# Patient Record
Sex: FEMALE | Race: WHITE | NOT HISPANIC OR LATINO
[De-identification: names, ages, dates, MRNs, and addresses within clinical notes are randomized per-mention and may not be internally consistent; named-entity substitution may affect disease eponyms.]

---

## 2022-10-04 PROBLEM — Z00.00 ENCOUNTER FOR PREVENTIVE HEALTH EXAMINATION: Status: ACTIVE | Noted: 2022-10-04

## 2022-10-06 ENCOUNTER — APPOINTMENT (OUTPATIENT)
Dept: ORTHOPEDIC SURGERY | Facility: CLINIC | Age: 67
End: 2022-10-06

## 2022-10-08 ENCOUNTER — EMERGENCY (EMERGENCY)
Facility: HOSPITAL | Age: 67
LOS: 1 days | Discharge: HOME | End: 2022-10-08
Attending: STUDENT IN AN ORGANIZED HEALTH CARE EDUCATION/TRAINING PROGRAM | Admitting: STUDENT IN AN ORGANIZED HEALTH CARE EDUCATION/TRAINING PROGRAM
Payer: COMMERCIAL

## 2022-10-08 VITALS
RESPIRATION RATE: 18 BRPM | SYSTOLIC BLOOD PRESSURE: 116 MMHG | OXYGEN SATURATION: 98 % | TEMPERATURE: 98 F | DIASTOLIC BLOOD PRESSURE: 80 MMHG | HEART RATE: 80 BPM

## 2022-10-08 VITALS
RESPIRATION RATE: 18 BRPM | OXYGEN SATURATION: 98 % | WEIGHT: 74.96 LBS | TEMPERATURE: 97 F | DIASTOLIC BLOOD PRESSURE: 81 MMHG | HEART RATE: 85 BPM | HEIGHT: 58 IN | SYSTOLIC BLOOD PRESSURE: 118 MMHG

## 2022-10-08 PROCEDURE — 72074 X-RAY EXAM THORAC SPINE4/>VW: CPT

## 2022-10-08 PROCEDURE — 99284 EMERGENCY DEPT VISIT MOD MDM: CPT | Mod: 25

## 2022-10-08 PROCEDURE — 72074 X-RAY EXAM THORAC SPINE4/>VW: CPT | Mod: 26

## 2022-10-08 PROCEDURE — 99284 EMERGENCY DEPT VISIT MOD MDM: CPT

## 2022-10-08 PROCEDURE — 72040 X-RAY EXAM NECK SPINE 2-3 VW: CPT | Mod: 26

## 2022-10-08 PROCEDURE — 99283 EMERGENCY DEPT VISIT LOW MDM: CPT

## 2022-10-08 PROCEDURE — 72110 X-RAY EXAM L-2 SPINE 4/>VWS: CPT | Mod: 26

## 2022-10-08 PROCEDURE — 72110 X-RAY EXAM L-2 SPINE 4/>VWS: CPT

## 2022-10-08 PROCEDURE — 72040 X-RAY EXAM NECK SPINE 2-3 VW: CPT

## 2022-10-08 RX ORDER — LIDOCAINE 4 G/100G
1 CREAM TOPICAL ONCE
Refills: 0 | Status: COMPLETED | OUTPATIENT
Start: 2022-10-08 | End: 2022-10-08

## 2022-10-08 RX ORDER — ACETAMINOPHEN 500 MG
650 TABLET ORAL ONCE
Refills: 0 | Status: COMPLETED | OUTPATIENT
Start: 2022-10-08 | End: 2022-10-08

## 2022-10-08 RX ADMIN — Medication 325 MILLIGRAM(S): at 12:12

## 2022-10-08 NOTE — ED PROVIDER NOTE - NSFOLLOWUPINSTRUCTIONS_ED_ALL_ED_FT
Back Pain    WHAT YOU NEED TO KNOW:    Back pain is common. You may have back pain and muscle spasms. You may feel sore or stiff on one or both sides of your back. The pain may spread to your lower body. Conditions that affect the spine, joints, or muscles can cause back pain. These may include arthritis, spinal stenosis (narrowing of the spinal column), muscle tension, or breakdown of the spinal discs.    DISCHARGE INSTRUCTIONS:    Call your local emergency number (911 in the ) if:   •You have severe back pain with chest pain.  •You cannot control your urine or bowel movements.  •Your pain becomes so severe that you cannot walk.    Return to the emergency department if:   •You have pain, numbness, or weakness in one or both legs.  •You have severe back pain, nausea, and vomiting.  •You have severe back pain that spreads to your side or genital area.    Call your doctor if:   •You have back pain that does not get better with rest and pain medicine.  •You have a fever.  •You have pain that worsens when you are on your back or when you rest.  •You have pain that worsens when you cough or sneeze.  •You lose weight without trying.  •You have questions or concerns about your condition or care.    Medicines: You may need any of the following:   •NSAIDs help decrease swelling and pain or fever. This medicine is available with or without a doctor's order. NSAIDs can cause stomach bleeding or kidney problems in certain people. If you take blood thinner medicine, always ask your healthcare provider if NSAIDs are safe for you. Always read the medicine label and follow directions.    •Acetaminophen decreases pain and fever. It is available without a doctor's order. Ask how much to take and how often to take it. Follow directions. Read the labels of all other medicines you are using to see if they also contain acetaminophen, or ask your doctor or pharmacist. Acetaminophen can cause liver damage if not taken correctly.    •Muscle relaxers help decrease muscle spasms and back pain.  •Prescription pain medicine may be given. Ask your healthcare provider how to take this medicine safely. Some prescription pain medicines contain acetaminophen. Do not take other medicines that contain acetaminophen without talking to your healthcare provider. Too much acetaminophen may cause liver damage. Prescription pain medicine may cause constipation. Ask your healthcare provider how to prevent or treat constipation.   •Take your medicine as directed. Contact your healthcare provider if you think your medicine is not helping or if you have side effects. Tell him or her if you are allergic to any medicine. Keep a list of the medicines, vitamins, and herbs you take. Include the amounts, and when and why you take them. Bring the list or the pill bottles to follow-up visits. Carry your medicine list with you in case of an emergency.    How to manage your back pain:   •Apply ice on your back for 15 to 20 minutes every hour or as directed. Use an ice pack, or put crushed ice in a plastic bag. Cover it with a towel before you apply it to your skin. Ice helps prevent tissue damage and decreases pain.  •Apply heat on your back for 20 to 30 minutes every 2 hours for as many days as directed. Heat helps decrease pain and muscle spasms.  •Stay active as much as you can without causing more pain. Bed rest could make your back pain worse. Avoid heavy lifting until your pain is gone.    •Go to physical therapy as directed. A physical therapist can teach you exercises to help improve movement and strength, and to decrease pain.    Follow up with your doctor in 2 weeks, or as directed: You might need to see a specialist. Write down your questions so you remember to ask them during your visits.    © Copyright [employer] 2022

## 2022-10-08 NOTE — ED ADULT NURSE NOTE - NSIMPLEMENTINTERV_GEN_ALL_ED
Implemented All Fall Risk Interventions:  [place] to call system. Call bell, personal items and telephone within reach. Instruct patient to call for assistance. Room bathroom lighting operational. Non-slip footwear when patient is off stretcher. Physically safe environment: no spills, clutter or unnecessary equipment. Stretcher in lowest position, wheels locked, appropriate side rails in place. Provide visual cue, wrist band, yellow gown, etc. Monitor gait and stability. Monitor for mental status changes and reorient to person, place, and time. Review medications for side effects contributing to fall risk. Reinforce activity limits and safety measures with patient and family.

## 2022-10-08 NOTE — ED PROVIDER NOTE - CLINICAL SUMMARY MEDICAL DECISION MAKING FREE TEXT BOX
Patient with severe kyphosis on exam.  She has left scapular tenderness to palpation.  Concern for possible spontaneous scapular fracture versus new compression fracture as she states she has had this before.  She does not endorse any chest pain or shortness of breath and her pain is positional and worse with movement, do not suspect underlying cardiac etiology to her symptoms at this time.  She has no evidence of neuro compromise at this time with no numbness or tingling in her legs, no bowel or bladder incontinence.  We will plan for imaging, pain control and reassess.

## 2022-10-08 NOTE — ED PROVIDER NOTE - CARE PROVIDER_API CALL
Rick Mercado)  Orthopaedic Surgery  [address] [place] [address]  Phone: [phone]  Fax: [phone]  Follow Up Time: 1-3 Days

## 2022-10-08 NOTE — ED PROVIDER NOTE - PATIENT PORTAL LINK FT
You can access the FollowMyHealth Patient Portal offered by [facility] by registering at the following website: [email or web address]. By joining [employer]’s FollowMyHealth portal, you will also be able to view your health information using other applications (apps) compatible with our system.

## 2022-10-08 NOTE — ED ADULT NURSE NOTE - OBJECTIVE STATEMENT
Pt states she has been having left scapular pain for the past two weeks. Pt states she has a history of severe osteoarthritis and thinks she might have a fracture. Pt denies trauma. Pt appears uncomfortable. Pt updated on plan of care.

## 2022-10-08 NOTE — ED PROVIDER NOTE - OBJECTIVE STATEMENT
Patient with a past medical history of irritable bowel syndrome and osteoporosis with severe kyphosis is presenting with concern for back and left scapular pain.  States her pain started about 2 weeks ago after she was lifting objects.  Pain has been constant.  States she has had compression fractures in the past from her osteoporosis and is concerned that this occurred again.  Pain is worse with movement.  Worse when she tries to lie down.  No associated chest pain or shortness of breath.  No nausea or vomiting.  Has not tried medication for her symptoms.

## 2022-10-08 NOTE — ED PROVIDER NOTE - PHYSICAL EXAMINATION
Constitutional: Awake, Alert, non-toxic. No acute distress. Well appearing, well nourished.   HEAD: Normocephalic, atraumatic.   EYES: PERRL, EOM intact, conjunctiva and sclera are clear bilaterally.  ENT: External ears normal. No rhinorrhea, no tracheal deviation   NECK: Supple, non-tender  CARDIOVASCULAR: regular rate and rhythm.  RESPIRATORY: Normal respiratory effort; breath sounds CTAB, no wheezes, rhonchi, or rales. Speaking in full sentences. No accessory muscle use.   ABDOMEN: Soft; non-tender, non-distended. No rebound or guarding.   EXTREMITIES:  no lower extremity edema, severe kyphosis on exam, no midline ttp. has left lateral scapular ttp. able to range her arms and stand up, however not able to bring her arms above her head  SKIN: Warm, dry  NEURO: A&O x3. Sensory and motor functions are grossly intact. Speech is normal. No facial droop.  PSYCH: Appearance and judgement seem appropriate for gender and age.

## 2022-10-08 NOTE — ED PROVIDER NOTE - PROGRESS NOTE DETAILS
Discussed with patient in regards to obtaining imaging study.  Patient states that she does not feel comfortable lying flat to get a CT scan.  She is asking if we could do x-rays.  I discussed with her that given her severe kyphosis that obtaining x-rays of her cervical, thoracic and lumbar spine would likely be nondiagnostic in the setting.  She is hopeful to do these x-rays and likely be discharged with orthopedic follow-up.  I offered multiple ways including giving her medication to help with facilitating CT scan but she still is worried at this time.  Given that there is no trauma, and she has no neurodeficits, I do have an overall lower suspicion for any acute fracture at this time, so I would be comfortable with obtaining x-rays and then discharging with orthopedic follow-up. Berry Gomez MD. Patient reassessed at this time.  I informed her of x-ray and her nondiagnostic but did not show any evidence of any acute abnormality.  I did discuss that if any final reads come back showing underlying fracture or abnormality, that she will be contacted regarding her results.  We will plan to have patient discharged at this time with orthopedic follow-up. she states she will follow up with dr. alicia to help facilitate outpatient upright scans.  Patient denies having any arm numbness or tingling or weakness.  I do not suspect any cervical spine injury at this time.  In regards to earlier concern for scapular fracture, she has equal strength in her upper extremities.  Lower suspicion at this time given no reported trauma. Berry Gomez MD.

## 2023-04-03 ENCOUNTER — APPOINTMENT (OUTPATIENT)
Dept: ORTHOPEDIC SURGERY | Facility: CLINIC | Age: 68
End: 2023-04-03
Payer: COMMERCIAL

## 2023-04-03 VITALS — WEIGHT: 75 LBS | BODY MASS INDEX: 15.74 KG/M2 | HEIGHT: 58 IN

## 2023-04-03 DIAGNOSIS — S32.050A WEDGE COMPRESSION FRACTURE OF FIFTH LUMBAR VERTEBRA, INITIAL ENCOUNTER FOR CLOSED FRACTURE: ICD-10-CM

## 2023-04-03 DIAGNOSIS — S32.040A WEDGE COMPRESSION FRACTURE OF FOURTH LUMBAR VERTEBRA, INITIAL ENCOUNTER FOR CLOSED FRACTURE: ICD-10-CM

## 2023-04-03 DIAGNOSIS — Z87.39 PERSONAL HISTORY OF OTHER DISEASES OF THE MUSCULOSKELETAL SYSTEM AND CONNECTIVE TISSUE: ICD-10-CM

## 2023-04-03 DIAGNOSIS — M47.22 OTHER SPONDYLOSIS WITH RADICULOPATHY, CERVICAL REGION: ICD-10-CM

## 2023-04-03 DIAGNOSIS — M54.2 CERVICALGIA: ICD-10-CM

## 2023-04-03 DIAGNOSIS — M81.0 AGE-RELATED OSTEOPOROSIS W/OUT CURRENT PATHOLOGICAL FRACTURE: ICD-10-CM

## 2023-04-03 DIAGNOSIS — S13.9XXA SPRAIN OF JOINTS AND LIGAMENTS OF UNSPECIFIED PARTS OF NECK, INITIAL ENCOUNTER: ICD-10-CM

## 2023-04-03 PROCEDURE — 99203 OFFICE O/P NEW LOW 30 MIN: CPT

## 2023-04-03 NOTE — PHYSICAL EXAM
[] : light touch intact throughout both upper extremities [No bony abnormalities] : No bony abnormalities [FreeTextEntry3] : large upper thoracic kyphosis leading into hyperlordotic neck

## 2023-04-03 NOTE — HISTORY OF PRESENT ILLNESS
[Neck] : neck [Gradual] : gradual [de-identified] : 4-3-23- one month  of neck pain after dental work. she saw her rheumatologist who sent her to [facility] for xrays \par xrays are reviewed show chronic compression fx of l4- and l5 \par \par she is known to ambulate with a cane  [] : no [FreeTextEntry3] : 3-4 weeks  [FreeTextEntry5] : patient feels that she started to get pain again after visiting the dentist and taking a lot of xrays for her teeth. She went to [facility] and had xrays done on 3/27/23 [de-identified] : dr. alicia  [de-identified] : xrays

## 2023-12-27 ENCOUNTER — EMERGENCY (EMERGENCY)
Facility: HOSPITAL | Age: 68
LOS: 1 days | Discharge: HOME | End: 2023-12-27
Attending: EMERGENCY MEDICINE | Admitting: EMERGENCY MEDICINE
Payer: COMMERCIAL

## 2023-12-27 VITALS
HEART RATE: 80 BPM | SYSTOLIC BLOOD PRESSURE: 115 MMHG | RESPIRATION RATE: 14 BRPM | DIASTOLIC BLOOD PRESSURE: 77 MMHG | OXYGEN SATURATION: 97 %

## 2023-12-27 VITALS
HEART RATE: 87 BPM | TEMPERATURE: 97 F | RESPIRATION RATE: 18 BRPM | OXYGEN SATURATION: 100 % | SYSTOLIC BLOOD PRESSURE: 105 MMHG | DIASTOLIC BLOOD PRESSURE: 71 MMHG

## 2023-12-27 DIAGNOSIS — F32.A DEPRESSION, UNSPECIFIED: ICD-10-CM

## 2023-12-27 PROCEDURE — 72082 X-RAY EXAM ENTIRE SPI 2/3 VW: CPT | Mod: 26

## 2023-12-27 PROCEDURE — 99284 EMERGENCY DEPT VISIT MOD MDM: CPT

## 2023-12-27 RX ORDER — OXYCODONE HYDROCHLORIDE 5 MG/1
10 TABLET ORAL ONCE
Refills: 0 | Status: DISCONTINUED | OUTPATIENT
Start: 2023-12-27 | End: 2023-12-27

## 2023-12-27 RX ORDER — IBUPROFEN 200 MG
600 TABLET ORAL ONCE
Refills: 0 | Status: COMPLETED | OUTPATIENT
Start: 2023-12-27 | End: 2023-12-27

## 2023-12-27 RX ORDER — ACETAMINOPHEN 500 MG
975 TABLET ORAL ONCE
Refills: 0 | Status: DISCONTINUED | OUTPATIENT
Start: 2023-12-27 | End: 2023-12-31

## 2023-12-27 RX ORDER — LIDOCAINE 4 G/100G
1 CREAM TOPICAL ONCE
Refills: 0 | Status: COMPLETED | OUTPATIENT
Start: 2023-12-27 | End: 2023-12-27

## 2023-12-27 NOTE — BH SAFETY PLAN - DISTRACTION NAME 1
[facility], [facility]

## 2023-12-27 NOTE — ED BEHAVIORAL HEALTH ASSESSMENT NOTE - REFERRAL / APPOINTMENT DETAILS
continue to follow up with established [facility] health providers; patient states she has to call back to make next appointments continue to follow up with established [facility] health providers; patient states she has to call back to make next appointments

## 2023-12-27 NOTE — ED PROVIDER NOTE - NSFOLLOWUPINSTRUCTIONS_ED_ALL_ED_FT
You were seen in the Emergency Department for pain.     1) Advance activity as tolerated.   2) Continue all previously prescribed medications as directed.    3) Follow up with your primary care physician in 24-48 hours - take copies of your results.    4) Return to the Emergency Department for worsening or persistent symptoms, and/or ANY NEW OR CONCERNING SYMPTOMS.    you can take tylenol/acetaminophen 975mg every 6 hours for pain (do not exceed 1000mg per dose or 4000mg per day). be sure that any other medications you are taking do not contain tylenol/acetaminophen. if other medications do include tylenol/acetaminophen, be sure to include this in your calculations of one time dose and daily dose.     you can also take motrin/ibuprofen 600mg for pain.     acetaminophen/tylenol can be taken at the same time as motrin/ibuprofen for maximum pain relief or 3 hours apart for continuous pain relief.    you can use lidocaine patches over the counter. these can be worn for 12 hours a day, once a day. do not place over infected or open skin.    heating pads can help with associated muscle spasms    someone from the hospital will call you for an appointment with a pain management specialist.

## 2023-12-27 NOTE — BH SAFETY PLAN - LOCAL URGENT CARE ADDRESS
[address] [place] [address], [id number] [address] [place] [address], [phone]

## 2023-12-27 NOTE — ED BEHAVIORAL HEALTH NOTE - BEHAVIORAL HEALTH NOTE
Search Terms: estela kc, 1955Search Date: 12/27/2023 22:54:11 PM  Searching on behalf of: Myself  The Drug Utilization Report below displays all of the controlled substance prescriptions, if any, that your patient has filled in the last twelve months. The information displayed on this report is compiled from pharmacy submissions to the Department, and accurately reflects the information as submitted by the pharmacies.    This report was requested by: Ashly Delaney | Reference #: [id number]    Practitioner Count: 2  Pharmacy Count: 1  Current Opioid Prescriptions: 1  Current Benzodiazepine Prescriptions: 1  Current Stimulant Prescriptions: 0      Patient Demographic Information (PDI)       PDI	First Name	Last Name	Birth Date	Gender	Street Address	[facility]	Zip Code  A	Estela Kc	1955	Female	[address]	[facility]	[address]    Prescription Information      PDI Filter:    PDI	My Rx	Current Rx	Drug Type	Rx Written	Rx Dispensed	Drug	Quantity	Days Supply	Prescriber Name	Prescriber NYA #	Payment Method	Dispenser  A	N	Y	B	12/18/2023	12/21/2023	alprazolam 0.25 mg tablet	60	30	Venkat, Kelly	[id number]	Insurance	[facility] #[id number]  A	N	Y	O	12/04/2023	12/16/2023	oxycodone-acetaminophen 5-325 mg tablet	60	30	Venkat, Kelly	[id number]	[facility] #[id number]  A	N	N	O	11/26/2023	11/26/2023	oxycodone-acetaminophen 5-325 mg tablet	7	7	Linnette Peace	[id number]	[facility] #[id number]  A	N	N	O	11/14/2023	11/18/2023	tramadol hcl 50 mg tablet	90	30	Venkat, Kelly	[id number]	[facility] #[id number]  A	N	N	B	11/14/2023	11/18/2023	alprazolam 0.25 mg tablet	60	30	Venkat, Kelly	[id number]	[facility] #[id number]    * - Details of Drug Type : O = Opioid, B = Benzodiazepine, S = Stimulant Search Terms: estela kc, 1955Search Date: 12/27/2023 22:54:11 PM  Searching on behalf of: Myself  The Drug Utilization Report below displays all of the controlled substance prescriptions, if any, that your patient has filled in the last twelve months. The information displayed on this report is compiled from pharmacy submissions to the Department, and accurately reflects the information as submitted by the pharmacies.    This report was requested by: Ashly Delaney | Reference #: [id number]    Practitioner Count: 2  Pharmacy Count: 1  Current Opioid Prescriptions: 1  Current Benzodiazepine Prescriptions: 1  Current Stimulant Prescriptions: 0      Patient Demographic Information (PDI)       PDI	First Name	Last Name	Birth Date	Gender	Street Address	[facility]	Zip Code  A	Estela Kc	1955	Female	[address]	[facility]	[id number]    Prescription Information      PDI Filter:    PDI	My Rx	Current Rx	Drug Type	Rx Written	Rx Dispensed	Drug	Quantity	Days Supply	Prescriber Name	Prescriber NYA #	Payment Method	Dispenser  A	N	Y	B	12/18/2023	12/21/2023	alprazolam 0.25 mg tablet	60	30	Venkat, Kelly	[id number]	Insurance	[facility] #[id number]  A	N	Y	O	12/04/2023	12/16/2023	oxycodone-acetaminophen 5-325 mg tablet	60	30	Venkat, Kelly	[id number]	[facility] #[id number]  A	N	N	O	11/26/2023	11/26/2023	oxycodone-acetaminophen 5-325 mg tablet	7	7	Linnette Peace	[id number]	[facility] #[id number]  A	N	N	O	11/14/2023	11/18/2023	tramadol hcl 50 mg tablet	90	30	Venkat, Kelly	[id number]	[facility] #[id number]  A	N	N	B	11/14/2023	11/18/2023	alprazolam 0.25 mg tablet	60	30	Venkat, Kelly	[id number]	[facility] #[id number]    * - Details of Drug Type : O = Opioid, B = Benzodiazepine, S = Stimulant

## 2023-12-27 NOTE — ED BEHAVIORAL HEALTH ASSESSMENT NOTE - SUMMARY
Patient is a 68 y o female,  to  x over 40 years, domiciled, non caregiver, retired professional writer, past medical history of irritable bowel syndrome and osteoporosis with hx of compression fractures, with past psychiatric history of depression and anxiety, without past inpatient psychiatric admissions, without past suicide or self injurious behavior, currently in outpatient treatment with psychiatrist Gwen Chairez and therapist of 11 years Audrey Luque (reports good rapport with her), not currently on medications for depression, receiving Xanax from a pain mgmt NP for pain, presented to the ED for worsening pain.  Psychiatry consult placed after patient having endorsed suicide attempt by taking 2 xanax, 1 percocet, 1 muscle relaxant and one glass of wine.    On evaluation, patient reports sad mood in context of ongoing chronic pain, but at this time does not present with acute depressive episode.  She does not present with sx or signs of acute janey, psychosis, impairment in functioning 2/2 psychiatric sx and is w/o SI/HI.  She denies that recent medication overdose was with intention to end her life bur rather to alleviate pain and to obtain better sleep and was not in lethal quantities.  She reports occasional passive death wishes due to ongoing pain but denies same on current evaluation and denies acute suicidal/ self harming ideation, intent or plan at time of evaluation or in the past.  Given them, together w/ no endorsement of imminent safety concerns from collateral, patient does not meet criteria for involuntary inpatient psychiatric admission at this time and does not wish for voluntary admission.  Patient and  agreeable to make environment safe by putting pulls out of reach of patient to avoid further accident medication errors / overtaking for sx control. Patient and her  advocating for increased services at home to manage pain and medication Patient is a 68 y o female,  to  x over 40 years, domiciled, non caregiver, retired professional writer, past medical history of irritable bowel syndrome and osteoporosis with hx of compression fractures, with past psychiatric history of depression and anxiety, without past inpatient psychiatric admissions, without past suicide or self injurious behavior, currently in outpatient treatment with psychiatrist Gwen Chairez and therapist of 11 years Audrey Luque (reports good rapport with her), not currently on medications for depression, receiving Xanax from a pain mgmt NP for pain, presented to the ED for worsening pain.  Psychiatry consult placed after patient having endorsed suicide attempt by taking 2 xanax, 1 percocet, 1 muscle relaxant and one glass of wine.    On evaluation, patient reports sad mood in context of ongoing chronic pain, but at this time does not present with acute depressive episode.  She does not present with sx or signs of acute janey, psychosis, impairment in functioning 2/2 psychiatric sx and is w/o SI/HI.  She denies that recent medication overdose was with intention to end her life bur rather to alleviate pain and to obtain better sleep and was not in lethal quantities.  She reports occasional passive death wishes due to ongoing pain but denies same on current evaluation and denies acute suicidal/ self harming ideation, intent or plan at time of evaluation or in the past.  Given above, together w/ no endorsement of imminent safety concerns from collateral, and risk assessment below, patient does not meet criteria for involuntary inpatient psychiatric admission at this time and does not wish for voluntary admission.  Patient and  agreeable to make environment safe by putting pills out of reach of patient to avoid further accidental medication errors / overtaking for sx control. Patient and her  advocating for increased services at home to manage pain and medication.     Recommendations:  -treat and release and continue to follow up w/ established mental  health providers; no inidication for acute psychiatric intervention at this time   -recommend social work consult to assess for eligibility for increasing home service  -pain management and palliative care consult

## 2023-12-27 NOTE — ED PROVIDER NOTE - PROGRESS NOTE DETAILS
Clothing Angélica (Cyn Watkins PGY3 pt refused medications in the ED. after education amenable to taking medications at home aside from opiates due to reactions in the past. palliative called for long term comfort care, left a message. SW evaluated and gave resources . cleared by psych

## 2023-12-27 NOTE — ED BEHAVIORAL HEALTH ASSESSMENT NOTE - NSBHATTESTBILLING_PSY_A_CORE
[id number]-Biartblqcxg diagnostic evaluation with medical services [id number]-Sgayikcknxt diagnostic evaluation with medical services

## 2023-12-27 NOTE — ED ADULT TRIAGE NOTE - CHIEF COMPLAINT QUOTE
Patient c/o severe chronic back pain, reports she attempted suicide last week and 2 nights ago. Patient reports 2 nights ago she took 2 xanax, 1 percocet, 1 muscle relaxant and one glass of wine to try to kill herself. Denies nausea, vomiting, diarrhea, chest pain, SOB. Phx osteoporosis, wheelchair-bound, depression, anxiety. Oral temp is not reading in triage. MD Hu to see in triage

## 2023-12-27 NOTE — ED ADULT NURSE NOTE - OBJECTIVE STATEMENT
Pt received to Rm 3, A&Ox4. Pt endorsing chronic severe back pain, states she cannot lay in the bed because it is too uncomfortable on her back. Pt admits to attempted suicide 2 nights ago. Pt states she wanted to numb the pain, reports taking 2 xanax, 1 percocet, 1 muscle relaxant with a glass of wine to kill herself. Pt hx of osteoporosis, wheelchair bound at baseline, depression. Pt belongings collected, placed in closet across from CT scan. PCA at bedside for 1:1 constant observation. Pt sitting in chair next to stretcher for comfort. Pt refusing pain medications at this time, states they do not work for her and that she "wants to go home". Pt temperature taken rectally, 97.1. VS noted in flowsheet. No injury or trauma noted. Pt denies HI, auditory/visual hallucinations, headache, dizziness, cheat pain, N/V/D. Resp even and unlabored. Safety maintained throughout.

## 2023-12-27 NOTE — ED PROVIDER NOTE - PHYSICAL EXAMINATION
GEN - NAD; Cachectic appearing with temporal wasting.  CARD -s1s2, RRR, no M,G,R;   PULM - CTA b/l, symmetric breath sounds;   ABD -  +BS, ND, NT, soft, no guarding, no rebound, no masses;   BACK - no CVA tenderness, diffusely tender to palpation, with thoracic kyphosis noted  EXT - symmetric pulses, 2+ dp, capillary refill < 2 seconds, no cyanosis, no edema;   NEURO - no focal neuro deficits, no slurred speech

## 2023-12-27 NOTE — ED BEHAVIORAL HEALTH ASSESSMENT NOTE - HPI (INCLUDE ILLNESS QUALITY, SEVERITY, DURATION, TIMING, CONTEXT, MODIFYING FACTORS, ASSOCIATED SIGNS AND SYMPTOMS)
Patient is a 68 y o female,  to  x over 40 years, domiciled, non caregiver, retired professional writer, past medical history of irritable bowel syndrome and osteoporosis with hx of compression fractures, with past psychiatric history of depression and anxiety, without past inpatient psychiatric admissions, without past suicide or self injurious behavior, currently in outpatient treatment with psychiatrist Gwen Chairez and therapist of 11 years Audrey Luque (reports good rapport with her), not currently on medications for depression, receiving Xanax from a pain mgmt NP for pain, presented to the ED for worsening pain.  Psychiatry consult placed after patient having endorsed suicide attempt by taking 2 xanax, 1 percocet, 1 muscle relaxant and one glass of wine.    On evaluation, patient is alert, calm and cooperative, however, visibly in pain and cold and does not wish for additional blankets to their weight.  She requests to go home to her bed to sleep multiple times throughout the interview.  She is offered pain medications by nursing staff during evaluation however she declines citing wishing to take her Xanax at home instead.     She states that 2 nights ago instead of taking usual 1 Xanax she took 2 Xanax and 1 Percocet and had a sip of wine and reports accidentally taking one Tramadol after that, as well.  Reports the intention of this was to relieve pain and go to sleep rather than to end her life.  She adamantly denies suicidal intent with excessive medication ingestion.  She reports there are times that she has thoughts of "wish that God would take me" but denies active suicidal ideation, intent, plan or attempt.  She is asked how this was misconstrued as a suicide attempt in triage note and she stated she is not sure but again reiterates that taking excess medication was not a suicide attempt.  Denies other overdose incidents.  Reports that she would never take her own life due to loving her  and not wanting to put him through turmoil; Taoist; her salvation and that when she does she wants to go to heaven and wants to be able to communicate with her  when she dies and she knows she wouldn't be able to do that if she took her own life; her cat Buck; her friends, whom she cites as support system and whom she reaches out to frequently.  Reports  as a great source of support for her.  Reports overall she has been with sad mood due to ongoing pain and poor pain control and poor sleep due to ongoing pain.  Denies guilt, worthlessness, hopelessness, concentration changes, psychomotor retardation, suicidal ideation, intent or plan (including at time of evaluation). Denies passive death wishes at time of evaluation, as well.  Reports occasional panic attacks which she describes as intense anxiety and rapid breathing. Reports praying and her rosary beads as helpful with these sx.  Denies sx of acute janey, psychosis, PTSD.  She reports that she has tried Cymbalta and Remeron for depression but neither worked and at this time she does not wish to try other medications. Does not wish to voluntary inpatient psychiatric admission and wishes to go home to her bed, blanket and cat.  She reports that if she were to develop suicidal thoughts she would call suicide hotline and her prayer hotline.      Collateral obtained from patient's .  See  note.  Writer spoke with , as well.  Reports patient is struggling with significant pain and getting medication regimen correct and knowing how to manage medication at home to alleviate pain, which he has been helping her but feels that he / they need more support.  Reports that 2 nights ago patient screamed out to him "I made a mistake" when she took a medication by accident earlier than she was meant to (reports waiting 5 hours when she was supposed to wait longer).  Reports that he has no suicidal / imminent safety concerns from suicidal standpoint w/ regards to that incident or now but feels that her pain level and medication for same needs to be addressed.  Denies that she has endorsed SI to him recently or in the past, denies past SA attempts.  Reports that out of caution that she does not make medication error again he will keep medications locked away in the basement, which she cannot access due to being unable to descend stairs.  Reports that sharps are also out of reach for patient and denies firearm access in the house.  Reports he works from home and he is at home with patient most of the time unless he steps out for groceries. Feels safe taking her home but is requesting more service in the home. Reports she is not w/ impairment in functioning outside of that related to her pain or changes in ADL function from baseline. Patient is a 68 y o female,  to  x over 40 years, domiciled, non caregiver, retired professional writer, past medical history of irritable bowel syndrome and osteoporosis with hx of compression fractures, with past psychiatric history of depression and anxiety, without past inpatient psychiatric admissions, without past suicide or self injurious behavior, currently in outpatient treatment with psychiatrist Gwen Chairez and therapist of 11 years Audrey Luqeu (reports good rapport with her), not currently on medications for depression, receiving Xanax from a pain mgmt NP for pain, presented to the ED for worsening pain.  Psychiatry consult placed after patient having endorsed suicide attempt by taking 2 xanax, 1 percocet, 1 muscle relaxant and one glass of wine.    On evaluation, patient is alert, calm and cooperative, however, visibly in pain and cold and does not wish for additional blankets to their weight.  She requests to go home to her bed to sleep multiple times throughout the interview.  She is offered pain medications by nursing staff during evaluation however she declines citing wishing to take her Xanax at home instead.     She states that 2 nights ago instead of taking usual 1 Xanax she took 2 Xanax and 1 Percocet and had a sip of wine and reports accidentally taking one Tramadol after that, as well.  Reports the intention of this was to relieve pain and go to sleep rather than to end her life.  She adamantly denies suicidal intent with excessive medication ingestion.  She reports there are times that she has thoughts of "wish that God would take me" but denies active suicidal ideation, intent, plan or attempt.  She is asked how this was misconstrued as a suicide attempt in triage note and she stated she is not sure but again reiterates that taking excess medication was not a suicide attempt.  Denies other overdose incidents.  Reports that she would never take her own life due to loving her  and not wanting to put him through turmoil; Sikh; her salvation and that when she does she wants to go to heaven and wants to be able to communicate with her  when she dies and she knows she wouldn't be able to do that if she took her own life; her cat Buck; her friends, whom she cites as support system and whom she reaches out to frequently.  Reports  as a great source of support for her.  Reports overall she has been with sad mood due to ongoing pain and poor pain control and poor sleep due to ongoing pain.  Denies guilt, worthlessness, hopelessness, concentration changes, psychomotor retardation, suicidal ideation, intent or plan (including at time of evaluation). Denies passive death wishes at time of evaluation, as well.  Reports occasional panic attacks which she describes as intense anxiety and rapid breathing. Reports praying and her rosary beads as helpful with these sx.  Denies sx of acute janey, psychosis, PTSD.  She reports that she has tried Cymbalta and Remeron for depression but neither worked and at this time she does not wish to try other medications. Does not wish to voluntary inpatient psychiatric admission and wishes to go home to her bed, blanket and cat.  She reports that if she were to develop suicidal thoughts she would call suicide hotline and her prayer hotline.      Collateral obtained from patient's .  See  note.  Writer spoke with , as well.  Reports patient is struggling with significant pain and getting medication regimen correct and knowing how to manage medication at home to alleviate pain, which he has been helping her but feels that he / they need more support.  Reports that 2 nights ago patient screamed out to him "I made a mistake" when she took a medication by accident earlier than she was meant to (reports waiting 5 hours when she was supposed to wait longer).  Reports that he has no suicidal / imminent safety concerns from suicidal standpoint w/ regards to that incident or now but feels that her pain level and medication for same needs to be addressed.  Denies that she has endorsed SI to him recently or in the past, denies past SA attempts.  Reports that out of caution that she does not make medication error again he will keep medications locked away in the basement, which she cannot access due to being unable to descend stairs.  Reports that sharps are also out of reach for patient and denies firearm access in the house.  Reports he works from home and he is at home with patient most of the time unless he steps out for groceries. Feels safe taking her home but is requesting more service in the home. Reports she is not w/ impairment in functioning outside of that related to her pain or changes in ADL function from baseline.

## 2023-12-27 NOTE — ED BEHAVIORAL HEALTH ASSESSMENT NOTE - NS ED BHA PLAN TR BH CONTACTED FT
writer left  with patient's psychiatrist Dr Gwen Chairez at [phone] writer left  with patient's psychiatrist Dr Gwen Chairez at [phone]

## 2023-12-27 NOTE — ED PROVIDER NOTE - OBJECTIVE STATEMENT
69 yo F with irritable bowel syndrome, osteoporosis with severe kyphosis a/w suicide attempt 2 days ago.  Pt reports chronic back pain unrelieved with current pain management and reports being on percocets and xanax for pain.  Pt reports taking 2 xanax, 1 percocet, 1 muscle relaxant and one glass of wine to try to kill herself.  Pt reports she only wanted to end the pain.  Pt reports she has been wheel chair bound mainly 2/2 chronic pain and is only able to ambulate a few steps.      Pt reports she was referred to hospice however was not accepted 2/2 she did not have a terminal illness.

## 2023-12-27 NOTE — BH SAFETY PLAN - SUICIDE PREVENTION LIFELINE PHONES
Suicide Prevention Lifeline Phone: [phone]- TALK ([phone]) Suicide Prevention Lifeline Phone: [phone]- TALK ([phone])

## 2023-12-27 NOTE — ED PROVIDER NOTE - PATIENT PORTAL LINK FT
You can access the FollowMyHealth Patient Portal offered by [facility] by registering at the following website: [email or web address]. By joining [employer]’s FollowMyHealth portal, you will also be able to view your health information using other applications (apps) compatible with our system. You can access the FollowMyHealth Patient Portal offered by [facility] by registering at the following website: [email or web address]. By joining [employer]’s FollowMyHealth portal, you will also be able to view your health information using other applications (apps) compatible with our system.

## 2023-12-27 NOTE — ED ADULT NURSE NOTE - NSFALLRISKINTERV_ED_ALL_ED
Assistance OOB with selected safe patient handling equipment if applicable/Assistance with ambulation/Communicate fall risk and risk factors to all staff, patient, and family/Monitor gait and stability/Provide patient with walking aids/Provide visual cue: yellow wristband, yellow gown, etc/Reinforce activity limits and safety measures with patient and family/Call bell, personal items and telephone in reach/Instruct patient to call for assistance before getting out of bed/chair/stretcher/Non-slip footwear applied when patient is off stretcher/[place] to call system/Physically safe environment - no spills, clutter or unnecessary equipment/Purposeful Proactive Rounding/Room/bathroom lighting operational, light cord in reach Assistance OOB with selected safe patient handling equipment if applicable/Assistance with ambulation/Communicate fall risk and risk factors to all staff, patient, and family/Monitor gait and stability/Provide patient with walking aids/Provide visual cue: yellow wristband, yellow gown, etc/Reinforce activity limits and safety measures with patient and family/Call bell, personal items and telephone in reach/Instruct patient to call for assistance before getting out of bed/chair/stretcher/Non-slip footwear applied when patient is off stretcher/[place] to call system/Physically safe environment - no spills, clutter or unnecessary equipment/Purposeful Proactive Rounding/Room/bathroom lighting operational, light cord in reach

## 2023-12-27 NOTE — BH SAFETY PLAN - PHONE
[phone] / [phone] / [phone] / [phone] / [phone]

## 2023-12-27 NOTE — ED BEHAVIORAL HEALTH ASSESSMENT NOTE - RISK ASSESSMENT
Patient has chronic elevated risk for self harm given age, h/o psychiatric diagnosis, and chronic pain  Patient also has dynamic risk factors due to active psychiatric sx, poor sleep patterns, unemployment, and pain  Risk factors however are currently mitigated by protective factors of race, gender, marital status, no current suicidal ideation, no history of suicide attempts, intact reality testing, no substance misuse, future orientation, ability to state reasons for living, beloved pet, Islam, judgement/ insight/impulse control intact, access to care, intact safety plan, committed to safety plan, denies access to weapons, spiritual objection to suicide, connected to care , strong support system  Given these mitigating factors, the patient does not appear to be at imminent risk for harm to self or others at this time and does not meet criteria for involuntary / emergency inpatient psychiatric hospitalization and does not wish to pursue voluntary admission at this time. Patient has chronic elevated risk for self harm given age, h/o psychiatric diagnosis, and chronic pain  Patient also has dynamic risk factors due to active psychiatric sx, poor sleep patterns, unemployment, and pain  Risk factors however are currently mitigated by protective factors of race, gender, marital status, no current suicidal ideation, no history of suicide attempts, intact reality testing, no substance misuse, future orientation, ability to state reasons for living, beloved pet, Jew, judgement/ insight/impulse control intact, access to care, intact safety plan, committed to safety plan, denies access to weapons, spiritual objection to suicide, connected to care , strong support system  Given these mitigating factors, the patient does not appear to be at imminent risk for harm to self or others at this time and does not meet criteria for involuntary / emergency inpatient psychiatric hospitalization and does not wish to pursue voluntary admission at this time.

## 2023-12-27 NOTE — ED BEHAVIORAL HEALTH ASSESSMENT NOTE - DESCRIPTION
past medical history of irritable bowel syndrome and osteoporosis with hx of compression fractures alert, cooperative, not agitated or aggressive  to  x over 40 years, domiciled, non caregiver, retired professional writer,

## 2023-12-27 NOTE — ED BEHAVIORAL HEALTH ASSESSMENT NOTE - DETAILS
patient reports occasional thoughts of wishing God would take her ; denies active SI or past SA reports hx of schizophrenia in father writer left  with patient's psychiatrist Dr Gwen Chairez at [phone] writer left  with patient's psychiatrist Dr Gwen Chairez at [phone] completed

## 2023-12-27 NOTE — ED BEHAVIORAL HEALTH NOTE - BEHAVIORAL HEALTH NOTE
Writer met with pt’s  Roberto kc ([phone]) to obtain collateral information. The following information is per the .    Pt is a 69 yo female domiciled w/ , no prior psych hx as per the , bib .    Reason for ED visit: he reports pt spoke to provider at [facility] who recommended pt to come to the ED.  says pt had reported increased pain which she is unable to manage and needs help with.    Symptoms/Hx:  says pt denied any si or hi. When writer inquired about any past suicide attempts he says no not really but did not provide details. He says the pt does not endorse any AVh, paranoia or delusions. He says pt will make passive statement saying “ why am I still here” when the pain is too much.  says pt has back pain due to spine fractures in 2007 and osteoporosis. He says pt has struggled with the pain for years and it has gotten worse. He says the pain is the driving factor to her feeling depressed. He says pt sleep is poor and suffers from insomnia. He says hygiene and appetite is okay. He says that he is the primary caretaker for the pt and assists with ADLS.     He says the nurse who visits her monthly referred them to [facility] last month for hospice or palliative care but says they were denied.    Baseline: cheerful and loving.    Medical problems: Osteoporosis, spinal fractures in 2007 and IBS.     Medication. Xanax. he did not report any other medication.    Treatment team: He says pt has been going to [facility] for the past year. He does not have contact information or know which location it is. He says pt has a nurse for pain management who comes monthly from [facility].     Family hx; none reported.    Violence/aggression: he says pt is not violent or aggressive. pt has no access to firearms or weapons.     Dispo: He is hopeful for them to be connected to a pain management nurse who can come to the house daily to manage the pain. He does not believe she is a danger to herself or others. Writer met with pt’s  Roberto kc ([phone]) to obtain collateral information. The following information is per the .    Pt is a 69 yo female domiciled w/ , no prior psych hx as per the , bib .    Reason for ED visit: he reports pt spoke to provider at [facility] who recommended pt to come to the ED.  says pt had reported increased pain which she is unable to manage and needs help with.    Symptoms/Hx:  says pt denied any si or hi. When writer inquired about any past suicide attempts he says no not really but did not provide details. He says the pt does not endorse any AVh, paranoia or delusions. He says pt will make passive statement saying “ why am I still here” when the pain is too much.  says pt has back pain due to spine fractures in 2007 and osteoporosis. He says pt has struggled with the pain for years and it has gotten worse. He says the pain is the driving factor to her feeling depressed. He says pt sleep is poor and suffers from insomnia. He says hygiene and appetite is okay. He says that he is the primary caretaker for the pt and assists with ADLS.     He says the nurse who visits her monthly referred them to [facility] last month for hospice or palliative care but says they were denied.    Baseline: cheerful and loving.    Medical problems: Osteoporosis, spinal fractures in 2007 and IBS.     Medication. Xanax. he did not report any other medication.    Treatment team: He says pt has been going to [facility] for the past year. He does not have contact information or know which location it is. He says pt has a nurse for pain management who comes monthly from [facility].     Family hx; none reported.    Violence/aggression: he says pt is not violent or aggressive. pt has no access to firearms or weapons.     Dispo: He is hopeful for them to be connected to a pain management nurse who can come to the house daily to manage the pain. He does not believe she is a danger to herself or others.

## 2023-12-27 NOTE — ED PROVIDER NOTE - CLINICAL SUMMARY MEDICAL DECISION MAKING FREE TEXT BOX
67 yo F with irritable bowel syndrome, osteoporosis with severe kyphosis a/w suicide attempt 2 days ago. Patient now does not want to hurt herself or anyone else,  at bedside wants her pain addressed.  Offered pain medications here, however patient refusing at this time.  Will obtain xray, and treat pain.  Will have psych eval given intent at the time 2 days ago, currently denying.  Will refer to pain management and possibly outpt palliative care.

## 2023-12-28 PROBLEM — K58.9 IRRITABLE BOWEL SYNDROME, UNSPECIFIED: Chronic | Status: ACTIVE | Noted: 2022-10-08

## 2023-12-28 PROBLEM — K58.9 IRRITABLE BOWEL SYNDROME WITHOUT DIARRHEA: Chronic | Status: ACTIVE | Noted: 2022-10-08

## 2023-12-28 PROBLEM — M81.0 AGE-RELATED OSTEOPOROSIS WITHOUT CURRENT PATHOLOGICAL FRACTURE: Chronic | Status: ACTIVE | Noted: 2022-10-08

## 2023-12-28 NOTE — PROVIDER CONTACT NOTE (OTHER) - ASSESSMENT
SW spoke with patient and spouse at bedside. SW provided patient with a list of home care agencies and a list for private hire agencies. SW also advised patient to speak with her primary PCP for assistance with setting up home care.

## 2024-09-29 ENCOUNTER — INPATIENT (INPATIENT)
Facility: HOSPITAL | Age: 69
LOS: 8 days | Discharge: PSYCHIATRIC OR SUBSTANCE USE FACILITY | End: 2024-10-08
Attending: HOSPITALIST | Admitting: HOSPITALIST
Payer: MEDICARE

## 2024-09-29 VITALS
HEIGHT: 58 IN | TEMPERATURE: 98 F | RESPIRATION RATE: 18 BRPM | HEART RATE: 85 BPM | SYSTOLIC BLOOD PRESSURE: 123 MMHG | DIASTOLIC BLOOD PRESSURE: 65 MMHG | OXYGEN SATURATION: 98 % | WEIGHT: 77.16 LBS

## 2024-09-29 DIAGNOSIS — T14.91XA SUICIDE ATTEMPT, INITIAL ENCOUNTER: ICD-10-CM

## 2024-09-29 DIAGNOSIS — R45.851 SUICIDAL IDEATIONS: ICD-10-CM

## 2024-09-29 DIAGNOSIS — F32.9 MAJOR DEPRESSIVE DISORDER, SINGLE EPISODE, UNSPECIFIED: ICD-10-CM

## 2024-09-29 DIAGNOSIS — K58.9 IRRITABLE BOWEL SYNDROME, UNSPECIFIED: ICD-10-CM

## 2024-09-29 DIAGNOSIS — Z29.9 ENCOUNTER FOR PROPHYLACTIC MEASURES, UNSPECIFIED: ICD-10-CM

## 2024-09-29 DIAGNOSIS — Z79.899 OTHER LONG TERM (CURRENT) DRUG THERAPY: ICD-10-CM

## 2024-09-29 LAB
ALBUMIN SERPL ELPH-MCNC: 3.7 G/DL — SIGNIFICANT CHANGE UP (ref 3.3–5)
ALP SERPL-CCNC: 58 U/L — SIGNIFICANT CHANGE UP (ref 40–120)
ALT FLD-CCNC: 10 U/L — SIGNIFICANT CHANGE UP (ref 4–33)
ANION GAP SERPL CALC-SCNC: 13 MMOL/L — SIGNIFICANT CHANGE UP (ref 7–14)
APAP SERPL-MCNC: <10 UG/ML — LOW (ref 15–25)
APPEARANCE UR: ABNORMAL
AST SERPL-CCNC: 22 U/L — SIGNIFICANT CHANGE UP (ref 4–32)
BASOPHILS # BLD AUTO: 0.02 K/UL — SIGNIFICANT CHANGE UP (ref 0–0.2)
BASOPHILS NFR BLD AUTO: 0.2 % — SIGNIFICANT CHANGE UP (ref 0–2)
BILIRUB SERPL-MCNC: <0.2 MG/DL — SIGNIFICANT CHANGE UP (ref 0.2–1.2)
BILIRUB UR-MCNC: NEGATIVE — SIGNIFICANT CHANGE UP
BUN SERPL-MCNC: 20 MG/DL — SIGNIFICANT CHANGE UP (ref 7–23)
CALCIUM SERPL-MCNC: 8.9 MG/DL — SIGNIFICANT CHANGE UP (ref 8.4–10.5)
CHLORIDE SERPL-SCNC: 93 MMOL/L — LOW (ref 98–107)
CO2 SERPL-SCNC: 27 MMOL/L — SIGNIFICANT CHANGE UP (ref 22–31)
COLOR SPEC: YELLOW — SIGNIFICANT CHANGE UP
CREAT SERPL-MCNC: 0.41 MG/DL — LOW (ref 0.5–1.3)
DIFF PNL FLD: NEGATIVE — SIGNIFICANT CHANGE UP
EGFR: 108 ML/MIN/1.73M2 — SIGNIFICANT CHANGE UP
EOSINOPHIL # BLD AUTO: 0.01 K/UL — SIGNIFICANT CHANGE UP (ref 0–0.5)
EOSINOPHIL NFR BLD AUTO: 0.1 % — SIGNIFICANT CHANGE UP (ref 0–6)
ETHANOL SERPL-MCNC: <10 MG/DL — SIGNIFICANT CHANGE UP
FLUAV AG NPH QL: SIGNIFICANT CHANGE UP
FLUBV AG NPH QL: SIGNIFICANT CHANGE UP
GLUCOSE SERPL-MCNC: 101 MG/DL — HIGH (ref 70–99)
GLUCOSE UR QL: NEGATIVE MG/DL — SIGNIFICANT CHANGE UP
HCT VFR BLD CALC: 34 % — LOW (ref 34.5–45)
HGB BLD-MCNC: 10.8 G/DL — LOW (ref 11.5–15.5)
IANC: 6.58 K/UL — SIGNIFICANT CHANGE UP (ref 1.8–7.4)
IMM GRANULOCYTES NFR BLD AUTO: 0.2 % — SIGNIFICANT CHANGE UP (ref 0–0.9)
KETONES UR-MCNC: NEGATIVE MG/DL — SIGNIFICANT CHANGE UP
LEUKOCYTE ESTERASE UR-ACNC: NEGATIVE — SIGNIFICANT CHANGE UP
LIDOCAIN IGE QN: 78 U/L — HIGH (ref 7–60)
LYMPHOCYTES # BLD AUTO: 0.96 K/UL — LOW (ref 1–3.3)
LYMPHOCYTES # BLD AUTO: 11.8 % — LOW (ref 13–44)
MAGNESIUM SERPL-MCNC: 2 MG/DL — SIGNIFICANT CHANGE UP (ref 1.6–2.6)
MCHC RBC-ENTMCNC: 25.4 PG — LOW (ref 27–34)
MCHC RBC-ENTMCNC: 31.8 GM/DL — LOW (ref 32–36)
MCV RBC AUTO: 80 FL — SIGNIFICANT CHANGE UP (ref 80–100)
MONOCYTES # BLD AUTO: 0.56 K/UL — SIGNIFICANT CHANGE UP (ref 0–0.9)
MONOCYTES NFR BLD AUTO: 6.9 % — SIGNIFICANT CHANGE UP (ref 2–14)
NEUTROPHILS # BLD AUTO: 6.58 K/UL — SIGNIFICANT CHANGE UP (ref 1.8–7.4)
NEUTROPHILS NFR BLD AUTO: 80.8 % — HIGH (ref 43–77)
NITRITE UR-MCNC: NEGATIVE — SIGNIFICANT CHANGE UP
NRBC # BLD: 0 /100 WBCS — SIGNIFICANT CHANGE UP (ref 0–0)
NRBC # FLD: 0 K/UL — SIGNIFICANT CHANGE UP (ref 0–0)
PCP SPEC-MCNC: SIGNIFICANT CHANGE UP
PH UR: 6.5 — SIGNIFICANT CHANGE UP (ref 5–8)
PHOSPHATE SERPL-MCNC: 2.8 MG/DL — SIGNIFICANT CHANGE UP (ref 2.5–4.5)
PLATELET # BLD AUTO: 442 K/UL — HIGH (ref 150–400)
POTASSIUM SERPL-MCNC: 3.6 MMOL/L — SIGNIFICANT CHANGE UP (ref 3.5–5.3)
POTASSIUM SERPL-SCNC: 3.6 MMOL/L — SIGNIFICANT CHANGE UP (ref 3.5–5.3)
PROT SERPL-MCNC: 7.8 G/DL — SIGNIFICANT CHANGE UP (ref 6–8.3)
PROT UR-MCNC: SIGNIFICANT CHANGE UP MG/DL
RBC # BLD: 4.25 M/UL — SIGNIFICANT CHANGE UP (ref 3.8–5.2)
RBC # FLD: 16.5 % — HIGH (ref 10.3–14.5)
RSV RNA NPH QL NAA+NON-PROBE: SIGNIFICANT CHANGE UP
SALICYLATES SERPL-MCNC: <0.3 MG/DL — LOW (ref 15–30)
SARS-COV-2 RNA SPEC QL NAA+PROBE: SIGNIFICANT CHANGE UP
SODIUM SERPL-SCNC: 133 MMOL/L — LOW (ref 135–145)
SP GR SPEC: 1.02 — SIGNIFICANT CHANGE UP (ref 1–1.03)
TOXICOLOGY SCREEN, DRUGS OF ABUSE, SERUM RESULT: SIGNIFICANT CHANGE UP
TSH SERPL-MCNC: 1.79 UIU/ML — SIGNIFICANT CHANGE UP (ref 0.27–4.2)
UROBILINOGEN FLD QL: 0.2 MG/DL — SIGNIFICANT CHANGE UP (ref 0.2–1)
WBC # BLD: 8.15 K/UL — SIGNIFICANT CHANGE UP (ref 3.8–10.5)
WBC # FLD AUTO: 8.15 K/UL — SIGNIFICANT CHANGE UP (ref 3.8–10.5)

## 2024-09-29 PROCEDURE — 99223 1ST HOSP IP/OBS HIGH 75: CPT

## 2024-09-29 PROCEDURE — 99285 EMERGENCY DEPT VISIT HI MDM: CPT

## 2024-09-29 RX ORDER — HYDROMORPHONE HYDROCHLORIDE 1 MG/ML
0.5 INJECTION, SOLUTION INTRAMUSCULAR; INTRAVENOUS; SUBCUTANEOUS ONCE
Refills: 0 | Status: DISCONTINUED | OUTPATIENT
Start: 2024-09-29 | End: 2024-09-29

## 2024-09-29 RX ORDER — ONDANSETRON HCL/PF 4 MG/2 ML
4 VIAL (ML) INJECTION EVERY 8 HOURS
Refills: 0 | Status: DISCONTINUED | OUTPATIENT
Start: 2024-09-29 | End: 2024-10-08

## 2024-09-29 RX ORDER — ACETAMINOPHEN 325 MG
525 TABLET ORAL ONCE
Refills: 0 | Status: COMPLETED | OUTPATIENT
Start: 2024-09-29 | End: 2024-09-29

## 2024-09-29 RX ORDER — CLONAZEPAM 1 MG
0.5 TABLET ORAL
Refills: 0 | Status: DISCONTINUED | OUTPATIENT
Start: 2024-09-29 | End: 2024-10-01

## 2024-09-29 RX ORDER — ACETAMINOPHEN 325 MG
650 TABLET ORAL EVERY 6 HOURS
Refills: 0 | Status: DISCONTINUED | OUTPATIENT
Start: 2024-09-29 | End: 2024-10-07

## 2024-09-29 RX ORDER — LIDOCAINE 50 MG/G
1 CREAM TOPICAL DAILY
Refills: 0 | Status: DISCONTINUED | OUTPATIENT
Start: 2024-09-29 | End: 2024-10-07

## 2024-09-29 RX ORDER — PANCRELIPASE 8000; 30250; 28750 [USP'U]/1; [USP'U]/1; [USP'U]/1
1 CAPSULE, DELAYED RELEASE ORAL
Refills: 0 | Status: DISCONTINUED | OUTPATIENT
Start: 2024-09-29 | End: 2024-10-08

## 2024-09-29 RX ORDER — KETOROLAC TROMETHAMINE 10 MG/1
15 TABLET, FILM COATED ORAL ONCE
Refills: 0 | Status: DISCONTINUED | OUTPATIENT
Start: 2024-09-29 | End: 2024-09-29

## 2024-09-29 RX ORDER — MAG HYDROX/ALUMINUM HYD/SIMETH 200-200-20
30 SUSPENSION, ORAL (FINAL DOSE FORM) ORAL EVERY 4 HOURS
Refills: 0 | Status: DISCONTINUED | OUTPATIENT
Start: 2024-09-29 | End: 2024-10-08

## 2024-09-29 RX ORDER — PREGABALIN 25 MG/1
25 CAPSULE ORAL AT BEDTIME
Refills: 0 | Status: DISCONTINUED | OUTPATIENT
Start: 2024-09-29 | End: 2024-09-29

## 2024-09-29 RX ORDER — 5-HYDROXYTRYPTOPHAN (5-HTP) 100 MG
3 TABLET,DISINTEGRATING ORAL AT BEDTIME
Refills: 0 | Status: DISCONTINUED | OUTPATIENT
Start: 2024-09-29 | End: 2024-10-08

## 2024-09-29 RX ORDER — PREGABALIN 25 MG/1
25 CAPSULE ORAL AT BEDTIME
Refills: 0 | Status: DISCONTINUED | OUTPATIENT
Start: 2024-09-29 | End: 2024-10-03

## 2024-09-29 RX ORDER — INFLUENZA VIRUS VACCINE 15; 15; 15; 15 UG/.5ML; UG/.5ML; UG/.5ML; UG/.5ML
0.5 SUSPENSION INTRAMUSCULAR ONCE
Refills: 0 | Status: DISCONTINUED | OUTPATIENT
Start: 2024-09-29 | End: 2024-10-08

## 2024-09-29 RX ORDER — HYDROMORPHONE HYDROCHLORIDE 1 MG/ML
0.5 INJECTION, SOLUTION INTRAMUSCULAR; INTRAVENOUS; SUBCUTANEOUS ONCE
Refills: 0 | Status: DISCONTINUED | OUTPATIENT
Start: 2024-09-29 | End: 2024-09-30

## 2024-09-29 RX ORDER — OXYCODONE HYDROCHLORIDE 30 MG/1
5 TABLET, FILM COATED, EXTENDED RELEASE ORAL ONCE
Refills: 0 | Status: DISCONTINUED | OUTPATIENT
Start: 2024-09-29 | End: 2024-09-29

## 2024-09-29 RX ORDER — OXYCODONE HYDROCHLORIDE 30 MG/1
5 TABLET, FILM COATED, EXTENDED RELEASE ORAL EVERY 6 HOURS
Refills: 0 | Status: DISCONTINUED | OUTPATIENT
Start: 2024-09-29 | End: 2024-09-29

## 2024-09-29 RX ADMIN — HYDROMORPHONE HYDROCHLORIDE 0.5 MILLIGRAM(S): 1 INJECTION, SOLUTION INTRAMUSCULAR; INTRAVENOUS; SUBCUTANEOUS at 15:20

## 2024-09-29 RX ADMIN — PANCRELIPASE 1 CAPSULE(S): 8000; 30250; 28750 CAPSULE, DELAYED RELEASE ORAL at 18:32

## 2024-09-29 RX ADMIN — Medication 210 MILLIGRAM(S): at 09:30

## 2024-09-29 RX ADMIN — Medication 0.5 MILLIGRAM(S): at 21:04

## 2024-09-29 RX ADMIN — HYDROMORPHONE HYDROCHLORIDE 0.5 MILLIGRAM(S): 1 INJECTION, SOLUTION INTRAMUSCULAR; INTRAVENOUS; SUBCUTANEOUS at 14:59

## 2024-09-29 RX ADMIN — OXYCODONE HYDROCHLORIDE 5 MILLIGRAM(S): 30 TABLET, FILM COATED, EXTENDED RELEASE ORAL at 18:33

## 2024-09-29 RX ADMIN — HYDROMORPHONE HYDROCHLORIDE 0.5 MILLIGRAM(S): 1 INJECTION, SOLUTION INTRAMUSCULAR; INTRAVENOUS; SUBCUTANEOUS at 11:35

## 2024-09-29 NOTE — ED PROVIDER NOTE - CLINICAL SUMMARY MEDICAL DECISION MAKING FREE TEXT BOX
Raghu: IBS, depression. A few days ago, took excess pain meds in suicide attempt. Check Psych labs. Psych consult. 67 y/o F w/ PMHx irritable bowel syndrome, osteoporosis with severe kyphosis, depression with hx suicide attempts presenting s/p suicide attempt 2 nights ago. Patient states that 2 nights ago she took a combination of pills including Effexor, Cymbalta, doxepin, Percocet, Klonopin with the intention of killing herself.  Patient states that she has no quality of life anymore secondary to her IBS diarrhea and severe chronic back pain. Back pain not controlled on percocet, oxy, and lyrica. Patient states that she can no longer live with this pain. When asked if she is still feels that she does not want to live she states that she wants to die even more now. She endorses diarrhea similar to her chronic IBS and chronic severe back pain. No fevers, chest pain, SOB, abd pain, dysuria, numbness/tingling, homicidal ideations, thoughts of harming others, visual or auditory hallucinations. Seen for suicide attempt with similar story in 2023.        Raghu: IBS, depression. A few days ago, took excess pain meds in suicide attempt. Check Psych labs. Psych consult. 67 y/o F w/ PMHx irritable bowel syndrome, osteoporosis with severe kyphosis, depression with hx suicide attempts presenting s/p suicide attempt (1 each of Effexor, Cymbalta, doxepin, Percocet, Klonopin) 2 nights ago. States that she does not want to live anymore due to severe uncontrolled chronic back pain and IBS. Seen for suicide attempt with similar story in 2023. Pt afebrile and hemodynamically stable. Will check screening labs and consult psych. Plan for psych admission vs medicine admission for pain management with psych consult.     Raghu: IBS, depression. A few days ago, took excess pain meds in suicide attempt. Check Psych labs. Psych consult. 69 y/o F w/ PMHx irritable bowel syndrome, osteoporosis with severe kyphosis, depression with hx suicide attempts presenting s/p suicide attempt (1 each of Effexor, Cymbalta, doxepin, Percocet, Klonopin) 2 nights ago. States that she does not want to live anymore due to severe uncontrolled chronic back pain and IBS. Seen for suicide attempt with similar story in 2023. Pt afebrile and hemodynamically stable. Will check screening labs and consult psych. Plan for psych admission vs medicine admission for intractable pain with pain management with psych consults.     Raghu: IBS, depression. A few days ago, took excess pain meds in suicide attempt. Check Psych labs. Psych consult.

## 2024-09-29 NOTE — H&P ADULT - PROBLEM SELECTOR PLAN 5
- patient was recently prescribed SSRIs but as per family member does not take them because "they do not work"  - psych eval

## 2024-09-29 NOTE — ED ADULT NURSE NOTE - NSFALLHARMRISKINTERV_ED_ALL_ED

## 2024-09-29 NOTE — ED ADULT TRIAGE NOTE - CHIEF COMPLAINT QUOTE
pt with hx IBS, depression c/o of increased depression with SI, pt took handful of pain meds 2 days ago, c/o of back pain denies any nausea or vomiting, denies dysuria

## 2024-09-29 NOTE — H&P ADULT - ASSESSMENT
69 y/o F with pmhx of chronic back pain, anxiety, depression, OA, IBS hx of suicide attempt presented to the ED for new onset suicidal ideations. Admit for psych eval and pain management of chronic back pain.

## 2024-09-29 NOTE — H&P ADULT - PROBLEM SELECTOR PLAN 2
- patient currently taking 0.5mg Klonopin BID (patient usually take it at least QD and sometimes BID as per family member)  - patient also given a 0.25 klonopin BID if patient wants to wean off benzos  - c/w klonopin prn for now

## 2024-09-29 NOTE — H&P ADULT - NSHPREVIEWOFSYSTEMS_GEN_ALL_CORE
REVIEW OF SYSTEMS:    CONSTITUTIONAL: No weakness, fevers or chills  EYES/ENT: No visual changes;  No dysphagia; No sore throat; No rhinorrhea; No sinus pain/pressure  NECK: No pain or stiffness  RESPIRATORY: No cough, wheezing, hemoptysis; No shortness of breath  CARDIOVASCULAR: No chest pain or palpitations; No lower extremity edema  GASTROINTESTINAL: No abdominal or epigastric pain. No nausea, vomiting, or hematemesis; No diarrhea or constipation. No melena or hematochezia.  GENITOURINARY: No dysuria, frequency or hematuria  NEUROLOGICAL: No numbness or focal weakness  MSK: ambulates with a walker, + chronic back pain  SKIN: No itching, burning, rashes, or lesions   All other review of systems is negative unless indicated above.

## 2024-09-29 NOTE — ED PROVIDER NOTE - ATTENDING CONTRIBUTION TO CARE
I performed a face-to-face evaluation of the patient and performed a history and physical examination. I agree with the history and physical examination. If this was a PA visit, I personally saw the patient with the PA and performed a substantive portion of the visit including all aspects of the medical decision making.    IBS, depression. A few days ago, took excess pain meds in suicide attempt. Check Psych labs. Psych consult.

## 2024-09-29 NOTE — H&P ADULT - PROBLEM SELECTOR PLAN 6
-Patient cannot provide list of medications. [facility] pharmacy emailed. Day team to follow

## 2024-09-29 NOTE — H&P ADULT - NSHPLABSRESULTS_GEN_ALL_CORE
10.8   8.15  )-----------( 442      ( 29 Sep 2024 09:18 )             34.0           133[L]  |  93[L]  |  20  ----------------------------<  101[H]  3.6   |  27  |  0.41[L]    Ca    8.9      29 Sep 2024 09:18  Phos  2.8       Mg     2.00         TPro  7.8  /  Alb  3.7  /  TBili  <0.2  /  DBili  x   /  AST  22  /  ALT  10  /  AlkPhos  58                      Urinalysis Basic - ( 29 Sep 2024 20:26 )    Color: Yellow / Appearance: Cloudy / S.023 / pH: x  Gluc: x / Ketone: Negative mg/dL  / Bili: Negative / Urobili: 0.2 mg/dL   Blood: x / Protein: Trace mg/dL / Nitrite: Negative   Leuk Esterase: Negative / RBC: 4 /HPF / WBC 4 /HPF   Sq Epi: x / Non Sq Epi: 1 /HPF / Bacteria: Negative /HPF          Urinalysis with Rflx Culture (collected 29 Sep 2024 20:26)      EKG: As per my read - NSR QTc 433 ms

## 2024-09-29 NOTE — ED PROVIDER NOTE - PROGRESS NOTE DETAILS
Patient reassessed, still in pain receiving Dilaudid at time of reassessment.  Pt repeatedly stating that she wants to go home, however no capacity at this time due to continued suicidal ideation 2/2 pain. Continuously expressed desire to go home and take pills to end the pain and her life. Explained to patient and her  that it is not safe for her to be discharged at this time without proper pain control and that discharge poses a threat to her life. Explained to patient that we will attempt to control her pain and that we will make her as comfortable as possible in the hospital.

## 2024-09-29 NOTE — ED PROVIDER NOTE - PHYSICAL EXAMINATION
Gen: NAD, cachectic   Head: NCAT  HEENT: normal conjunctiva, PERRLA  Lung: no respiratory distress, speaking in full sentences, CTA b/l     CV: regular rate and rhythm, no murmurs  Abd: soft, some lower abd distension and tenderness  MSK: severe kyphosis  Neuro: AAOx3  Psych: normal affect, linear thought process, + depression, + SI Gen: NAD, cachectic, tearful and repeatedly asking to go home  Head: NCAT  HEENT: normal conjunctiva, PERRLA  Lung: no respiratory distress, speaking in full sentences, CTA b/l     CV: regular rate and rhythm, no murmurs  Abd: soft, some lower abd distension and tenderness  MSK: severe kyphosis  Neuro: AAOx3  Psych: normal affect, linear thought process, + depression, + SI

## 2024-09-29 NOTE — H&P ADULT - PROBLEM SELECTOR PLAN 4
- follow up outpatient  - no signs of sepsis or concerns of colitis - follow up outpatient  - no signs of sepsis or concerns of colitis  - c/w Creon at a lower dose

## 2024-09-29 NOTE — H&P ADULT - NSHPPHYSICALEXAM_GEN_ALL_CORE
PHYSICAL EXAM:  VITALS: Vital Signs Last 24 Hrs  T(C): 37.1 (29 Sep 2024 21:54), Max: 37.1 (29 Sep 2024 21:54)  T(F): 98.7 (29 Sep 2024 21:54), Max: 98.7 (29 Sep 2024 21:54)  HR: 74 (29 Sep 2024 21:54) (73 - 85)  BP: 132/75 (29 Sep 2024 21:54) (110/68 - 132/76)  BP(mean): --  RR: 18 (29 Sep 2024 21:54) (18 - 19)  SpO2: 98% (29 Sep 2024 21:54) (96% - 100%)    Parameters below as of 29 Sep 2024 21:54  Patient On (Oxygen Delivery Method): room air      GENERAL: NAD, comfortable at bedside  HEAD:  Atraumatic, Normocephalic  EYES: EOMI, PERRL, conjunctiva and sclera clear  ENT: Moist Mucus Membranes present, no ulcers appreciated  NECK: Supple, No JVD  CHEST/LUNG: Clear to auscultation bilaterally; No wheezes, rales or rhonchi, no accessory muscle use  HEART: Regular rate and rhythm; No murmurs, rubs, or gallops, (+)S1, S2  ABDOMEN: Soft, Nontender, Nondistended; Normal Bowel sounds   EXTREMITIES: No clubbing, cyanosis, or edema, patient not able to turn for spine evaluation  PSYCH: anxious mood and labile affect  NEUROLOGY: AAOx3, non-focal, 5/5 strength in all extremities noted  SKIN: No rashes or lesions

## 2024-09-29 NOTE — H&P ADULT - HISTORY OF PRESENT ILLNESS
This is a 69 y/o F with pmhx of chronic back pain, anxiety, depression, OA, IBS hx of suicide attempt presented to the ED for new onset suicidal ideations. The patient states that she has chronic back pain and that she wants to end it all. Hx of overdosing on medications in the past in a suicide attempt. Today she can no longer tolerate the pain and wants to "end it all". Pain described as burning pain along the spinal area. No clear plan for SI. The patient's chronic back pain started last year. She cannot lay flat for CT scans because of kyphosis. She also has IBS with diarrhea in the AM which improves. No blood in stool. ROS otherwise negative. Denies LE weakness, denies stool or urinary incontinence. Currently denies SI at bedside.

## 2024-09-29 NOTE — H&P ADULT - PROBLEM SELECTOR PLAN 3
- patient with chronic back pain, poor follow up outpatient  - pain management consult  - patient cannot lay flat for CT scan or MRI studies  - will stat lidocaine patch  - will start lyrica 25mg QD for neuropathic pain  - PT eval

## 2024-09-29 NOTE — ED ADULT NURSE NOTE - OBJECTIVE STATEMENT
pt received spot 11. pt A+Ox3, hx of advanced osteoporosis and chronic back pain, states her pain medications are not working anymore and took extra dose of all her medications to stop the pain. states she doesn't want to live anymore because of the pain. respirations even and unlabored. pt denies abd pain/nausea/vomiting. pt placed on CO fo suicidal ideations. belonging secured. placed on monitor. labs sent. IVS in place. will monitor.

## 2024-09-29 NOTE — CHART NOTE - NSCHARTNOTEFT_GEN_A_CORE
A	Y		09/26/2024	09/26/2024	pregabalin 25 mg capsule	10	10	[facility]	[id number]  A	Y	B	09/19/2024	09/20/2024	clonazepam 0.25 mg odt	10	10	[facility]	[id number]	Ins[facility] #[id number]  A	N	B	09/19/2024	09/19/2024	clonazepam 0.5 mg tablet	15	7	[facility]	[id number]	Ins[facility] #[id number]  A	N	B	09/12/2024	09/12/2024	clonazepam 0.5 mg tablet	15	7	[facility]	[id number]	Ins[facility] #[id number]  A	N	B	09/03/2024	09/03/2024	clonazepam 0.5 mg tablet	15	7	[facility]	[id number]	Insurance	[facility] #[id number]  A	N	O	08/28/2024	08/30/2024	oxycodone hcl 5 mg/5 ml soln	120ml	20	Hawa Bhardwaj	[id number]	Insurance	[facility] #[id number]  A	N	O	08/16/2024	08/16/2024	morphine sulf 100 mg/5 ml conc	30ml	20	Hawa Bhardwaj	[id number]	Insurance	[facility] #[id number]  A	N	O	08/07/2024	08/10/2024	diphenoxylate-atropine 2.5-0.025 mg tablet	60	30	Eddi Schreiber	[id number]	Insurance	[facility] #[id number]  A	N	B	07/23/2024	07/25/2024	lorazepam 0.5 mg tablet	120	12	Hawa Bhardwaj	[id number]	Insurance	[facility] #[id number]  A	N		07/23/2024	07/23/2024	eszopiclone 1 mg tablet	14	14	Calebjose Kwame	[id number]	Insurance	[facility] #[id number]  A	N	B	05/30/2024	06/01/2024	clonazepam 0.5 mg tablet	60	30	Kelly Robledo	[id number]	Insurance	[facility] #[id number]  A	N		05/13/2024	05/14/2024	zolpidem tartrate 5 mg tablet	30	30	Kelly Robledo	[id number]	Insurance	[facility] #[id number]  A	N	B	03/14/2024	03/16/2024	clonazepam 0.5 mg tablet	60	30	Venkat, Kelly	[id number]	Insurance	[facility] #[id number]  A	N	B	02/28/2024	03/02/2024	clonazepam 0.25 mg odt	30	15	Lula Ojeda	[id number]	Insurance	[facility] #[id number]  A	N	O	02/15/2024	02/22/2024	oxycodone-acetaminophen 5-325 mg tablet	60	30	Venkat, Kelly	[id number]	Insurance	[facility] #[id number]  A	N	B	02/12/2024	02/15/2024	clonazepam 0.5 mg tablet	60	30	Venkat, Kelly	[id number]	Insurance	[facility] #[id number]  A	N	B	01/30/2024	01/30/2024	clonazepam 0.25 mg odt	30	15	Rusty Lula	[id number]	Insurance	[facility] #[id number]  A	N	B	01/04/2024	01/05/2024	clonazepam 0.5 mg tablet	60	30	Venkat, Kelly	[id number]	Insurance	[facility] #[id number]  A	N	B	12/18/2023	12/21/2023	alprazolam 0.25 mg tablet	60	30	Venkat, Kelly	[id number]	Insurance	[facility] #[id number]  A	N	O	12/04/2023	12/16/2023	oxycodone-acetaminophen 5-325 mg tablet	60	30	Venkat, Kelly	[id number]	[facility] #[id number]  A	N	O	11/26/2023	11/26/2023	oxycodone-acetaminophen 5-325 mg tablet	7	7	NataJonah bairesyl	[id number]	Insurance	[facility] #[id number]  A	N	O	11/14/2023	11/18/2023	tramadol hcl 50 mg tablet	90	30	Venkat, Kelly	[id number]	Cash	[facility] #[id number]  A	N	B	11/14/2023	11/18/2023	alprazolam 0.25 mg tablet	60	30	Venkat, Kelly	[id number]	Insurance	[facility] #[id number]  B	N		01/30/2024	02/07/2024	symmetry (1:1) 2.5mgthc and 2.5 mgcbd/0.5 ml tincture	1	6	Lula Ojeda	[id number]	[employer]	[facility]  C	N	O	07/18/2024	07/22/2024	oxycodone hcl 5 mg/5 ml soln	100	5	Amilcar Perry M	[id number]	Insurance	[facility]  C	N	B	07/18/2024	07/22/2024	lorazepam powder *	0	9	Amilcar Perry M	[id number]	Insurance	[facility]  C	N	B	07/09/2024	07/10/2024	lorazepam powder *	0	5	Amilcar Perry M	[id number]	Insurance	[facility]  C	N	B	07/08/2024	07/09/2024	lorazepam powder *	0	9	Amilcar Perry M	[id number]	Insurance	[facility]  C	N	O	06/27/2024	06/28/2024	oxycodone hcl 5 mg/5 ml soln	100	4	Amilcar Perry M	[id number]	Insurance	[facility]  C	N	O	06/25/2024	06/26/2024	oxycodone hcl 5 mg/5 ml soln	100	7	Amilcar Perry M	[id number]	Insurance	[facility]  C	N	B	06/25/2024	06/26/2024	lorazepam powder *	0	10	Amilcar Perry M	[id number]	Insurance	[facility]  C	N	O	06/21/2024	06/25/2024	oxycodone hcl 5 mg/5 ml soln	100	7	Amilcar Perry M	[id number]	Insurance	[facility]  C	N	O	06/18/2024	06/24/2024	morphine sulfate powder *	0	7	Amilcar Perry M	[id number]	Insurance	[facility]  C	N	B	06/18/2024	06/24/2024	lorazepam powder *	0	7	Amilcar Perry M	[id number]	Insurance	[facility]  C	N	O	06/13/2024	06/18/2024	morphine sulfate powder *	0	5	Amilcar Perry M	[id number]	Cash	[facility]  C	N	B	06/13/2024	06/18/2024	lorazepam powder *	0	4	Amilcar Perry M	[id number]	Cash	[facility]  C	N	O	06/11/2024	06/11/2024	morphine sulf 100 mg/5 ml conc	30ml	8	Angela Peñaloza MD)	[id number]	Insurance	[facility]

## 2024-09-29 NOTE — PATIENT PROFILE ADULT - FALL HARM RISK - HARM RISK INTERVENTIONS
Assistance with ambulation/Assistance OOB with selected safe patient handling equipment/Communicate Risk of Fall with Harm to all staff/Discuss with provider need for PT consult/Monitor for mental status changes/Monitor gait and stability/Provide patient with walking aids - walker, cane, crutches/Reinforce activity limits and safety measures with patient and family/Reorient to person, place and time as needed/Sit up slowly, dangle for a short time, stand at bedside before walking/Tailored Fall Risk Interventions/Use of alarms - bed, chair and/or voice tab/Visual Cue: Yellow wristband and red socks/Bed in lowest position, wheels locked, appropriate side rails in place/Call bell, personal items and telephone in reach/Instruct patient to call for assistance before getting out of bed or chair/Non-slip footwear when patient is out of bed/[place] to call system/Physically safe environment - no spills, clutter or unnecessary equipment/Purposeful Proactive Rounding/Room/bathroom lighting operational, light cord in reach

## 2024-09-30 LAB
FOLATE SERPL-MCNC: >20 NG/ML — HIGH (ref 3.1–17.5)
VIT B12 SERPL-MCNC: 740 PG/ML — SIGNIFICANT CHANGE UP (ref 200–900)

## 2024-09-30 PROCEDURE — 99233 SBSQ HOSP IP/OBS HIGH 50: CPT

## 2024-09-30 PROCEDURE — 90792 PSYCH DIAG EVAL W/MED SRVCS: CPT

## 2024-09-30 RX ORDER — PREGABALIN 25 MG/1
0 CAPSULE ORAL
Qty: 0 | Refills: 0 | DISCHARGE

## 2024-09-30 RX ORDER — OXYCODONE HYDROCHLORIDE 30 MG/1
1 TABLET, FILM COATED, EXTENDED RELEASE ORAL
Refills: 0 | DISCHARGE

## 2024-09-30 RX ORDER — PANCRELIPASE 8000; 30250; 28750 [USP'U]/1; [USP'U]/1; [USP'U]/1
0 CAPSULE, DELAYED RELEASE ORAL
Qty: 0 | Refills: 0 | DISCHARGE

## 2024-09-30 RX ORDER — HYDROMORPHONE HYDROCHLORIDE 1 MG/ML
0.25 INJECTION, SOLUTION INTRAMUSCULAR; INTRAVENOUS; SUBCUTANEOUS ONCE
Refills: 0 | Status: DISCONTINUED | OUTPATIENT
Start: 2024-09-30 | End: 2024-09-30

## 2024-09-30 RX ORDER — HYDROMORPHONE HYDROCHLORIDE 1 MG/ML
0.5 INJECTION, SOLUTION INTRAMUSCULAR; INTRAVENOUS; SUBCUTANEOUS EVERY 8 HOURS
Refills: 0 | Status: DISCONTINUED | OUTPATIENT
Start: 2024-09-30 | End: 2024-10-01

## 2024-09-30 RX ORDER — HYDROMORPHONE HYDROCHLORIDE 1 MG/ML
0.2 INJECTION, SOLUTION INTRAMUSCULAR; INTRAVENOUS; SUBCUTANEOUS ONCE
Refills: 0 | Status: DISCONTINUED | OUTPATIENT
Start: 2024-09-30 | End: 2024-09-30

## 2024-09-30 RX ORDER — HYDROMORPHONE HYDROCHLORIDE 1 MG/ML
0.2 INJECTION, SOLUTION INTRAMUSCULAR; INTRAVENOUS; SUBCUTANEOUS EVERY 8 HOURS
Refills: 0 | Status: DISCONTINUED | OUTPATIENT
Start: 2024-09-30 | End: 2024-10-03

## 2024-09-30 RX ORDER — ACETAMINOPHEN 325 MG
525 TABLET ORAL ONCE
Refills: 0 | Status: COMPLETED | OUTPATIENT
Start: 2024-09-30 | End: 2024-09-30

## 2024-09-30 RX ORDER — CLONAZEPAM 1 MG
1 TABLET ORAL
Refills: 0 | DISCHARGE

## 2024-09-30 RX ORDER — PANCRELIPASE 8000; 30250; 28750 [USP'U]/1; [USP'U]/1; [USP'U]/1
1 CAPSULE, DELAYED RELEASE ORAL ONCE
Refills: 0 | Status: COMPLETED | OUTPATIENT
Start: 2024-09-30 | End: 2024-09-30

## 2024-09-30 RX ORDER — SENNOSIDES 8.6 MG
2 TABLET ORAL AT BEDTIME
Refills: 0 | Status: DISCONTINUED | OUTPATIENT
Start: 2024-09-30 | End: 2024-10-04

## 2024-09-30 RX ADMIN — PANCRELIPASE 1 CAPSULE(S): 8000; 30250; 28750 CAPSULE, DELAYED RELEASE ORAL at 01:16

## 2024-09-30 RX ADMIN — Medication 525 MILLIGRAM(S): at 12:03

## 2024-09-30 RX ADMIN — PANCRELIPASE 1 CAPSULE(S): 8000; 30250; 28750 CAPSULE, DELAYED RELEASE ORAL at 09:07

## 2024-09-30 RX ADMIN — Medication 0.5 MILLIGRAM(S): at 23:41

## 2024-09-30 RX ADMIN — HYDROMORPHONE HYDROCHLORIDE 0.5 MILLIGRAM(S): 1 INJECTION, SOLUTION INTRAMUSCULAR; INTRAVENOUS; SUBCUTANEOUS at 02:50

## 2024-09-30 RX ADMIN — HYDROMORPHONE HYDROCHLORIDE 0.2 MILLIGRAM(S): 1 INJECTION, SOLUTION INTRAMUSCULAR; INTRAVENOUS; SUBCUTANEOUS at 20:53

## 2024-09-30 RX ADMIN — HYDROMORPHONE HYDROCHLORIDE 0.5 MILLIGRAM(S): 1 INJECTION, SOLUTION INTRAMUSCULAR; INTRAVENOUS; SUBCUTANEOUS at 01:53

## 2024-09-30 RX ADMIN — PANCRELIPASE 1 CAPSULE(S): 8000; 30250; 28750 CAPSULE, DELAYED RELEASE ORAL at 18:36

## 2024-09-30 RX ADMIN — HYDROMORPHONE HYDROCHLORIDE 0.2 MILLIGRAM(S): 1 INJECTION, SOLUTION INTRAMUSCULAR; INTRAVENOUS; SUBCUTANEOUS at 22:14

## 2024-09-30 RX ADMIN — PANCRELIPASE 1 CAPSULE(S): 8000; 30250; 28750 CAPSULE, DELAYED RELEASE ORAL at 14:36

## 2024-09-30 RX ADMIN — Medication 210 MILLIGRAM(S): at 11:03

## 2024-09-30 NOTE — BH CONSULTATION LIAISON ASSESSMENT NOTE - NSBHCHARTREVIEWVS_PSY_A_CORE FT
Vital Signs Last 24 Hrs  T(C): 36.5 (30 Sep 2024 13:00), Max: 37.1 (29 Sep 2024 21:54)  T(F): 97.7 (30 Sep 2024 13:00), Max: 98.7 (29 Sep 2024 21:54)  HR: 72 (30 Sep 2024 13:00) (67 - 75)  BP: 110/73 (30 Sep 2024 13:00) (104/67 - 132/76)  BP(mean): --  RR: 18 (30 Sep 2024 13:00) (18 - 18)  SpO2: 96% (30 Sep 2024 13:00) (96% - 99%)    Parameters below as of 30 Sep 2024 13:00  Patient On (Oxygen Delivery Method): room air

## 2024-09-30 NOTE — PROGRESS NOTE ADULT - SUBJECTIVE AND OBJECTIVE BOX
Patient is a 68y old  Female who presents with a chief complaint of chronic back pain, SI (29 Sep 2024 23:25)      SUBJECTIVE / OVERNIGHT EVENTS: Pt seen and examined at 11:20am, no overnight events, pt reports chronic back pain due to compression fxs, states that she is not able to get ct or mri as she is not able to lie flat, agreeable to go for xray, has no pain management doctor, requesting for pain management, has IBS and has diarrhea,  Miles at bedside, no other new issues reported.    MEDICATIONS  (STANDING):  heparin   Injectable 5000 Unit(s) SubCutaneous every 12 hours  influenza  Vaccine (HIGH DOSE) 0.5 milliLiter(s) IntraMuscular once  lidocaine   4% Patch 1 Patch Transdermal daily  pancrelipase  (CREON  6,000 Lipase Units) 1 Capsule(s) Oral three times a day with meals  pregabalin 25 milliGRAM(s) Oral at bedtime    MEDICATIONS  (PRN):  acetaminophen     Tablet .. 650 milliGRAM(s) Oral every 6 hours PRN Temp greater or equal to 38C (100.4F), Mild Pain (1 - 3)  aluminum hydroxide/magnesium hydroxide/simethicone Suspension 30 milliLiter(s) Oral every 4 hours PRN Dyspepsia  clonazePAM  Tablet 0.5 milliGRAM(s) Oral two times a day PRN anxiety  melatonin 3 milliGRAM(s) Oral at bedtime PRN Insomnia  ondansetron Injectable 4 milliGRAM(s) IV Push every 8 hours PRN Nausea and/or Vomiting      Vital Signs Last 24 Hrs  T(C): 36.6 (30 Sep 2024 05:00), Max: 37.1 (29 Sep 2024 21:54)  T(F): 97.9 (30 Sep 2024 05:00), Max: 98.7 (29 Sep 2024 21:54)  HR: 68 (30 Sep 2024 05:00) (67 - 74)  BP: 108/68 (30 Sep 2024 05:00) (104/67 - 132/76)  BP(mean): --  RR: 18 (30 Sep 2024 05:00) (18 - 18)  SpO2: 96% (30 Sep 2024 05:00) (96% - 99%)    Parameters below as of 30 Sep 2024 05:00  Patient On (Oxygen Delivery Method): room air      CAPILLARY BLOOD GLUCOSE        I&O's Summary      PHYSICAL EXAM:  GENERAL: NAD, frail appearing female  CHEST/LUNG: Clear to auscultation bilaterally; No wheeze  HEART: Regular rate and rhythm  ABDOMEN: Soft, Nontender, mildly distended  EXTREMITIES: no LE edema  PSYCH: Calm  NEUROLOGY: AA answers questions appropriately  SKIN: dry and warm    LABS:                        10.8   8.15  )-----------( 442      ( 29 Sep 2024 09:18 )             34.0         133[L]  |  93[L]  |  20  ----------------------------<  101[H]  3.6   |  27  |  0.41[L]    Ca    8.9      29 Sep 2024 09:18  Phos  2.8       Mg     2.00         TPro  7.8  /  Alb  3.7  /  TBili  <0.2  /  DBili  x   /  AST  22  /  ALT  10  /  AlkPhos  58            Urinalysis Basic - ( 29 Sep 2024 20:26 )    Color: Yellow / Appearance: Cloudy / S.023 / pH: x  Gluc: x / Ketone: Negative mg/dL  / Bili: Negative / Urobili: 0.2 mg/dL   Blood: x / Protein: Trace mg/dL / Nitrite: Negative   Leuk Esterase: Negative / RBC: 4 /HPF / WBC 4 /HPF   Sq Epi: x / Non Sq Epi: 1 /HPF / Bacteria: Negative /HPF        RADIOLOGY & ADDITIONAL TESTS:    Imaging Personally Reviewed:    Consultant(s) Notes Reviewed:      Care Discussed with Consultants/Other Providers:

## 2024-09-30 NOTE — PHYSICAL THERAPY INITIAL EVALUATION ADULT - ADDITIONAL COMMENTS
Pt lives in a private house with her , resides on the main level, was independent with all functional mobility using a rolling walker, requires some assistance from  in order to complete ADLs at home.     Pt left semi-supine in bed, all lines intact, all needs in reach, bed alarm set, in NAD. [employer] aware. Heart rate 74 beats per minute.

## 2024-09-30 NOTE — BH CONSULTATION LIAISON ASSESSMENT NOTE - NSBHCHARTREVIEWLAB_PSY_A_CORE FT
INTERVAL LAB RESULTS:                        10.8   8.15  )-----------( 442      ( 29 Sep 2024 09:18 )             34.0         Urinalysis Basic - ( 29 Sep 2024 20:26 )    Color: Yellow / Appearance: Cloudy / S.023 / pH: x  Gluc: x / Ketone: Negative mg/dL  / Bili: Negative / Urobili: 0.2 mg/dL   Blood: x / Protein: Trace mg/dL / Nitrite: Negative   Leuk Esterase: Negative / RBC: 4 /HPF / WBC 4 /HPF   Sq Epi: x / Non Sq Epi: 1 /HPF / Bacteria: Negative /HPF        INTERVAL IMAGING STUDIES:

## 2024-09-30 NOTE — BH CONSULTATION LIAISON ASSESSMENT NOTE - HPI (INCLUDE ILLNESS QUALITY, SEVERITY, DURATION, TIMING, CONTEXT, MODIFYING FACTORS, ASSOCIATED SIGNS AND SYMPTOMS)
Patient is a 69 yo female with PPHx of depression and anxiety with no prior inpatient psychiatric hospitalizations, currently in outpatient treatment with psychiatrist Dr. Puente, and PMH significant for chronic back pain,  irritable bowel syndrome and osteoporosis with hx of compression fractures who presented s/p suicide attempt via overdose on her medication. Psychiatry consulted for concerns of suicidal ideation.    Patient seen today with  at bedside with patient's consent. She is cooperative on approach and agreeable to an evaluation. Explains that she has been living in debilitating pain secondary to severe osteoporosis. In the past year, has become bedbound, unable to lay on her back or engage in meaningful physical activity, and no longer able to attend Religious weekly. Also endorses poor sleep and overall low mood due to the pain. Patient states that her osteoporosis was so significant that earlier this year she had home hospice care to manage pain. She was discharged from home hospice as she was not taking the pain medication that was prescribed (patient reported experiencing abdominal discomfort due to the morphine at the time). States she is very sensitive to medications and also suffers from IBS with predominant diarrhea symptoms which greatly limits her appetite and choice of food. In June, also fractured her right hip which needed 3 pins. Patient states that she started feeling hopeless due to several failed medication trials that did not help with her pain. States that the only medication that has helped so far is dilaudid but that made her feel "spaced out and loopy." Pt reports prior trials of remeron (causes restlessness), lunesta/ambien (ineffective for insomnia), ativan, amitriptyline (made her feel like a zombie), effexor, cymbalta, percocet, doxepin, hydroxyzine, and klonopin. The last medication she tried was lyrica which did not help and this triggered her to attempt suicide. Reports she took several of her medications (effexor, cymbalta, doxepin, percocet, klonopin) with the intent of ending her life so that the pain will stop. Is tearful when explaining this, stating that she finds meaning in her life with her doug, loves her , and enjoys her TV shows and using motivational apps. She clarifies that her SA was solely so that she could stop the ongoing pain even though she dose not necessarily want to end her life. Admits to feeling surprised that she woke up after her SA, and decided to come to the hospital for help. Patient denies any current SI/HI and AVH. No delusions verbalized. Expresses motivation in getting better and working with pain management to control her pain.     Patient denies prior psychiatric hospitalizations. Admits to prior suicide attempts via overdose for similar reasons. Currently in outpatient care with psychiatrist Dr. Puente. Denies any alcohol/substance use.

## 2024-09-30 NOTE — PHYSICAL THERAPY INITIAL EVALUATION ADULT - PERTINENT HX OF CURRENT PROBLEM, REHAB EVAL
68 year old female with past medical history stated below, presenting status post suicide attempt 2 nights ago. Patient states that 2 nights ago she took a combination of pills including 1 each of Effexor, Cymbalta, doxepin, Percocet, Klonopin with the intention of killing herself.  Patient states that she has no quality of life anymore secondary to her IBS diarrhea and severe chronic back pain.

## 2024-09-30 NOTE — PROVIDER CONTACT NOTE (OTHER) - BACKGROUND
69 y/o F w/ PMHx irritable bowel syndrome, osteoporosis with severe kyphosis, depression with hx suicide attempts presenting s/p suicide attempt 2 nights ago.

## 2024-09-30 NOTE — BH CONSULTATION LIAISON ASSESSMENT NOTE - NS ED BHA REVIEW OF ED CHART AVAILABLE LABS REVIEWED
pt c.o mechanical fall approx two weeks ago. c.o R hip, R lower back and R leg pain. pt able to ambulate independently without difficulty.
Yes

## 2024-09-30 NOTE — PHARMACOTHERAPY INTERVENTION NOTE - COMMENTS
Medication history is complete. Medication list updated in Outpatient Medication Record (OMR)per [facility] and patient. Please call spectra [phone] if you have any questions. Medication history is complete. Medication list updated in Outpatient Medication Record (OMR)per [facility] and patient. Patient states she is not taking the following medications recently dispensed from pharmacy: pregabalin, clonazepam 0.25 mg (patient is currently taking clonazepam 0.5mg), hydroxyzine, duloxetine, lactulose, venlafaxine, morphine. Please call spectra [phone] if you have any questions.

## 2024-09-30 NOTE — BH CONSULTATION LIAISON ASSESSMENT NOTE - SUMMARY
Patient is a 67 yo female with PPHx of depression and anxiety with no prior inpatient psychiatric hospitalizations, currently in outpatient treatment with psychiatrist Dr. Puente, and PMH significant for chronic back pain,  irritable bowel syndrome and osteoporosis with hx of compression fractures who presented s/p suicide attempt via overdose on her medication. Psychiatry consulted for concerns of suicidal ideation.    On evaluation, patient cooperative albeit somewhat anxious and tearful when explaining her suffering and debilitating pain. Clarifies that her suicide attempt was solely in context of wanting her pain to stop. Admits to some hopelessness due to several failed pain medication trials, stating that lack of improvement on lyrica was the final trigger for her suicide attempt. At this time, denying any further SI and expressing motivation to work with pain management team. Discussed likely indication for inpatient psychiatric hospitalization in context of safety concerns/recent suicide attempt, although at this time patient remains bedbound with severe pain symptoms which will need to be addressed first.     Recommendations  - Continue 1:1 for suicidal ideation  - C/w klonopin 0.5 mg BID PRN For anxiety  - Recommend pain management  - may benefit from holistic nursing consult  - will benefit from  consult.   - Will plan to obtain further collateral from outpatient psychiatrist Dr. Puente ([phone]). Patient requesting that she speak to Dr. Puente first today to explain recent suicide attempt to him.   - Disposition: Due to significant safety concerns, suicidal ideation, recent suicide attempt, pt meets criteria for involuntary inpatient psychiatric hospitalization. At this time, however, patient remains bedbound and in severe pain which may not be able to be appropriately managed in psychiatric unit. Recommending ongoing pain control, medical workup/clearance and [facility] psychiatry will continue to assess.

## 2024-09-30 NOTE — PROVIDER CONTACT NOTE (OTHER) - RECOMMENDATIONS
ACP Bonnie was notified and recommended the patient uses compression sequential devices instead. Patient adamantly refused that intervention as well after further reeducation and understanding.

## 2024-09-30 NOTE — PROGRESS NOTE ADULT - PROBLEM SELECTOR PLAN 3
- patient with chronic back pain, poor follow up outpatient  - pain management consult  - patient cannot lay flat for CT scan or MRI studies, agreeable for xrays, will check c/t/l spine xrays  - cont lidocaine patch  - will start lyrica 25mg QD for neuropathic pain  - PT eval- no skilled PT needs

## 2024-09-30 NOTE — BH CONSULTATION LIAISON ASSESSMENT NOTE - RISK ASSESSMENT
Patient has chronic elevated risk for self harm given age, h/o psychiatric diagnosis, and chronic pain. Patient also has active psychiatric sx, poor sleep patterns, and presented with recent suicide attempt.   Protective factors include positive therapeutic relationship with psychiatrist, family support from , Hinduism beliefs, and pet cat.

## 2024-09-30 NOTE — BH CONSULTATION LIAISON ASSESSMENT NOTE - NSBHATTESTCOMMENTATTENDFT_PSY_A_CORE
67 yo female with PPHx of depression and anxiety, p/w severe osteoporosis and worsening chronic pain, p/w suicide attempt. Patient admits frankly to SA due to severe untractable pain. Admits to depression in this context, along with poor sleep. Does not want inpt psych, though told that SA typically would align with inpt psych admission. Barriers to this would include continued pain which is unlikely to be mitigated by inpt psych, severe deconditioning, bedbound status which likely requires hospital bed. Would ensure adequate pain control whilst mgmt of sfx which patient has been fastidious/vigilant of. Still needs inpt psych. Psych will follow closely for dispo. Meds as above.

## 2024-09-30 NOTE — BH CONSULTATION LIAISON ASSESSMENT NOTE - CURRENT MEDICATION
MEDICATIONS  (STANDING):  heparin   Injectable 5000 Unit(s) SubCutaneous every 12 hours  influenza  Vaccine (HIGH DOSE) 0.5 milliLiter(s) IntraMuscular once  lidocaine   4% Patch 1 Patch Transdermal daily  pancrelipase  (CREON  6,000 Lipase Units) 1 Capsule(s) Oral three times a day with meals  pregabalin 25 milliGRAM(s) Oral at bedtime  senna 2 Tablet(s) Oral at bedtime    MEDICATIONS  (PRN):  acetaminophen     Tablet .. 650 milliGRAM(s) Oral every 6 hours PRN Temp greater or equal to 38C (100.4F), Mild Pain (1 - 3)  aluminum hydroxide/magnesium hydroxide/simethicone Suspension 30 milliLiter(s) Oral every 4 hours PRN Dyspepsia  clonazePAM  Tablet 0.5 milliGRAM(s) Oral two times a day PRN anxiety  HYDROmorphone   Tablet 0.5 milliGRAM(s) Oral every 8 hours PRN moderate to severe pain (4-10)  HYDROmorphone  Injectable 0.2 milliGRAM(s) IV Push every 8 hours PRN severe breakthrough pain/prior to imaging  melatonin 3 milliGRAM(s) Oral at bedtime PRN Insomnia  ondansetron Injectable 4 milliGRAM(s) IV Push every 8 hours PRN Nausea and/or Vomiting  polyethylene glycol 3350 17 Gram(s) Oral daily PRN Constipation

## 2024-09-30 NOTE — BH CONSULTATION LIAISON ASSESSMENT NOTE - NSSUICPROTFACT_PSY_ALL_CORE
Identifies reasons for living/Supportive social network of family or friends/Positive therapeutic relationships/Rastafari beliefs/Beloved pets

## 2024-09-30 NOTE — PHYSICAL THERAPY INITIAL EVALUATION ADULT - GENERAL OBSERVATIONS, REHAB EVAL
Pt received semi-supine in bed, +telemetry, all lines intact, in NAD. Pt's  at bedside. Agreeable to PT evaluation.

## 2024-09-30 NOTE — CONSULT NOTE ADULT - SUBJECTIVE AND OBJECTIVE BOX
Chief Complaint: back pain    HPI:  This is a 69 y/o F with pmhx of chronic back pain, anxiety, depression, OA, IBS hx of suicide attempt presented to the ED for new onset suicidal ideations. The patient states that she has chronic back pain and that she wants to end it all. Hx of overdosing on medications in the past in a suicide attempt. Today she can no longer tolerate the pain and wants to "end it all". Pain described as burning pain along the spinal area. No clear plan for SI. The patient's chronic back pain started last year. She cannot lay flat for CT scans because of kyphosis. She also has IBS with diarrhea in the AM which improves. No blood in stool. ROS otherwise negative. Denies LE weakness, denies stool or urinary incontinence. Currently denies SI at bedside. (29 Sep 2024 23:25)      PAST MEDICAL & SURGICAL HISTORY:  Osteoporosis  IBS (irritable bowel syndrome)    No significant past surgical history      FAMILY HISTORY:  No pertinent family history in first degree relatives      SOCIAL HISTORY:  [ ] Denies Smoking, Alcohol, or Drug Use    Allergies  Actinel (Muscle Pain)  Lexapro (Other)    Intolerances      PAIN MEDICATIONS:  acetaminophen     Tablet .. 650 milliGRAM(s) Oral every 6 hours PRN  clonazePAM  Tablet 0.5 milliGRAM(s) Oral two times a day PRN  melatonin 3 milliGRAM(s) Oral at bedtime PRN  ondansetron Injectable 4 milliGRAM(s) IV Push every 8 hours PRN  pregabalin 25 milliGRAM(s) Oral at bedtime    Heme:  heparin   Injectable 5000 Unit(s) SubCutaneous every 12 hours    GI:  aluminum hydroxide/magnesium hydroxide/simethicone Suspension 30 milliLiter(s) Oral every 4 hours PRN  pancrelipase  (CREON  6,000 Lipase Units) 1 Capsule(s) Oral three times a day with meals    All Other Medications:  influenza  Vaccine (HIGH DOSE) 0.5 milliLiter(s) IntraMuscular once  lidocaine   4% Patch 1 Patch Transdermal daily      Vital Signs Last 24 Hrs  T(C): 36.6 (30 Sep 2024 05:00), Max: 37.1 (29 Sep 2024 21:54)  T(F): 97.9 (30 Sep 2024 05:00), Max: 98.7 (29 Sep 2024 21:54)  HR: 68 (30 Sep 2024 05:00) (67 - 74)  BP: 108/68 (30 Sep 2024 05:00) (104/67 - 132/76)  BP(mean): --  RR: 18 (30 Sep 2024 05:00) (18 - 18)  SpO2: 96% (30 Sep 2024 05:00) (96% - 99%)    Parameters below as of 30 Sep 2024 05:00  Patient On (Oxygen Delivery Method): room air        PAIN SCORE:         SCALE USED: (1-10 VNRS)           LABS:                          10.8   8.15  )-----------( 442      ( 29 Sep 2024 09:18 )             34.0         133[L]  |  93[L]  |  20  ----------------------------<  101[H]  3.6   |  27  |  0.41[L]    Ca    8.9      29 Sep 2024 09:18  Phos  2.8       Mg     2.00         TPro  7.8  /  Alb  3.7  /  TBili  <0.2  /  DBili  x   /  AST  22  /  ALT  10  /  AlkPhos  58        Urinalysis Basic - ( 29 Sep 2024 20:26 )    Color: Yellow / Appearance: Cloudy / S.023 / pH: x  Gluc: x / Ketone: Negative mg/dL  / Bili: Negative / Urobili: 0.2 mg/dL   Blood: x / Protein: Trace mg/dL / Nitrite: Negative   Leuk Esterase: Negative / RBC: 4 /HPF / WBC 4 /HPF   Sq Epi: x / Non Sq Epi: 1 /HPF / Bacteria: Negative /HPF        RADIOLOGY:    Drug Screen:  Drug Screen W/PCP, Urine: Done ( @ 20:26)        [x ]  [place]  Reviewed. Reference #[id number]    Summary:  Patient with a history of back pain since  s/p compression fractures, osteoporosis, OA. She had been taking Reclast infusions to treat osteoporosis but stopped last year due to side effects that caused arthralgias. The treatment was effective in reducing back pain because she is now feeling more pain and weakness. No other substitute medications have been tried. She has been unable to go to [facility] or doctor's appointments 2/2 pain and remains at home lying on her side in bed. Patient is ambulatory with assistive device. The pain is in her entire back to the buttocks and she describes it as a constant burning pain that keeps her up at night. It has been worse since early this month and positioning no longer helps to relieve pain. She had seen a pain management doctor but the ride to the doctor's office was too painful and only acupuncture was offered as a solution. She sees a psychiatrist who had tried Cymbalta (causes dizziniess) at 25mg, Lyrica (not effective), Gabapentin (not effective), Amitriptyline (felt like a "zombie").   Patient had also been taking Oxycodone 1mg Q10 hours PRN which had helped. She did have a fall recently which required hip surgery and was admitted to hospice care from 2024 and discharged 2024. However, the Oxycodone stopped being effective and they had the patient taking larger Oxy dose which resulted in GI upset and she stopped using. Morphine was also tried and that caused even more GI upset so patient refused to take the opioids and was discharged from hospice because she was "noncompliant". Currently, the only medication that has helped was IV Dilaudid 0.5mg but she felt like she was "floating in space". Patient is hesitant to try any medications due to concerns with side effects but would like to find alternative osteoporosis treatment.     PHYSICAL EXAM:  GENERAL: Alert & Oriented x 3 in NAD. Sitting in bed.     Recommendations:   -  Consider ordering PO Dilaudid 0.5mg Q8H PRN moderate - severe pain. Hold for sedation. Not to be given within 1 hour of any immediate acting opioid. IF patient tolerates well and still having pain, may consider increasing dose to 1mg.  -  Consider ordering IV Dilaudid 0.2mg Q8H severe breakthrough pain/prior to imaging. Hold for sedation. Not to be given within 1 hour of any immediate acting opioid.  -  Recommend maintaining continuous pulse oximetry.  -  Recommend Rheumatology or Endocrinology consult for osteoporosis   -  Recommend follow up with Chronic Pain doctor when discharged. If patient does not have a Chronic Pain doctor, may acquire one through patient's personal insurance carrier.    Will notify Chronic Pain attending on call, Dr. Davis. Awaiting call back.  No further recommendations at this time, Chronic pain service to sign off. May call Chronic Pain Service if needed.   Thank you.     Chief Complaint: back pain    HPI:  This is a 67 y/o F with pmhx of chronic back pain, anxiety, depression, OA, IBS hx of suicide attempt presented to the ED for new onset suicidal ideations. The patient states that she has chronic back pain and that she wants to end it all. Hx of overdosing on medications in the past in a suicide attempt. Today she can no longer tolerate the pain and wants to "end it all". Pain described as burning pain along the spinal area. No clear plan for SI. The patient's chronic back pain started last year. She cannot lay flat for CT scans because of kyphosis. She also has IBS with diarrhea in the AM which improves. No blood in stool. ROS otherwise negative. Denies LE weakness, denies stool or urinary incontinence. Currently denies SI at bedside. (29 Sep 2024 23:25)      PAST MEDICAL & SURGICAL HISTORY:  Osteoporosis  IBS (irritable bowel syndrome)    No significant past surgical history      FAMILY HISTORY:  No pertinent family history in first degree relatives      SOCIAL HISTORY:  [ ] Denies Smoking, Alcohol, or Drug Use    Allergies  Actinel (Muscle Pain)  Lexapro (Other)    Intolerances      PAIN MEDICATIONS:  acetaminophen     Tablet .. 650 milliGRAM(s) Oral every 6 hours PRN  clonazePAM  Tablet 0.5 milliGRAM(s) Oral two times a day PRN  melatonin 3 milliGRAM(s) Oral at bedtime PRN  ondansetron Injectable 4 milliGRAM(s) IV Push every 8 hours PRN  pregabalin 25 milliGRAM(s) Oral at bedtime    Heme:  heparin   Injectable 5000 Unit(s) SubCutaneous every 12 hours    GI:  aluminum hydroxide/magnesium hydroxide/simethicone Suspension 30 milliLiter(s) Oral every 4 hours PRN  pancrelipase  (CREON  6,000 Lipase Units) 1 Capsule(s) Oral three times a day with meals    All Other Medications:  influenza  Vaccine (HIGH DOSE) 0.5 milliLiter(s) IntraMuscular once  lidocaine   4% Patch 1 Patch Transdermal daily      Vital Signs Last 24 Hrs  T(C): 36.6 (30 Sep 2024 05:00), Max: 37.1 (29 Sep 2024 21:54)  T(F): 97.9 (30 Sep 2024 05:00), Max: 98.7 (29 Sep 2024 21:54)  HR: 68 (30 Sep 2024 05:00) (67 - 74)  BP: 108/68 (30 Sep 2024 05:00) (104/67 - 132/76)  BP(mean): --  RR: 18 (30 Sep 2024 05:00) (18 - 18)  SpO2: 96% (30 Sep 2024 05:00) (96% - 99%)    Parameters below as of 30 Sep 2024 05:00  Patient On (Oxygen Delivery Method): room air        PAIN SCORE:         SCALE USED: (1-10 VNRS)           LABS:                          10.8   8.15  )-----------( 442      ( 29 Sep 2024 09:18 )             34.0         133[L]  |  93[L]  |  20  ----------------------------<  101[H]  3.6   |  27  |  0.41[L]    Ca    8.9      29 Sep 2024 09:18  Phos  2.8       Mg     2.00         TPro  7.8  /  Alb  3.7  /  TBili  <0.2  /  DBili  x   /  AST  22  /  ALT  10  /  AlkPhos  58        Urinalysis Basic - ( 29 Sep 2024 20:26 )    Color: Yellow / Appearance: Cloudy / S.023 / pH: x  Gluc: x / Ketone: Negative mg/dL  / Bili: Negative / Urobili: 0.2 mg/dL   Blood: x / Protein: Trace mg/dL / Nitrite: Negative   Leuk Esterase: Negative / RBC: 4 /HPF / WBC 4 /HPF   Sq Epi: x / Non Sq Epi: 1 /HPF / Bacteria: Negative /HPF        RADIOLOGY:    Drug Screen:  Drug Screen W/PCP, Urine: Done ( @ 20:26)        [x ]  [place]  Reviewed. Reference #[id number]    Summary:  Patient with a history of back pain since  s/p compression fractures, osteoporosis, OA. She had been taking Reclast infusions to treat osteoporosis but stopped last year due to side effects that caused arthralgias. The treatment was effective in reducing back pain because she is now feeling more pain and weakness. No other substitute medications have been tried. She has been unable to go to [facility] or doctor's appointments 2/2 pain and remains at home lying on her side in bed. Patient is ambulatory with assistive device. The pain is in her entire back to the buttocks and she describes it as a constant burning pain that keeps her up at night. It has been worse since early this month and positioning no longer helps to relieve pain. She had seen a pain management doctor but the ride to the doctor's office was too painful and only acupuncture was offered as a solution. She sees a psychiatrist who had tried Cymbalta (causes dizziniess) at 25mg, Lyrica (not effective), Gabapentin (not effective), Amitriptyline (felt like a "zombie"). She has refused imaging as it is too painful and she is unable to lay flat.  Patient had also been taking Oxycodone 1mg Q10 hours PRN which had helped. She did have a fall recently which required hip surgery and was admitted to hospice care from 2024 and discharged 2024. However, the Oxycodone stopped being effective and they had the patient taking larger Oxy dose which resulted in GI upset and she stopped using. Morphine was also tried and that caused even more GI upset so patient refused to take the opioids and was discharged from hospice because she was "noncompliant". Currently, the only medication that has helped was IV Dilaudid 0.5mg but she felt like she was "floating in space". After lengthy discussion with patient, she remains hesitant and has been refusing to try any medications due to concerns with side effects but would like to find alternative osteoporosis treatment.     PHYSICAL EXAM:  GENERAL: Alert & Oriented x 3 in NAD. Sitting in bed.     Recommendations:   -  Consider ordering Tylenol 650mg Q6H standing x 2 days, then PRN. Not to be given within 6 hours of last dose of Acetaminophen.  -  Consider ordering PO Dilaudid half tablet of 1mg tablet (0.5mg dose) Q8H PRN moderate - severe pain. Hold for sedation. Not to be given within 1 hour of any immediate acting opioid. IF patient tolerates well and still having pain, may consider increasing dose to 1mg.  -  Consider ordering IV Dilaudid 0.2mg Q8H severe breakthrough pain/prior to imaging. Hold for sedation. Not to be given within 1 hour of any immediate acting opioid.  -  Recommend hot and cold compresses.  -  Recommend maintaining continuous pulse oximetry.  -  Recommend imaging/imaging with sedation to determine underlying cause of severe pain and if subsequent procedural intervention is warranted.  -  Recommend Rheumatology or Endocrinology consult for osteoporosis  -  Recommend Holistic RN for complementary and alternative therapies for pain, using a mid-body-spirit-emotion approach.  -  Recommend Physical Therapy consult for strengthening exercises to reduce deconditioning.  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  -  Recommend follow up with Chronic Pain doctor when discharged. If patient does not have a Chronic Pain doctor, may acquire one through patient's personal insurance carrier.  Discussed patient with Chronic Pain Attending on call, Dr. Davis, who agrees with the above recommendations.  No further recommendations at this time, Chronic pain service to sign off. May call Chronic Pain Service if needed.   Thank you.

## 2024-10-01 DIAGNOSIS — Z87.81 PERSONAL HISTORY OF (HEALED) TRAUMATIC FRACTURE: ICD-10-CM

## 2024-10-01 DIAGNOSIS — D64.9 ANEMIA, UNSPECIFIED: ICD-10-CM

## 2024-10-01 DIAGNOSIS — M48.50XA COLLAPSED VERTEBRA, NOT ELSEWHERE CLASSIFIED, SITE UNSPECIFIED, INITIAL ENCOUNTER FOR FRACTURE: ICD-10-CM

## 2024-10-01 DIAGNOSIS — M81.0 AGE-RELATED OSTEOPOROSIS WITHOUT CURRENT PATHOLOGICAL FRACTURE: ICD-10-CM

## 2024-10-01 LAB
ADD ON TEST-SPECIMEN IN LAB: SIGNIFICANT CHANGE UP
ANION GAP SERPL CALC-SCNC: 12 MMOL/L — SIGNIFICANT CHANGE UP (ref 7–14)
BUN SERPL-MCNC: 15 MG/DL — SIGNIFICANT CHANGE UP (ref 7–23)
CALCIUM SERPL-MCNC: 9.1 MG/DL — SIGNIFICANT CHANGE UP (ref 8.4–10.5)
CHLORIDE SERPL-SCNC: 96 MMOL/L — LOW (ref 98–107)
CO2 SERPL-SCNC: 24 MMOL/L — SIGNIFICANT CHANGE UP (ref 22–31)
CREAT SERPL-MCNC: 0.43 MG/DL — LOW (ref 0.5–1.3)
EGFR: 107 ML/MIN/1.73M2 — SIGNIFICANT CHANGE UP
GLUCOSE SERPL-MCNC: 89 MG/DL — SIGNIFICANT CHANGE UP (ref 70–99)
HCT VFR BLD CALC: 32.7 % — LOW (ref 34.5–45)
HGB BLD-MCNC: 10.6 G/DL — LOW (ref 11.5–15.5)
MAGNESIUM SERPL-MCNC: 2 MG/DL — SIGNIFICANT CHANGE UP (ref 1.6–2.6)
MCHC RBC-ENTMCNC: 25.4 PG — LOW (ref 27–34)
MCHC RBC-ENTMCNC: 32.4 GM/DL — SIGNIFICANT CHANGE UP (ref 32–36)
MCV RBC AUTO: 78.4 FL — LOW (ref 80–100)
NRBC # BLD: 0 /100 WBCS — SIGNIFICANT CHANGE UP (ref 0–0)
NRBC # FLD: 0 K/UL — SIGNIFICANT CHANGE UP (ref 0–0)
PHOSPHATE SERPL-MCNC: 3 MG/DL — SIGNIFICANT CHANGE UP (ref 2.5–4.5)
PLATELET # BLD AUTO: 449 K/UL — HIGH (ref 150–400)
POTASSIUM SERPL-MCNC: 3.7 MMOL/L — SIGNIFICANT CHANGE UP (ref 3.5–5.3)
POTASSIUM SERPL-SCNC: 3.7 MMOL/L — SIGNIFICANT CHANGE UP (ref 3.5–5.3)
RBC # BLD: 4.17 M/UL — SIGNIFICANT CHANGE UP (ref 3.8–5.2)
RBC # FLD: 16.5 % — HIGH (ref 10.3–14.5)
SODIUM SERPL-SCNC: 132 MMOL/L — LOW (ref 135–145)
WBC # BLD: 6.15 K/UL — SIGNIFICANT CHANGE UP (ref 3.8–10.5)
WBC # FLD AUTO: 6.15 K/UL — SIGNIFICANT CHANGE UP (ref 3.8–10.5)

## 2024-10-01 PROCEDURE — 99222 1ST HOSP IP/OBS MODERATE 55: CPT | Mod: GC

## 2024-10-01 PROCEDURE — 99232 SBSQ HOSP IP/OBS MODERATE 35: CPT

## 2024-10-01 RX ORDER — ANTACID TABLETS 500 MG/1
1 TABLET, CHEWABLE ORAL
Refills: 0 | Status: DISCONTINUED | OUTPATIENT
Start: 2024-10-01 | End: 2024-10-02

## 2024-10-01 RX ORDER — HYDROMORPHONE HYDROCHLORIDE 1 MG/ML
0.5 INJECTION, SOLUTION INTRAMUSCULAR; INTRAVENOUS; SUBCUTANEOUS EVERY 8 HOURS
Refills: 0 | Status: DISCONTINUED | OUTPATIENT
Start: 2024-10-01 | End: 2024-10-02

## 2024-10-01 RX ORDER — HYDROMORPHONE HYDROCHLORIDE 1 MG/ML
0.2 INJECTION, SOLUTION INTRAMUSCULAR; INTRAVENOUS; SUBCUTANEOUS EVERY 8 HOURS
Refills: 0 | Status: DISCONTINUED | OUTPATIENT
Start: 2024-10-01 | End: 2024-10-01

## 2024-10-01 RX ORDER — FERROUS SULFATE 325(65) MG
325 TABLET ORAL DAILY
Refills: 0 | Status: DISCONTINUED | OUTPATIENT
Start: 2024-10-01 | End: 2024-10-08

## 2024-10-01 RX ORDER — HYDROMORPHONE HYDROCHLORIDE 1 MG/ML
0.2 INJECTION, SOLUTION INTRAMUSCULAR; INTRAVENOUS; SUBCUTANEOUS ONCE
Refills: 0 | Status: DISCONTINUED | OUTPATIENT
Start: 2024-10-01 | End: 2024-10-01

## 2024-10-01 RX ORDER — CRANBERRY FRUIT EXTRACT 650 MG
1000 CAPSULE ORAL DAILY
Refills: 0 | Status: DISCONTINUED | OUTPATIENT
Start: 2024-10-01 | End: 2024-10-08

## 2024-10-01 RX ORDER — DIAZEPAM 10 MG/1
5 TABLET ORAL
Refills: 0 | Status: DISCONTINUED | OUTPATIENT
Start: 2024-10-01 | End: 2024-10-03

## 2024-10-01 RX ADMIN — PANCRELIPASE 1 CAPSULE(S): 8000; 30250; 28750 CAPSULE, DELAYED RELEASE ORAL at 15:34

## 2024-10-01 RX ADMIN — Medication 1000 UNIT(S): at 21:11

## 2024-10-01 RX ADMIN — PANCRELIPASE 1 CAPSULE(S): 8000; 30250; 28750 CAPSULE, DELAYED RELEASE ORAL at 11:20

## 2024-10-01 RX ADMIN — HYDROMORPHONE HYDROCHLORIDE 0.2 MILLIGRAM(S): 1 INJECTION, SOLUTION INTRAMUSCULAR; INTRAVENOUS; SUBCUTANEOUS at 23:17

## 2024-10-01 RX ADMIN — Medication 2 TABLET(S): at 21:10

## 2024-10-01 RX ADMIN — DIAZEPAM 5 MILLIGRAM(S): 10 TABLET ORAL at 18:01

## 2024-10-01 RX ADMIN — HYDROMORPHONE HYDROCHLORIDE 0.5 MILLIGRAM(S): 1 INJECTION, SOLUTION INTRAMUSCULAR; INTRAVENOUS; SUBCUTANEOUS at 17:53

## 2024-10-01 RX ADMIN — Medication 325 MILLIGRAM(S): at 21:11

## 2024-10-01 RX ADMIN — HYDROMORPHONE HYDROCHLORIDE 0.5 MILLIGRAM(S): 1 INJECTION, SOLUTION INTRAMUSCULAR; INTRAVENOUS; SUBCUTANEOUS at 16:53

## 2024-10-01 RX ADMIN — ANTACID TABLETS 1 TABLET(S): 500 TABLET, CHEWABLE ORAL at 18:01

## 2024-10-01 NOTE — DIETITIAN INITIAL EVALUATION ADULT - ADD RECOMMEND
1. Encourage & assist Pt with meals; Monitor PO diet tolerance;   2. Honor food preferences;   3. Add Lactobacillus Acidophilus (probiotic supplementation) to promote improved gut function;   4. Continue Creon, per MD discretion;   5. Consider adding Calcium with Vit D supplementation;   6. Monitor labs, hydration status;   7. If Pt's PO intake remains inadequate, suggest initiating alternative means of nutrition support (PN support);

## 2024-10-01 NOTE — CONSULT NOTE ADULT - SUBJECTIVE AND OBJECTIVE BOX
HPI:  This is a 67 y/o F with pmhx of chronic back pain, anxiety, depression, OA, IBS hx of suicide attempt presented to the ED for new onset suicidal ideations. The patient states that she has chronic back pain and that she wants to end it all. Hx of overdosing on medications in the past in a suicide attempt. Today she can no longer tolerate the pain and wants to "end it all". Pain described as burning pain along the spinal area. No clear plan for SI. The patient's chronic back pain started last year. She cannot lay flat for CT scans because of kyphosis. She also has IBS with diarrhea in the AM which improves. No blood in stool. ROS otherwise negative. Denies LE weakness, denies stool or urinary incontinence. Currently denies SI at bedside. (29 Sep 2024 23:25)      ENDOCRINE HX:    PAST MEDICAL & SURGICAL HISTORY:  Osteoporosis      IBS (irritable bowel syndrome)      No significant past surgical history          FAMILY HISTORY:  No pertinent family history in first degree relatives        Social History:    Outpatient Medications:    MEDICATIONS  (STANDING):  heparin   Injectable 5000 Unit(s) SubCutaneous every 12 hours  influenza  Vaccine (HIGH DOSE) 0.5 milliLiter(s) IntraMuscular once  lidocaine   4% Patch 1 Patch Transdermal daily  pancrelipase  (CREON  6,000 Lipase Units) 1 Capsule(s) Oral three times a day with meals  pregabalin 25 milliGRAM(s) Oral at bedtime  senna 2 Tablet(s) Oral at bedtime    MEDICATIONS  (PRN):  acetaminophen     Tablet .. 650 milliGRAM(s) Oral every 6 hours PRN Temp greater or equal to 38C (100.4F), Mild Pain (1 - 3)  aluminum hydroxide/magnesium hydroxide/simethicone Suspension 30 milliLiter(s) Oral every 4 hours PRN Dyspepsia  clonazePAM  Tablet 0.5 milliGRAM(s) Oral two times a day PRN anxiety  HYDROmorphone   Tablet 0.5 milliGRAM(s) Oral every 8 hours PRN moderate to severe pain (4-10)  HYDROmorphone  Injectable 0.2 milliGRAM(s) IV Push every 8 hours PRN severe breakthrough pain/prior to imaging  melatonin 3 milliGRAM(s) Oral at bedtime PRN Insomnia  ondansetron Injectable 4 milliGRAM(s) IV Push every 8 hours PRN Nausea and/or Vomiting  polyethylene glycol 3350 17 Gram(s) Oral daily PRN Constipation      Allergies    Actinel (Muscle Pain)  Lexapro (Other)    Intolerances      Review of Systems:  Constitutional: No fever  Eyes: No blurry vision  Neuro: No tremors  HEENT: + back pain  Cardiovascular: No chest pain, palpitations  Respiratory: No SOB, no cough  GI: No nausea, vomiting, abdominal pain  : No dysuria  Skin: no rash  Psych: no depression  Endocrine: no polyuria, polydipsia  Hem/lymph: no swelling  Osteoporosis: + fractures    ALL OTHER SYSTEMS REVIEWED AND NEGATIVE    PHYSICAL EXAM:  VITALS: T(C): 36.7 (10-01-24 @ 12:05)  T(F): 98 (10-01-24 @ 12:05), Max: 98.6 (09-30-24 @ 20:53)  HR: 84 (10-01-24 @ 12:05) (70 - 84)  BP: 112/73 (10-01-24 @ 12:05) (109/67 - 116/81)  RR:  (18 - 18)  SpO2:  (95% - 98%)  Wt(kg): --  GENERAL: NAD  EYES: No proptosis, no lid lag, anicteric  HEENT:  Atraumatic, Normocephalic, moist mucous membranes  RESPIRATORY: Normal respiratory effort; no audible wheezing  SKIN: Dry, intact, No rashes or lesions  NEURO: sensation intact, extraocular movements intact, no tremor  PSYCH: Alert and oriented x 3, normal affect, normal mood  CUSHING'S SIGNS: no striae      CAPILLARY BLOOD GLUCOSE                                10.6   6.15  )-----------( 449      ( 01 Oct 2024 06:15 )             32.7       10-01    132[L]  |  96[L]  |  15  ----------------------------<  89  3.7   |  24  |  0.43[L]    eGFR: 107    Ca    9.1      10-01  Mg     2.00     10-01  Phos  3.0     10-01    TPro  7.8  /  Alb  3.7  /  TBili  <0.2  /  DBili  x   /  AST  22  /  ALT  10  /  AlkPhos  58  09-29      Thyroid Function Tests:  09-29 @ 09:18 TSH 1.79 FreeT4 -- T3 -- Anti TPO -- Anti Thyroglobulin Ab -- TSI --              Radiology:                HPI:  This is a 69 y/o F with pmhx of chronic back pain, anxiety, depression, OA, IBS hx of suicide attempt presented to the ED for new onset suicidal ideations. The patient states that she has chronic back pain and that she wants to end it all. Hx of overdosing on medications in the past in a suicide attempt. Today she can no longer tolerate the pain and wants to "end it all". Pain described as burning pain along the spinal area. No clear plan for SI. The patient's chronic back pain started last year. She cannot lay flat for CT scans because of kyphosis. She also has IBS with diarrhea in the AM which improves. No blood in stool. ROS otherwise negative. Denies LE weakness, denies stool or urinary incontinence. Currently denies SI at bedside. (29 Sep 2024 23:25)      ENDOCRINE HX:    History obtained from patient and  at bedside. Attempted to call patient's rheumatologist Dr. Melo ([phone]) w/ no response, mailbox full.    Patient initially diagnosed in 2017 w/ osteoporosis after she had a "foot fracture."   She also has a hx of a right hip fx s/p pin in 6/2024 and multiple spinal compression fractures.    She believes her osteoporosis is due to early menopause (age 49). She was also a vegetarian her whole life and feels she may have been deficient in vitamins/minerals. She has no hx of prolonged steroid use. She does have a family history of osteoporosis in her mother.     Her last DEXA scan was 2 years ago, but she cannot remember the results.    She has received numerous treatments but cannot recall the exact timeline. Past treatments included:  Fosamax for "as long as I was allowed"  Actonel (had worsening compression fx so it was stopped)  Prolia  Evenity (she states this worked well)  Forteo (she states this worked well)  Reclast x1 8/2022 (suffered from worsening joint pain after the injection and did not schedule her dose in 2023)      PAST MEDICAL & SURGICAL HISTORY:  Osteoporosis      IBS (irritable bowel syndrome)      No significant past surgical history          FAMILY HISTORY:  No pertinent family history in first degree relatives        Social History:    Outpatient Medications:    MEDICATIONS  (STANDING):  heparin   Injectable 5000 Unit(s) SubCutaneous every 12 hours  influenza  Vaccine (HIGH DOSE) 0.5 milliLiter(s) IntraMuscular once  lidocaine   4% Patch 1 Patch Transdermal daily  pancrelipase  (CREON  6,000 Lipase Units) 1 Capsule(s) Oral three times a day with meals  pregabalin 25 milliGRAM(s) Oral at bedtime  senna 2 Tablet(s) Oral at bedtime    MEDICATIONS  (PRN):  acetaminophen     Tablet .. 650 milliGRAM(s) Oral every 6 hours PRN Temp greater or equal to 38C (100.4F), Mild Pain (1 - 3)  aluminum hydroxide/magnesium hydroxide/simethicone Suspension 30 milliLiter(s) Oral every 4 hours PRN Dyspepsia  clonazePAM  Tablet 0.5 milliGRAM(s) Oral two times a day PRN anxiety  HYDROmorphone   Tablet 0.5 milliGRAM(s) Oral every 8 hours PRN moderate to severe pain (4-10)  HYDROmorphone  Injectable 0.2 milliGRAM(s) IV Push every 8 hours PRN severe breakthrough pain/prior to imaging  melatonin 3 milliGRAM(s) Oral at bedtime PRN Insomnia  ondansetron Injectable 4 milliGRAM(s) IV Push every 8 hours PRN Nausea and/or Vomiting  polyethylene glycol 3350 17 Gram(s) Oral daily PRN Constipation      Allergies    Actinel (Muscle Pain)  Lexapro (Other)    Intolerances      Review of Systems:  Constitutional: No fever  Eyes: No blurry vision  Neuro: No tremors  HEENT: + back pain  Cardiovascular: No chest pain, palpitations  Respiratory: No SOB, no cough  GI: No nausea, vomiting, abdominal pain  : No dysuria  Skin: no rash  Psych: no depression  Endocrine: no polyuria, polydipsia  Hem/lymph: no swelling  Osteoporosis: + fractures    ALL OTHER SYSTEMS REVIEWED AND NEGATIVE    PHYSICAL EXAM:  VITALS: T(C): 36.7 (10-01-24 @ 12:05)  T(F): 98 (10-01-24 @ 12:05), Max: 98.6 (09-30-24 @ 20:53)  HR: 84 (10-01-24 @ 12:05) (70 - 84)  BP: 112/73 (10-01-24 @ 12:05) (109/67 - 116/81)  RR:  (18 - 18)  SpO2:  (95% - 98%)  Wt(kg): --  GENERAL: NAD  EYES: No proptosis, no lid lag, anicteric  HEENT:  Atraumatic, Normocephalic, moist mucous membranes  RESPIRATORY: Normal respiratory effort; no audible wheezing  SKIN: Dry, intact, No rashes or lesions  NEURO: sensation intact, extraocular movements intact, no tremor  PSYCH: Alert and oriented x 3, normal affect, normal mood  CUSHING'S SIGNS: no striae      CAPILLARY BLOOD GLUCOSE                                10.6   6.15  )-----------( 449      ( 01 Oct 2024 06:15 )             32.7       10-01    132[L]  |  96[L]  |  15  ----------------------------<  89  3.7   |  24  |  0.43[L]    eGFR: 107    Ca    9.1      10-01  Mg     2.00     10-01  Phos  3.0     10-01    TPro  7.8  /  Alb  3.7  /  TBili  <0.2  /  DBili  x   /  AST  22  /  ALT  10  /  AlkPhos  58  09-29      Thyroid Function Tests:  09-29 @ 09:18 TSH 1.79 FreeT4 -- T3 -- Anti TPO -- Anti Thyroglobulin Ab -- TSI --              Radiology:                HPI:  This is a 67 y/o F with pmhx of chronic back pain, anxiety, depression, OA, IBS hx of suicide attempt presented to the ED for new onset suicidal ideations. The patient states that she has chronic back pain and that she wants to end it all. Hx of overdosing on medications in the past in a suicide attempt. Today she can no longer tolerate the pain and wants to "end it all". Pain described as burning pain along the spinal area. No clear plan for SI. The patient's chronic back pain started last year. She cannot lay flat for CT scans because of kyphosis. She also has IBS with diarrhea in the AM which improves. No blood in stool. ROS otherwise negative. Denies LE weakness, denies stool or urinary incontinence. Currently denies SI at bedside. (29 Sep 2024 23:25)      ENDOCRINE HX:    History obtained from patient and  at bedside. Attempted to call patient's rheumatologist Dr. Melo ([phone]) w/ no response, mailbox full.    Patient initially diagnosed in 2017 w/ osteoporosis after she had a "foot fracture."   She also has a hx of a right hip fx s/p pin in 6/2024 and multiple spinal compression fractures.    She believes her osteoporosis is due to early menopause (age 49). She was also a vegetarian her whole life and feels she may have been deficient in vitamins/minerals. She has no hx of prolonged steroid use. She does have a family history of osteoporosis in her mother.     Her last DEXA scan was 2 years ago, but she cannot remember the results.    She has received numerous treatments but cannot recall the exact timeline. Past treatments included:  Fosamax for "as long as I was allowed"  Actonel (had worsening compression fx so it was stopped)  Prolia  Evenity (she states this worked well)  Forteo (she states this worked well)  Reclast x1 8/2022 (suffered from worsening joint pain after the injection and did not schedule her dose in 2023)    Takes calcium and vitamin D supplements    Calcium 9.1  TSH 1.79  Vitamin D 43 in 2023      PAST MEDICAL & SURGICAL HISTORY:  Osteoporosis      IBS (irritable bowel syndrome)      No significant past surgical history          FAMILY HISTORY:  No pertinent family history in first degree relatives        Social History:    Outpatient Medications:    MEDICATIONS  (STANDING):  heparin   Injectable 5000 Unit(s) SubCutaneous every 12 hours  influenza  Vaccine (HIGH DOSE) 0.5 milliLiter(s) IntraMuscular once  lidocaine   4% Patch 1 Patch Transdermal daily  pancrelipase  (CREON  6,000 Lipase Units) 1 Capsule(s) Oral three times a day with meals  pregabalin 25 milliGRAM(s) Oral at bedtime  senna 2 Tablet(s) Oral at bedtime    MEDICATIONS  (PRN):  acetaminophen     Tablet .. 650 milliGRAM(s) Oral every 6 hours PRN Temp greater or equal to 38C (100.4F), Mild Pain (1 - 3)  aluminum hydroxide/magnesium hydroxide/simethicone Suspension 30 milliLiter(s) Oral every 4 hours PRN Dyspepsia  clonazePAM  Tablet 0.5 milliGRAM(s) Oral two times a day PRN anxiety  HYDROmorphone   Tablet 0.5 milliGRAM(s) Oral every 8 hours PRN moderate to severe pain (4-10)  HYDROmorphone  Injectable 0.2 milliGRAM(s) IV Push every 8 hours PRN severe breakthrough pain/prior to imaging  melatonin 3 milliGRAM(s) Oral at bedtime PRN Insomnia  ondansetron Injectable 4 milliGRAM(s) IV Push every 8 hours PRN Nausea and/or Vomiting  polyethylene glycol 3350 17 Gram(s) Oral daily PRN Constipation      Allergies    Actinel (Muscle Pain)  Lexapro (Other)    Intolerances      Review of Systems:  Constitutional: No fever  Eyes: No blurry vision  Neuro: No tremors  HEENT: + back pain  Cardiovascular: No chest pain, palpitations  Respiratory: No SOB, no cough  GI: No nausea, vomiting, abdominal pain  : No dysuria  Skin: no rash  Psych: no depression  Endocrine: no polyuria, polydipsia  Hem/lymph: no swelling  Osteoporosis: + fractures    ALL OTHER SYSTEMS REVIEWED AND NEGATIVE    PHYSICAL EXAM:  VITALS: T(C): 36.7 (10-01-24 @ 12:05)  T(F): 98 (10-01-24 @ 12:05), Max: 98.6 (09-30-24 @ 20:53)  HR: 84 (10-01-24 @ 12:05) (70 - 84)  BP: 112/73 (10-01-24 @ 12:05) (109/67 - 116/81)  RR:  (18 - 18)  SpO2:  (95% - 98%)  Wt(kg): --  GENERAL: NAD  EYES: No proptosis, no lid lag, anicteric  HEENT:  Atraumatic, Normocephalic, moist mucous membranes  RESPIRATORY: Normal respiratory effort; no audible wheezing  SKIN: Dry, intact, No rashes or lesions  NEURO: sensation intact, extraocular movements intact, no tremor  PSYCH: Alert and oriented x 3, normal affect, normal mood  CUSHING'S SIGNS: no striae      CAPILLARY BLOOD GLUCOSE                                10.6   6.15  )-----------( 449      ( 01 Oct 2024 06:15 )             32.7       10-01    132[L]  |  96[L]  |  15  ----------------------------<  89  3.7   |  24  |  0.43[L]    eGFR: 107    Ca    9.1      10-01  Mg     2.00     10-01  Phos  3.0     10-01    TPro  7.8  /  Alb  3.7  /  TBili  <0.2  /  DBili  x   /  AST  22  /  ALT  10  /  AlkPhos  58  09-29      Thyroid Function Tests:  09-29 @ 09:18 TSH 1.79 FreeT4 -- T3 -- Anti TPO -- Anti Thyroglobulin Ab -- TSI --              Radiology:

## 2024-10-01 NOTE — DIETITIAN INITIAL EVALUATION ADULT - ORAL INTAKE PTA/DIET HISTORY
Pt avoids Meats (Chicken, Beef, Pork, Lamb)  No dairy accept butter & Cheese (Swiss or cheddar); [place] milk is okay  No orange Juice, No Caffeine  No spicy food, no spices, no high fiber food/food options; no oat meals  Pt accepts Fish, Scrambled eggs, Cream of rice but no hard boiled eggs

## 2024-10-01 NOTE — PROGRESS NOTE ADULT - SUBJECTIVE AND OBJECTIVE BOX
Patient is a 68y old  Female who presents with a chief complaint of chronic back pain, SI (01 Oct 2024 15:32)      SUBJECTIVE / OVERNIGHT EVENTS: Pt seen and examined at 11am, no overnight events, pt reports that she did not tolerate dilaudid, says that she was not able to sleep and felt weird, pain is somewhat better and wants to leave the hospital today, refused xrays as well,  Miles at bedside, no other new issues reported.  Later said that she will try dilaudid again and will stay in the hospital.      MEDICATIONS  (STANDING):  heparin   Injectable 5000 Unit(s) SubCutaneous every 12 hours  influenza  Vaccine (HIGH DOSE) 0.5 milliLiter(s) IntraMuscular once  lidocaine   4% Patch 1 Patch Transdermal daily  pancrelipase  (CREON  6,000 Lipase Units) 1 Capsule(s) Oral three times a day with meals  pregabalin 25 milliGRAM(s) Oral at bedtime  senna 2 Tablet(s) Oral at bedtime    MEDICATIONS  (PRN):  acetaminophen     Tablet .. 650 milliGRAM(s) Oral every 6 hours PRN Temp greater or equal to 38C (100.4F), Mild Pain (1 - 3)  aluminum hydroxide/magnesium hydroxide/simethicone Suspension 30 milliLiter(s) Oral every 4 hours PRN Dyspepsia  clonazePAM  Tablet 0.5 milliGRAM(s) Oral two times a day PRN anxiety  HYDROmorphone   Tablet 0.5 milliGRAM(s) Oral every 8 hours PRN moderate to severe pain (4-10)  HYDROmorphone  Injectable 0.2 milliGRAM(s) IV Push every 8 hours PRN severe breakthrough pain/prior to imaging  melatonin 3 milliGRAM(s) Oral at bedtime PRN Insomnia  ondansetron Injectable 4 milliGRAM(s) IV Push every 8 hours PRN Nausea and/or Vomiting  polyethylene glycol 3350 17 Gram(s) Oral daily PRN Constipation      Vital Signs Last 24 Hrs  T(C): 36.7 (01 Oct 2024 12:05), Max: 37 (30 Sep 2024 20:53)  T(F): 98 (01 Oct 2024 12:05), Max: 98.6 (30 Sep 2024 20:53)  HR: 84 (01 Oct 2024 12:05) (70 - 84)  BP: 112/73 (01 Oct 2024 12:05) (109/67 - 116/81)  BP(mean): --  RR: 18 (01 Oct 2024 12:05) (18 - 18)  SpO2: 95% (01 Oct 2024 12:05) (95% - 98%)    Parameters below as of 01 Oct 2024 12:05  Patient On (Oxygen Delivery Method): room air      CAPILLARY BLOOD GLUCOSE        I&O's Summary      PHYSICAL EXAM:  GENERAL: NAD, frail appearing female  CHEST/LUNG: Clear to auscultation bilaterally; No wheeze  HEART: Regular rate and rhythm  ABDOMEN: Soft, Nontender, mildly distended  EXTREMITIES: no LE edema  PSYCH: Calm  NEUROLOGY: AA answers questions appropriately  SKIN: dry and warm    LABS:                        10.6   6.15  )-----------( 449      ( 01 Oct 2024 06:15 )             32.7     10-01    132[L]  |  96[L]  |  15  ----------------------------<  89  3.7   |  24  |  0.43[L]    Ca    9.1      01 Oct 2024 06:15  Phos  3.0     10-01  Mg     2.00     10-01            Urinalysis Basic - ( 01 Oct 2024 06:15 )    Color: x / Appearance: x / SG: x / pH: x  Gluc: 89 mg/dL / Ketone: x  / Bili: x / Urobili: x   Blood: x / Protein: x / Nitrite: x   Leuk Esterase: x / RBC: x / WBC x   Sq Epi: x / Non Sq Epi: x / Bacteria: x        RADIOLOGY & ADDITIONAL TESTS:    Imaging Personally Reviewed:    Consultant(s) Notes Reviewed:      Care Discussed with Consultants/Other Providers:

## 2024-10-01 NOTE — DIETITIAN INITIAL EVALUATION ADULT - PERTINENT LABORATORY DATA
10-01    132[L]  |  96[L]  |  15  ----------------------------<  89  3.7   |  24  |  0.43[L]    Ca    9.1      01 Oct 2024 06:15  Phos  3.0     10-01  Mg     2.00     10-01

## 2024-10-01 NOTE — PROGRESS NOTE ADULT - PROBLEM SELECTOR PLAN 3
- patient with chronic back pain, poor follow up outpatient  - pain management consult appreciated, started on po dilaudid 0.5mg q8h prn, dilaudid 0.2mg iv for breakthrough pain q8hprn  - patient cannot lay flat for CT scan or MRI studies, refused c/t/l spine xrays as well  - cont lidocaine patch  - cont lyrica 25mg QD for neuropathic pain  - PT eval- no skilled PT needs  - pt with h/o Osteroporosis, has tried meds in the past which did not improve her symptoms, will consult endocrine

## 2024-10-01 NOTE — CONSULT NOTE ADULT - ATTENDING COMMENTS
68F severe osteoporosis complicated by vertebral compression fractures, R hip fracture now with suicidal ideation/ attempt related to severe back pain. Patient crying at time of visit - please readdress pain management.  Will obtain collateral from outpatient doctor managing osteoporosis to clarify prior treatment durations, and last DXA results.  Resume home calcium, D supplements.    Joanne Chapa MD  Division of Endocrinology  Pager: [phone]    If after 6PM or before 9AM, or on weekends/holidays, please call endocrine answering service for assistance ([phone]).  For nonurgent matters email [email or web address] for assistance.

## 2024-10-01 NOTE — CONSULT NOTE ADULT - ASSESSMENT
67 y/o F with pmhx of chronic back pain, anxiety, depression, OA, IBS hx of suicide attempt presented to the ED for new onset suicidal ideations. Admitted for psych eval and pain management of chronic back pain. Endocrinology consulted for osteoporosis.    #Osteoporosis  - Follows with rheumatologist Dr. Melo ([phone]), unable to reach today  - Patient initially diagnosed in 2017 w/ osteoporosis after she had a "foot fracture."   - Also hx of a right hip fx s/p pin in 6/2024 and multiple spinal compression fractures  - Possibly 2/2 early menopause and limited/vegetarian diet per patient  - Last DEXA 2 years ago, cannot recall specirfic results  - past treatments include Fosamax for "as long as I was allowed", Actonel (had worsening compression fx so it was stopped), Prolia, Evenity (she states this worked well), Forteo (she states this worked well), and Reclast x1 8/2022 (suffered from worsening joint pain after the injection and did not schedule her dose in 2023)  - Calcium 9.1; TSH 1.79    PLAN  - will need to contact patients rheumatologist for more collateral  - will need DEXA results and treatment history/timeline  - check vitamin D and PTH  - restart home calcium and vitamin D supplements  - patient declines another zoledronic acid treatment this admission because of joint pain with past infusion  - could consider Prolia pending above workup, but likely would not be available on an inpatient basis   69 y/o F with pmhx of chronic back pain, anxiety, depression, OA, IBS hx of suicide attempt presented to the ED for new onset suicidal ideations. Admitted for psych eval and pain management of chronic back pain. Endocrinology consulted for osteoporosis.    #Osteoporosis  - Follows with rheumatologist Dr. Melo ([phone]), unable to reach today  - Patient initially diagnosed in 2017 w/ osteoporosis after she had a "foot fracture."   - Also hx of a right hip fx s/p pin in 6/2024 and multiple spinal compression fractures  - Possibly 2/2 early menopause and limited/vegetarian diet per patient  - Last DEXA 2 years ago, cannot recall specirfic results  - past treatments include Fosamax for "as long as I was allowed", Actonel (had worsening compression fx so it was stopped), Prolia, Evenity (she states this worked well), Forteo (she states this worked well), and Reclast x1 8/2022 (suffered from worsening joint pain after the injection and did not schedule her dose in 2023)  - Calcium 9.1; TSH 1.79    PLAN  - will need to contact patients rheumatologist for more collateral  - will need to obtain DEXA results and treatment history/timeline  - check vitamin D and PTH  - restart home calcium and vitamin D supplements  - patient declines another zoledronic acid treatment this admission because of joint pain with past infusion  - could consider Prolia pending above workup, but likely would not be available on an inpatient basis    D/w primary team    Kal Telles MD  Endocrine Fellow  For nonurgent matters, please email [email or web address] or [email or web address]. For urgent matters only, please call answering service at [phone] (weekdays), [phone] (nights/weekends).    67 y/o F with pmhx of chronic back pain, anxiety, depression, OA, IBS hx of suicide attempt presented to the ED for new onset suicidal ideations. Admitted for psych eval and pain management of chronic back pain. Endocrinology consulted for osteoporosis.    #Osteoporosis  - Follows with rheumatologist Dr. Melo ([phone]), unable to reach today  - Patient initially diagnosed in 2017 w/ osteoporosis after she had a "foot fracture."   - Also hx of a right hip fx s/p pin in 6/2024 and multiple spinal compression fractures  - Possibly 2/2 early menopause and limited/vegetarian diet per patient  - Last DEXA 2 years ago, cannot recall specific results  - past treatments include Fosamax for "as long as I was allowed", Actonel (had worsening compression fx so it was stopped), Prolia, Evenity (she states this worked well), Forteo (she states this worked well), and Reclast x1 8/2022 (suffered from worsening joint pain after the injection and did not schedule her dose in 2023)  - Calcium 9.1; TSH 1.79    PLAN  - will need to contact patients rheumatologist for more collateral  - will need to obtain DEXA results and treatment history/timeline  - check 25OH vitamin D and PTH  - restart home calcium and vitamin D supplements  - patient declines another zoledronic acid treatment this admission because of joint pain with past infusion  - could consider Prolia pending above workup, but likely would not be available on an inpatient basis    D/w primary team    Kal Telles MD  Endocrine Fellow  For nonurgent matters, please email [email or web address] or [email or web address]. For urgent matters only, please call answering service at [phone] (weekdays), [phone] (nights/weekends).

## 2024-10-01 NOTE — DIETITIAN INITIAL EVALUATION ADULT - OTHER INFO
Pt 67 yo female with PMHx of Irritable bowel syndrome, osteoporosis with severe kyphosis, depression with hx suicide attempts presenting s/p suicide attempt 2 nights ago - per chart review. Pt states that she has no quality of life anymore secondary to her IBS diarrhea and severe chronic back pain.    At time of visit, Pt awake, alert but weak; Pt's spouse at bed side. Pt's appetite not well reported. Food preferences discussed. No report of chewing or swallowing difficulty; no report of nausea, vomiting @ this time. Pt with persistent diarrhea reported. Pt stated, "I have been dealing with it for past 7 years."     Pt's height: 58" (9/29/24). Pt's body weight: 65#, stable, per Pt. Pt appears cachectic, under weight. Nutrition focused physical exam conducted, Pt found with signs of SEVERE subcutaneous fat loss & SEVERE muscle wasting. RD offered variety of PO supplements, but Pt declined them all. Case discussed with ACP/provider, nurse.

## 2024-10-02 DIAGNOSIS — E55.9 VITAMIN D DEFICIENCY, UNSPECIFIED: ICD-10-CM

## 2024-10-02 LAB
24R-OH-CALCIDIOL SERPL-MCNC: 28 NG/ML — LOW (ref 30–80)
ANION GAP SERPL CALC-SCNC: 12 MMOL/L — SIGNIFICANT CHANGE UP (ref 7–14)
BUN SERPL-MCNC: 15 MG/DL — SIGNIFICANT CHANGE UP (ref 7–23)
CALCIUM SERPL-MCNC: 9 MG/DL — SIGNIFICANT CHANGE UP (ref 8.4–10.5)
CHLORIDE SERPL-SCNC: 96 MMOL/L — LOW (ref 98–107)
CO2 SERPL-SCNC: 24 MMOL/L — SIGNIFICANT CHANGE UP (ref 22–31)
CREAT SERPL-MCNC: 0.31 MG/DL — LOW (ref 0.5–1.3)
EGFR: 115 ML/MIN/1.73M2 — SIGNIFICANT CHANGE UP
GLUCOSE SERPL-MCNC: 87 MG/DL — SIGNIFICANT CHANGE UP (ref 70–99)
HCT VFR BLD CALC: 30.6 % — LOW (ref 34.5–45)
HGB BLD-MCNC: 10 G/DL — LOW (ref 11.5–15.5)
MAGNESIUM SERPL-MCNC: 1.8 MG/DL — SIGNIFICANT CHANGE UP (ref 1.6–2.6)
MCHC RBC-ENTMCNC: 25.4 PG — LOW (ref 27–34)
MCHC RBC-ENTMCNC: 32.7 GM/DL — SIGNIFICANT CHANGE UP (ref 32–36)
MCV RBC AUTO: 77.9 FL — LOW (ref 80–100)
NRBC # BLD: 0 /100 WBCS — SIGNIFICANT CHANGE UP (ref 0–0)
NRBC # FLD: 0 K/UL — SIGNIFICANT CHANGE UP (ref 0–0)
PHOSPHATE SERPL-MCNC: 2.9 MG/DL — SIGNIFICANT CHANGE UP (ref 2.5–4.5)
PLATELET # BLD AUTO: 464 K/UL — HIGH (ref 150–400)
POTASSIUM SERPL-MCNC: 4 MMOL/L — SIGNIFICANT CHANGE UP (ref 3.5–5.3)
POTASSIUM SERPL-SCNC: 4 MMOL/L — SIGNIFICANT CHANGE UP (ref 3.5–5.3)
PTH-INTACT FLD-MCNC: 32 PG/ML — SIGNIFICANT CHANGE UP (ref 15–65)
RBC # BLD: 3.93 M/UL — SIGNIFICANT CHANGE UP (ref 3.8–5.2)
RBC # FLD: 16.5 % — HIGH (ref 10.3–14.5)
SODIUM SERPL-SCNC: 132 MMOL/L — LOW (ref 135–145)
WBC # BLD: 7.1 K/UL — SIGNIFICANT CHANGE UP (ref 3.8–10.5)
WBC # FLD AUTO: 7.1 K/UL — SIGNIFICANT CHANGE UP (ref 3.8–10.5)

## 2024-10-02 PROCEDURE — 99232 SBSQ HOSP IP/OBS MODERATE 35: CPT

## 2024-10-02 PROCEDURE — 99233 SBSQ HOSP IP/OBS HIGH 50: CPT

## 2024-10-02 RX ORDER — ANTACID TABLETS 500 MG/1
1 TABLET, CHEWABLE ORAL
Refills: 0 | Status: DISCONTINUED | OUTPATIENT
Start: 2024-10-02 | End: 2024-10-08

## 2024-10-02 RX ORDER — HYDROMORPHONE HYDROCHLORIDE 1 MG/ML
1 INJECTION, SOLUTION INTRAMUSCULAR; INTRAVENOUS; SUBCUTANEOUS EVERY 8 HOURS
Refills: 0 | Status: DISCONTINUED | OUTPATIENT
Start: 2024-10-02 | End: 2024-10-03

## 2024-10-02 RX ORDER — HYDROMORPHONE HYDROCHLORIDE 1 MG/ML
0.2 INJECTION, SOLUTION INTRAMUSCULAR; INTRAVENOUS; SUBCUTANEOUS ONCE
Refills: 0 | Status: DISCONTINUED | OUTPATIENT
Start: 2024-10-02 | End: 2024-10-02

## 2024-10-02 RX ORDER — HYDROMORPHONE HYDROCHLORIDE 1 MG/ML
0.75 INJECTION, SOLUTION INTRAMUSCULAR; INTRAVENOUS; SUBCUTANEOUS EVERY 8 HOURS
Refills: 0 | Status: DISCONTINUED | OUTPATIENT
Start: 2024-10-02 | End: 2024-10-02

## 2024-10-02 RX ADMIN — HYDROMORPHONE HYDROCHLORIDE 1 MILLIGRAM(S): 1 INJECTION, SOLUTION INTRAMUSCULAR; INTRAVENOUS; SUBCUTANEOUS at 23:47

## 2024-10-02 RX ADMIN — PANCRELIPASE 1 CAPSULE(S): 8000; 30250; 28750 CAPSULE, DELAYED RELEASE ORAL at 13:01

## 2024-10-02 RX ADMIN — HYDROMORPHONE HYDROCHLORIDE 1 MILLIGRAM(S): 1 INJECTION, SOLUTION INTRAMUSCULAR; INTRAVENOUS; SUBCUTANEOUS at 23:17

## 2024-10-02 RX ADMIN — DIAZEPAM 5 MILLIGRAM(S): 10 TABLET ORAL at 08:44

## 2024-10-02 RX ADMIN — Medication 325 MILLIGRAM(S): at 13:02

## 2024-10-02 RX ADMIN — PANCRELIPASE 1 CAPSULE(S): 8000; 30250; 28750 CAPSULE, DELAYED RELEASE ORAL at 09:19

## 2024-10-02 RX ADMIN — Medication 1000 UNIT(S): at 13:02

## 2024-10-02 RX ADMIN — DIAZEPAM 5 MILLIGRAM(S): 10 TABLET ORAL at 04:16

## 2024-10-02 RX ADMIN — PANCRELIPASE 1 CAPSULE(S): 8000; 30250; 28750 CAPSULE, DELAYED RELEASE ORAL at 18:11

## 2024-10-02 NOTE — PROGRESS NOTE ADULT - SUBJECTIVE AND OBJECTIVE BOX
ENDOCRINE FOLLOW UP     Chief Complaint: Osteoporosis    History: Patient seen at bedside. Feeling a bit better today. Still complaining of back pain.    MEDICATIONS  (STANDING):  calcium carbonate    500 mG (Tums) Chewable 1 Tablet(s) Chew two times a day  cholecalciferol 1000 Unit(s) Oral daily  ferrous    sulfate 325 milliGRAM(s) Oral daily  heparin   Injectable 5000 Unit(s) SubCutaneous every 12 hours  influenza  Vaccine (HIGH DOSE) 0.5 milliLiter(s) IntraMuscular once  lidocaine   4% Patch 1 Patch Transdermal daily  pancrelipase  (CREON  6,000 Lipase Units) 1 Capsule(s) Oral three times a day with meals  pregabalin 25 milliGRAM(s) Oral at bedtime  senna 2 Tablet(s) Oral at bedtime    MEDICATIONS  (PRN):  acetaminophen     Tablet .. 650 milliGRAM(s) Oral every 6 hours PRN Temp greater or equal to 38C (100.4F), Mild Pain (1 - 3)  aluminum hydroxide/magnesium hydroxide/simethicone Suspension 30 milliLiter(s) Oral every 4 hours PRN Dyspepsia  diazepam    Tablet 5 milliGRAM(s) Oral two times a day PRN anxiety  HYDROmorphone   Tablet 0.5 milliGRAM(s) Oral every 8 hours PRN moderate to severe pain (4-10)  HYDROmorphone  Injectable 0.2 milliGRAM(s) IV Push every 8 hours PRN severe breakthrough pain/prior to imaging  melatonin 3 milliGRAM(s) Oral at bedtime PRN Insomnia  ondansetron Injectable 4 milliGRAM(s) IV Push every 8 hours PRN Nausea and/or Vomiting  polyethylene glycol 3350 17 Gram(s) Oral daily PRN Constipation      Allergies    Actinel (Muscle Pain)  Lexapro (Other)    Intolerances        ROS: All other systems reviewed and negative    PHYSICAL EXAM:  VITALS: T(C): 36.4 (10-02-24 @ 04:18)  T(F): 97.5 (10-02-24 @ 04:18), Max: 97.8 (10-01-24 @ 16:05)  HR: 66 (10-02-24 @ 04:18) (66 - 83)  BP: 102/67 (10-02-24 @ 04:18) (102/67 - 115/73)  RR:  (18 - 18)  SpO2:  (96% - 98%)  Wt(kg): --  GENERAL: NAD, resting comfortably   EYES: No proptosis,  anicteric  HEENT:  Atraumatic, Normocephalic, moist mucous membranes  RESPIRATORY: Nonlabored respirations on room air, normal rate/effort   NEURO: AOx3, moves all extremities spontaenuously   PSYCH:  reactive affect, euthymic mood        10-02    132[L]  |  96[L]  |  15  ----------------------------<  87  4.0   |  24  |  0.31[L]    eGFR: 115    Ca    9.0      10-02  Mg     1.80     10-02  Phos  2.9     10-02            Thyroid Stimulating Hormone, Serum: 1.79 uIU/mL (09-29-24 @ 09:18)   ENDOCRINE FOLLOW UP     Chief Complaint: Osteoporosis    History: Patient seen at bedside. Feeling a bit better today. Still complaining of back pain. Additional collateral obtained from patients rheumatology clinic in A/P below.    MEDICATIONS  (STANDING):  calcium carbonate    500 mG (Tums) Chewable 1 Tablet(s) Chew two times a day  cholecalciferol 1000 Unit(s) Oral daily  ferrous    sulfate 325 milliGRAM(s) Oral daily  heparin   Injectable 5000 Unit(s) SubCutaneous every 12 hours  influenza  Vaccine (HIGH DOSE) 0.5 milliLiter(s) IntraMuscular once  lidocaine   4% Patch 1 Patch Transdermal daily  pancrelipase  (CREON  6,000 Lipase Units) 1 Capsule(s) Oral three times a day with meals  pregabalin 25 milliGRAM(s) Oral at bedtime  senna 2 Tablet(s) Oral at bedtime    MEDICATIONS  (PRN):  acetaminophen     Tablet .. 650 milliGRAM(s) Oral every 6 hours PRN Temp greater or equal to 38C (100.4F), Mild Pain (1 - 3)  aluminum hydroxide/magnesium hydroxide/simethicone Suspension 30 milliLiter(s) Oral every 4 hours PRN Dyspepsia  diazepam    Tablet 5 milliGRAM(s) Oral two times a day PRN anxiety  HYDROmorphone   Tablet 0.5 milliGRAM(s) Oral every 8 hours PRN moderate to severe pain (4-10)  HYDROmorphone  Injectable 0.2 milliGRAM(s) IV Push every 8 hours PRN severe breakthrough pain/prior to imaging  melatonin 3 milliGRAM(s) Oral at bedtime PRN Insomnia  ondansetron Injectable 4 milliGRAM(s) IV Push every 8 hours PRN Nausea and/or Vomiting  polyethylene glycol 3350 17 Gram(s) Oral daily PRN Constipation      Allergies    Actinel (Muscle Pain)  Lexapro (Other)    Intolerances        ROS: All other systems reviewed and negative    PHYSICAL EXAM:  VITALS: T(C): 36.4 (10-02-24 @ 04:18)  T(F): 97.5 (10-02-24 @ 04:18), Max: 97.8 (10-01-24 @ 16:05)  HR: 66 (10-02-24 @ 04:18) (66 - 83)  BP: 102/67 (10-02-24 @ 04:18) (102/67 - 115/73)  RR:  (18 - 18)  SpO2:  (96% - 98%)  Wt(kg): --  GENERAL: NAD, resting comfortably   EYES: No proptosis,  anicteric  HEENT:  Atraumatic, Normocephalic, moist mucous membranes  RESPIRATORY: Nonlabored respirations on room air, normal rate/effort   NEURO: AOx3, moves all extremities spontaenuously   PSYCH:  reactive affect, euthymic mood        10-02    132[L]  |  96[L]  |  15  ----------------------------<  87  4.0   |  24  |  0.31[L]    eGFR: 115    Ca    9.0      10-02  Mg     1.80     10-02  Phos  2.9     10-02            Thyroid Stimulating Hormone, Serum: 1.79 uIU/mL (09-29-24 @ 09:18)

## 2024-10-02 NOTE — PROGRESS NOTE ADULT - PROBLEM SELECTOR PLAN 3
- patient with chronic back pain, poor follow up outpatient  - pain management consult appreciated, started on po dilaudid 0.5mg q8h prn, dilaudid 0.2mg iv for breakthrough pain q8hprn  - patient cannot lay flat for CT scan or MRI studies, refused c/t/l spine xrays as well  - cont lidocaine patch  - cont lyrica 25mg QD for neuropathic pain  - PT eval- no skilled PT needs  - pt with h/o Osteroporosis, has tried meds in the past which did not improve her symptoms, endocrine consuted  started on vit d and ca  - vit d level low

## 2024-10-02 NOTE — PROGRESS NOTE ADULT - ATTENDING COMMENTS
69 y/o F with pmhx of chronic back pain, anxiety, depression, OA, IBS hx of suicide attempt presented to the ED for new onset suicidal ideations. Admitted for psych eval and pain management of chronic back pain. Endocrinology consulted for osteoporosis.    Patient with severe osteoporosis with multiple compression fx hx and low T-score on both spine and femoral neck. No evidence of acute fracture based on limited spine XR this admission. Pt would benefit from anabolic agent. Given severely low T score of hip, Evenity preferred over tymlos/forteo, though nonvertebral/vertebral benefit remains the same. Medication to be discussed and started as outpatient basis, along with DXA scan. Patient prefers to follow up with rheumatology clinic. No further inpatient workup needed at this time.

## 2024-10-02 NOTE — PROGRESS NOTE ADULT - ASSESSMENT
69 y/o F with pmhx of chronic back pain, anxiety, depression, OA, IBS hx of suicide attempt presented to the ED for new onset suicidal ideations. Admitted for psych eval and pain management of chronic back pain. Endocrinology consulted for osteoporosis.    #Osteoporosis  - Additional collateral obtained from REUBEN Boyer at rheumatology clinic ([phone])  - Patient initially diagnosed in 2017 w/ osteoporosis after she had a "foot fracture."   - Also hx of a right hip fx s/p pin in 6/2024 and multiple spinal compression fractures  - Possibly 2/2 early menopause and limited/vegetarian diet per patient  - Last DEXA 2020: T scores -4.5 in L3 and -4.5 in the right FN  - Treatment history includes Forteo (approximately 3286-2622 with good results), Fosamax (approximately 8164-9360), Evenity (approximately 8580-2331 with good results), and Reclast (8/2022, patient skipped subsequent dose due to worsening joint pain). Treatment history also includes very short trials of Prolia (stopped due to worsening joint pain) and Actonel (stopped due to fractures). Patient was lost to followup and has not been seen since 2022, was reportedly seeking palliative care options.  - Calcium 9.1; PTH 32; TSH 1.79; 25-OH vitamin D 28    PLAN  - continue home calcium and vitamin D supplements  - patient declines another zoledronic acid treatment this admission because of joint pain with past infusion  - patient would likely benefit from Forteo or Tymlos, but likely would not be available on an inpatient basis      Kal Telles MD  Endocrine Fellow  For nonurgent matters, please email [email or web address] or [email or web address]. For urgent matters only, please call answering service at [phone] (weekdays), [phone] (nights/weekends).    67 y/o F with pmhx of chronic back pain, anxiety, depression, OA, IBS hx of suicide attempt presented to the ED for new onset suicidal ideations. Admitted for psych eval and pain management of chronic back pain. Endocrinology consulted for osteoporosis.    #Osteoporosis  - Additional collateral obtained from REUBEN Boyer at rheumatology clinic ([phone])  - Patient initially diagnosed in 2017 w/ osteoporosis after she had a "foot fracture."   - Also hx of a right hip fx s/p pin in 6/2024 and multiple spinal compression fractures  - Possibly 2/2 early menopause and limited/vegetarian diet per patient  - Last DEXA 2020: T scores -4.5 in L3 and -4.5 in the right FN  - Treatment history includes Forteo (approximately 9925-0960 with good results), Fosamax (approximately 7458-6926), Evenity (approximately 2491-4632 with good results), and Reclast (8/2022, patient skipped subsequent dose due to worsening joint pain). Treatment history also includes very short trials of Prolia (stopped due to worsening joint pain) and Actonel (stopped due to fractures). Patient was lost to followup and has not been seen since 2022, was reportedly seeking palliative care options.  - Calcium 9.1; PTH 32; TSH 1.79; 25-OH vitamin D 28    PLAN  - continue home calcium and vitamin D supplements  - patient declines another zoledronic acid treatment this admission because of joint pain with past infusion  - patient would likely benefit most from an anabolic agent. Evenity may be preferred given low T-scores at both the spine and hip. Forteo or Tymlos would also benefit the patient at the spine. None of these medications are available on an inpatient basis.  - Patient should followup with her rheumatology clinic. If she desires to followup with [facility] she is welcome to make an appt at [facility]: [address] [place] [address]. [address]. [place] [address]. Tel: [phone]. Please include information in DC paperwork.    Endocrinology team will sign off at this time. Please feel free to reconsult as needed.     Kal Telles MD  Endocrine Fellow  For nonurgent matters, please email [email or web address] or [email or web address]. For urgent matters only, please call answering service at [phone] (weekdays), [phone] (nights/weekends).    67 y/o F with pmhx of chronic back pain, anxiety, depression, OA, IBS hx of suicide attempt presented to the ED for new onset suicidal ideations. Admitted for psych eval and pain management of chronic back pain. Endocrinology consulted for osteoporosis.    #Osteoporosis  - Additional collateral obtained from REUBEN Boyer at rheumatology clinic ([phone])  - Patient initially diagnosed in 2017 w/ osteoporosis after she had a "foot fracture."   - Also hx of a right hip fx s/p pin in 6/2024 and multiple spinal compression fractures  - Possibly 2/2 early menopause and limited/vegetarian diet per patient  - Last DEXA 2020: T scores -4.5 in L3 and -4.5 in the right FN  - Treatment history includes Forteo (approximately 8049-4982 with good results), Fosamax (approximately 9040-1350), Evenity (approximately 4261-5446 with good results), and Reclast (8/2022, patient skipped subsequent dose due to worsening joint pain). Treatment history also includes very short trials of Prolia (stopped due to worsening joint pain) and Actonel (stopped due to fractures). Patient was lost to followup and has not been seen since 2022, was reportedly seeking palliative care options.  - Calcium 9.1; PTH 32; TSH 1.79; 25-OH vitamin D 28    PLAN  - continue home calcium and vitamin D supplements  - patient declines another zoledronic acid treatment this admission because of joint pain with past infusion  - patient would likely benefit most from an anabolic agent. Evenity may be preferred given low T-scores at both the spine and hip. Forteo or Tymlos would also benefit the patient at the spine. None of these medications are available on an inpatient basis.  - Patient should followup with her rheumatology clinic. If she desires to followup with [facility] she is welcome to make an appt at [facility]: [address] [place] [address]. [address]. [place] [address]. Tel: [phone]. Please include information in DC paperwork.    Endocrinology team will sign off at this time. Please feel free to reconsult as needed.     D/w primary team    Kal Telles MD  Endocrine Fellow  For nonurgent matters, please email [email or web address] or [email or web address]. For urgent matters only, please call answering service at [phone] (weekdays), [phone] (nights/weekends).

## 2024-10-02 NOTE — PROGRESS NOTE ADULT - SUBJECTIVE AND OBJECTIVE BOX
Patient is a 68y old  Female who presents with a chief complaint of chronic back pain, SI (02 Oct 2024 12:28)      SUBJECTIVE / OVERNIGHT EVENTS: Pt seen and examined at 11am, no overnight events, pt still with back pain, asking for iv dilaudid, encouraged her to use po pain meds to assess for adjustment of her meds. No other new issues reported.        MEDICATIONS  (STANDING):  calcium carbonate    500 mG (Tums) Chewable 1 Tablet(s) Chew two times a day  cholecalciferol 1000 Unit(s) Oral daily  ferrous    sulfate 325 milliGRAM(s) Oral daily  heparin   Injectable 5000 Unit(s) SubCutaneous every 12 hours  influenza  Vaccine (HIGH DOSE) 0.5 milliLiter(s) IntraMuscular once  lidocaine   4% Patch 1 Patch Transdermal daily  pancrelipase  (CREON  6,000 Lipase Units) 1 Capsule(s) Oral three times a day with meals  pregabalin 25 milliGRAM(s) Oral at bedtime  senna 2 Tablet(s) Oral at bedtime    MEDICATIONS  (PRN):  acetaminophen     Tablet .. 650 milliGRAM(s) Oral every 6 hours PRN Temp greater or equal to 38C (100.4F), Mild Pain (1 - 3)  aluminum hydroxide/magnesium hydroxide/simethicone Suspension 30 milliLiter(s) Oral every 4 hours PRN Dyspepsia  diazepam    Tablet 5 milliGRAM(s) Oral two times a day PRN anxiety  HYDROmorphone   Tablet 0.5 milliGRAM(s) Oral every 8 hours PRN moderate to severe pain (4-10)  HYDROmorphone  Injectable 0.2 milliGRAM(s) IV Push every 8 hours PRN severe breakthrough pain/prior to imaging  melatonin 3 milliGRAM(s) Oral at bedtime PRN Insomnia  ondansetron Injectable 4 milliGRAM(s) IV Push every 8 hours PRN Nausea and/or Vomiting  polyethylene glycol 3350 17 Gram(s) Oral daily PRN Constipation      Vital Signs Last 24 Hrs  T(C): 36.5 (02 Oct 2024 13:14), Max: 36.6 (01 Oct 2024 16:05)  T(F): 97.7 (02 Oct 2024 13:14), Max: 97.8 (01 Oct 2024 16:05)  HR: 71 (02 Oct 2024 13:14) (66 - 83)  BP: 107/65 (02 Oct 2024 13:14) (102/67 - 115/73)  BP(mean): --  RR: 17 (02 Oct 2024 13:14) (17 - 18)  SpO2: 100% (02 Oct 2024 13:14) (96% - 100%)    Parameters below as of 02 Oct 2024 13:14  Patient On (Oxygen Delivery Method): room air      CAPILLARY BLOOD GLUCOSE        I&O's Summary      PHYSICAL EXAM:  GENERAL: NAD, frail appearing female  CHEST/LUNG: Clear to auscultation bilaterally; No wheeze  HEART: Regular rate and rhythm  ABDOMEN: Soft, Nontender, mildly distended  EXTREMITIES: no LE edema  PSYCH: Calm  NEUROLOGY: AA answers questions appropriately  SKIN: dry and warm      LABS:                        10.0   7.10  )-----------( 464      ( 02 Oct 2024 05:56 )             30.6     10-02    132[L]  |  96[L]  |  15  ----------------------------<  87  4.0   |  24  |  0.31[L]    Ca    9.0      02 Oct 2024 05:56  Phos  2.9     10-02  Mg     1.80     10-02            Urinalysis Basic - ( 02 Oct 2024 05:56 )    Color: x / Appearance: x / SG: x / pH: x  Gluc: 87 mg/dL / Ketone: x  / Bili: x / Urobili: x   Blood: x / Protein: x / Nitrite: x   Leuk Esterase: x / RBC: x / WBC x   Sq Epi: x / Non Sq Epi: x / Bacteria: x        RADIOLOGY & ADDITIONAL TESTS:    Imaging Personally Reviewed:    Consultant(s) Notes Reviewed:      Care Discussed with Consultants/Other Providers:

## 2024-10-02 NOTE — PROGRESS NOTE ADULT - ASSESSMENT
67 y/o F with pmhx of chronic back pain, anxiety, depression, OA, IBS hx of suicide attempt presented to the ED for new onset suicidal ideations. Admit for psych eval and pain management of chronic back pain.

## 2024-10-03 LAB
ANION GAP SERPL CALC-SCNC: 11 MMOL/L — SIGNIFICANT CHANGE UP (ref 7–14)
BUN SERPL-MCNC: 25 MG/DL — HIGH (ref 7–23)
CALCIUM SERPL-MCNC: 9.5 MG/DL — SIGNIFICANT CHANGE UP (ref 8.4–10.5)
CHLORIDE SERPL-SCNC: 97 MMOL/L — LOW (ref 98–107)
CO2 SERPL-SCNC: 27 MMOL/L — SIGNIFICANT CHANGE UP (ref 22–31)
CREAT SERPL-MCNC: 0.4 MG/DL — LOW (ref 0.5–1.3)
EGFR: 108 ML/MIN/1.73M2 — SIGNIFICANT CHANGE UP
GLUCOSE SERPL-MCNC: 77 MG/DL — SIGNIFICANT CHANGE UP (ref 70–99)
HCT VFR BLD CALC: 35.7 % — SIGNIFICANT CHANGE UP (ref 34.5–45)
HGB BLD-MCNC: 11.1 G/DL — LOW (ref 11.5–15.5)
MAGNESIUM SERPL-MCNC: 1.9 MG/DL — SIGNIFICANT CHANGE UP (ref 1.6–2.6)
MCHC RBC-ENTMCNC: 25.3 PG — LOW (ref 27–34)
MCHC RBC-ENTMCNC: 31.1 GM/DL — LOW (ref 32–36)
MCV RBC AUTO: 81.5 FL — SIGNIFICANT CHANGE UP (ref 80–100)
NRBC # BLD: 0 /100 WBCS — SIGNIFICANT CHANGE UP (ref 0–0)
NRBC # FLD: 0 K/UL — SIGNIFICANT CHANGE UP (ref 0–0)
PHOSPHATE SERPL-MCNC: 3.7 MG/DL — SIGNIFICANT CHANGE UP (ref 2.5–4.5)
PLATELET # BLD AUTO: 530 K/UL — HIGH (ref 150–400)
POTASSIUM SERPL-MCNC: 4 MMOL/L — SIGNIFICANT CHANGE UP (ref 3.5–5.3)
POTASSIUM SERPL-SCNC: 4 MMOL/L — SIGNIFICANT CHANGE UP (ref 3.5–5.3)
RBC # BLD: 4.38 M/UL — SIGNIFICANT CHANGE UP (ref 3.8–5.2)
RBC # FLD: 16.9 % — HIGH (ref 10.3–14.5)
SARS-COV-2 RNA SPEC QL NAA+PROBE: SIGNIFICANT CHANGE UP
SODIUM SERPL-SCNC: 135 MMOL/L — SIGNIFICANT CHANGE UP (ref 135–145)
WBC # BLD: 8.2 K/UL — SIGNIFICANT CHANGE UP (ref 3.8–10.5)
WBC # FLD AUTO: 8.2 K/UL — SIGNIFICANT CHANGE UP (ref 3.8–10.5)

## 2024-10-03 PROCEDURE — 99232 SBSQ HOSP IP/OBS MODERATE 35: CPT

## 2024-10-03 RX ORDER — PREGABALIN 25 MG/1
25 CAPSULE ORAL AT BEDTIME
Refills: 0 | Status: DISCONTINUED | OUTPATIENT
Start: 2024-10-03 | End: 2024-10-07

## 2024-10-03 RX ORDER — DIAZEPAM 10 MG/1
5 TABLET ORAL
Refills: 0 | Status: DISCONTINUED | OUTPATIENT
Start: 2024-10-03 | End: 2024-10-08

## 2024-10-03 RX ORDER — HYDROMORPHONE HYDROCHLORIDE 1 MG/ML
0.5 INJECTION, SOLUTION INTRAMUSCULAR; INTRAVENOUS; SUBCUTANEOUS EVERY 8 HOURS
Refills: 0 | Status: DISCONTINUED | OUTPATIENT
Start: 2024-10-03 | End: 2024-10-05

## 2024-10-03 RX ORDER — HYDROMORPHONE HYDROCHLORIDE 1 MG/ML
1 INJECTION, SOLUTION INTRAMUSCULAR; INTRAVENOUS; SUBCUTANEOUS EVERY 8 HOURS
Refills: 0 | Status: DISCONTINUED | OUTPATIENT
Start: 2024-10-03 | End: 2024-10-05

## 2024-10-03 RX ADMIN — ANTACID TABLETS 1 TABLET(S): 500 TABLET, CHEWABLE ORAL at 09:44

## 2024-10-03 RX ADMIN — PANCRELIPASE 1 CAPSULE(S): 8000; 30250; 28750 CAPSULE, DELAYED RELEASE ORAL at 09:28

## 2024-10-03 RX ADMIN — PANCRELIPASE 1 CAPSULE(S): 8000; 30250; 28750 CAPSULE, DELAYED RELEASE ORAL at 13:09

## 2024-10-03 RX ADMIN — PANCRELIPASE 1 CAPSULE(S): 8000; 30250; 28750 CAPSULE, DELAYED RELEASE ORAL at 18:49

## 2024-10-03 RX ADMIN — Medication 1000 UNIT(S): at 13:09

## 2024-10-03 RX ADMIN — Medication 2 TABLET(S): at 09:26

## 2024-10-03 NOTE — PROGRESS NOTE ADULT - PROBLEM SELECTOR PLAN 3
- patient with chronic back pain, poor follow up outpatient  - pain management consult appreciated, started on po dilaudid 0.5mg q8h prn, dilaudid 0.2mg iv for breakthrough pain q8hprn  - patient cannot lay flat for CT scan or MRI studies, refused c/t/l spine xrays as well  - cont lidocaine patch  - cont lyrica 25mg QD for neuropathic pain  - PT eval- no skilled PT needs  - pt with h/o Osteroporosis, has tried meds in the past which did not improve her symptoms, endocrine consulted  started on vit d and ca, Per endo Pt would benefit from anabolic agent. Given severely low T score of hip, Evenity preferred over tymlos/forteo, though nonvertebral/vertebral benefit remains the same. Medication to be discussed and started as outpatient basis, along with DXA scan. Patient prefers to follow up with rheumatology clinic.  - vit d level low

## 2024-10-03 NOTE — PROGRESS NOTE ADULT - SUBJECTIVE AND OBJECTIVE BOX
Patient is a 68y old  Female who presents with a chief complaint of chronic back pain, SI (02 Oct 2024 15:36)      SUBJECTIVE / OVERNIGHT EVENTS: Pt seen and examined at 11:15am, no overnight events, pt reports that back pain is under control with 1mg po dilaudid, reports that she did not have bm today, asking for simethicone and senna. Asking for shower. No other new issues reported.        MEDICATIONS  (STANDING):  calcium carbonate   1250 mG (OsCal) 1 Tablet(s) Oral two times a day  cholecalciferol 1000 Unit(s) Oral daily  ferrous    sulfate 325 milliGRAM(s) Oral daily  heparin   Injectable 5000 Unit(s) SubCutaneous every 12 hours  influenza  Vaccine (HIGH DOSE) 0.5 milliLiter(s) IntraMuscular once  lidocaine   4% Patch 1 Patch Transdermal daily  pancrelipase  (CREON  6,000 Lipase Units) 1 Capsule(s) Oral three times a day with meals  pregabalin 25 milliGRAM(s) Oral at bedtime  senna 2 Tablet(s) Oral at bedtime    MEDICATIONS  (PRN):  acetaminophen     Tablet .. 650 milliGRAM(s) Oral every 6 hours PRN Temp greater or equal to 38C (100.4F), Mild Pain (1 - 3)  aluminum hydroxide/magnesium hydroxide/simethicone Suspension 30 milliLiter(s) Oral every 4 hours PRN Dyspepsia  diazepam    Tablet 5 milliGRAM(s) Oral two times a day PRN anxiety  HYDROmorphone   Tablet 1 milliGRAM(s) Oral every 8 hours PRN moderate to severe pain (4-10)  HYDROmorphone  Injectable 0.2 milliGRAM(s) IV Push every 8 hours PRN severe breakthrough pain/prior to imaging  melatonin 3 milliGRAM(s) Oral at bedtime PRN Insomnia  ondansetron Injectable 4 milliGRAM(s) IV Push every 8 hours PRN Nausea and/or Vomiting  polyethylene glycol 3350 17 Gram(s) Oral daily PRN Constipation      Vital Signs Last 24 Hrs  T(C): 36.5 (03 Oct 2024 12:38), Max: 36.6 (02 Oct 2024 21:21)  T(F): 97.7 (03 Oct 2024 12:38), Max: 97.8 (02 Oct 2024 21:21)  HR: 77 (03 Oct 2024 12:38) (65 - 77)  BP: 104/68 (03 Oct 2024 12:38) (101/67 - 110/71)  BP(mean): --  RR: 18 (03 Oct 2024 12:38) (17 - 18)  SpO2: 93% (03 Oct 2024 12:38) (93% - 99%)    Parameters below as of 03 Oct 2024 12:38  Patient On (Oxygen Delivery Method): room air      CAPILLARY BLOOD GLUCOSE        I&O's Summary      PHYSICAL EXAM:  GENERAL: NAD, frail appearing female  CHEST/LUNG: Clear to auscultation bilaterally; No wheeze  HEART: Regular rate and rhythm  ABDOMEN: Soft, Nontender, mildly distended  EXTREMITIES: no LE edema  PSYCH: Calm  NEUROLOGY: AA answers questions appropriately  SKIN: dry and warm    LABS:                        11.1   8.20  )-----------( 530      ( 03 Oct 2024 07:00 )             35.7     10-03    135  |  97[L]  |  25[H]  ----------------------------<  77  4.0   |  27  |  0.40[L]    Ca    9.5      03 Oct 2024 07:00  Phos  3.7     10-03  Mg     1.90     10-03            Urinalysis Basic - ( 03 Oct 2024 07:00 )    Color: x / Appearance: x / SG: x / pH: x  Gluc: 77 mg/dL / Ketone: x  / Bili: x / Urobili: x   Blood: x / Protein: x / Nitrite: x   Leuk Esterase: x / RBC: x / WBC x   Sq Epi: x / Non Sq Epi: x / Bacteria: x        RADIOLOGY & ADDITIONAL TESTS:    Imaging Personally Reviewed:    Consultant(s) Notes Reviewed:      Care Discussed with Consultants/Other Providers:

## 2024-10-04 ENCOUNTER — TRANSCRIPTION ENCOUNTER (OUTPATIENT)
Age: 69
End: 2024-10-04

## 2024-10-04 PROCEDURE — 99232 SBSQ HOSP IP/OBS MODERATE 35: CPT

## 2024-10-04 RX ORDER — SENNOSIDES 8.6 MG
2 TABLET ORAL DAILY
Refills: 0 | Status: DISCONTINUED | OUTPATIENT
Start: 2024-10-04 | End: 2024-10-08

## 2024-10-04 RX ORDER — PANCRELIPASE 8000; 30250; 28750 [USP'U]/1; [USP'U]/1; [USP'U]/1
1 CAPSULE, DELAYED RELEASE ORAL
Refills: 0 | Status: DISCONTINUED | OUTPATIENT
Start: 2024-10-04 | End: 2024-10-08

## 2024-10-04 RX ADMIN — HYDROMORPHONE HYDROCHLORIDE 0.5 MILLIGRAM(S): 1 INJECTION, SOLUTION INTRAMUSCULAR; INTRAVENOUS; SUBCUTANEOUS at 12:04

## 2024-10-04 RX ADMIN — HYDROMORPHONE HYDROCHLORIDE 1 MILLIGRAM(S): 1 INJECTION, SOLUTION INTRAMUSCULAR; INTRAVENOUS; SUBCUTANEOUS at 22:37

## 2024-10-04 RX ADMIN — PANCRELIPASE 1 CAPSULE(S): 8000; 30250; 28750 CAPSULE, DELAYED RELEASE ORAL at 09:33

## 2024-10-04 RX ADMIN — DIAZEPAM 5 MILLIGRAM(S): 10 TABLET ORAL at 15:02

## 2024-10-04 RX ADMIN — Medication 2 TABLET(S): at 09:32

## 2024-10-04 RX ADMIN — Medication 1000 UNIT(S): at 12:05

## 2024-10-04 RX ADMIN — ANTACID TABLETS 1 TABLET(S): 500 TABLET, CHEWABLE ORAL at 21:36

## 2024-10-04 RX ADMIN — PANCRELIPASE 1 CAPSULE(S): 8000; 30250; 28750 CAPSULE, DELAYED RELEASE ORAL at 18:59

## 2024-10-04 RX ADMIN — HYDROMORPHONE HYDROCHLORIDE 1 MILLIGRAM(S): 1 INJECTION, SOLUTION INTRAMUSCULAR; INTRAVENOUS; SUBCUTANEOUS at 21:37

## 2024-10-04 RX ADMIN — PANCRELIPASE 1 CAPSULE(S): 8000; 30250; 28750 CAPSULE, DELAYED RELEASE ORAL at 00:15

## 2024-10-04 RX ADMIN — HYDROMORPHONE HYDROCHLORIDE 0.5 MILLIGRAM(S): 1 INJECTION, SOLUTION INTRAMUSCULAR; INTRAVENOUS; SUBCUTANEOUS at 13:00

## 2024-10-04 RX ADMIN — PANCRELIPASE 1 CAPSULE(S): 8000; 30250; 28750 CAPSULE, DELAYED RELEASE ORAL at 22:53

## 2024-10-04 RX ADMIN — Medication 30 MILLILITER(S): at 18:58

## 2024-10-04 RX ADMIN — HYDROMORPHONE HYDROCHLORIDE 1 MILLIGRAM(S): 1 INJECTION, SOLUTION INTRAMUSCULAR; INTRAVENOUS; SUBCUTANEOUS at 05:12

## 2024-10-04 NOTE — DISCHARGE NOTE PROVIDER - PROVIDER TOKENS
[facility] Medical Records Request for Care Gap Closure    Medical Records Fax: [phone]    Date Requested: 09/15/21  Patient: Yvonne Stuart  Patient : 1950   Requesting on behalf of Provider: MD Amilcar iEd MD has requested the retrieval and updating of CRC: Colonoscopy  The office staff has provided us with the following information listed below  Prior to sending this request I have reviewed Epic Chart Review, completed an Epic Chart Search, and reviewed Care Everywhere documents  Estimated Date of Service:  N/A  Facility: [facility]  Specialty: Lluvia Barone  Performing Provider: n/a  Additional Comments: n/a   Your assistance in completing this request is greatly appreciated  Please send document to [phone] [facility]: Phone [phone]       Sincerely,      Sarai Mcdaniels, [place] FREE:[LAST:[Psych at ],PHONE:[(   )    -],FAX:[(   )    -],FOLLOWUP:[1-3 days]],FREE:[LAST:[Chronic Pain Doctor],FIRST:[Dr BYRON Carl],PHONE:[(   )    -],FAX:[(   )    -],FOLLOWUP:[2 weeks],ESTABLISHEDPATIENT:[T]]

## 2024-10-04 NOTE — PROGRESS NOTE ADULT - PROBLEM SELECTOR PLAN 3
- patient with chronic back pain, poor follow up outpatient  - pain management consult appreciated, started on po dilaudid 0.5mg q8h prn for moderate and 1mg for severe pain,  - patient cannot lay flat for CT scan or MRI studies, refused c/t/l spine xrays as well  - cont lidocaine patch  - cont lyrica 25mg QD for neuropathic pain  - PT eval- no skilled PT needs  - pt with h/o Osteroporosis, has tried meds in the past which did not improve her symptoms, endocrine consulted  started on vit d and ca, Per endo Pt would benefit from anabolic agent. Given severely low T score of hip, Evenity preferred over tymlos/forteo, though nonvertebral/vertebral benefit remains the same. Medication to be discussed and started as outpatient basis, along with DXA scan. Patient prefers to follow up with rheumatology clinic.  - vit d level low

## 2024-10-04 NOTE — PROGRESS NOTE ADULT - PROBLEM SELECTOR PLAN 9
- heparin subq for dvt ppx      awaiting [facility] bed  Plan discussed with ACP

## 2024-10-04 NOTE — DISCHARGE NOTE PROVIDER - HOSPITAL COURSE
67 y/o F with pmhx of chronic back pain, anxiety, depression, OA, IBS hx of suicide attempt presented to the ED for new onset suicidal ideations. Admit for psych eval and pain management of chronic back pain.     Suicidal ideation.   ·- psych consulted and follg  - 1:1 for now  - per psych pt will need inpt psych-involuntary admission to [facility] [place]  -cont valium prn.      Anxiety.   · - patient currently taking 0.5mg Klonopin BID (patient usually take it at least QD and sometimes BID as per family member)  - on valium 5mg bid prn.     Chronic back pain.    - patient with chronic back pain, poor follow up outpatient  - pain management consult appreciated, started on po dilaudid 0.5mg q8h prn for moderate and 1mg for severe pain,  - patient cannot lay flat for CT scan or MRI studies, refused c/t/l spine xrays as well  - cont lidocaine patch  - cont lyrica 25mg QD for neuropathic pain  - PT eval- no skilled PT needs  - pt with h/o Osteroporosis, has tried meds in the past which did not improve her symptoms, endocrine consulted  started on vit d and ca, Per endo Pt would benefit from anabolic agent. Given severely low T score of hip, Evenity preferred over tymlos/forteo, though nonvertebral/vertebral benefit remains the same. Medication to be discussed and started as outpatient basis, along with DXA scan. Patient prefers to follow up with rheumatology clinic.  - vit d level low.     IBS (irritable bowel syndrome).    - follow up outpatient  - no signs of sepsis or concerns of colitis  - c/w Creon at a lower dose.     Major depression.   ·  Plan: - patient was recently prescribed SSRIs but as per family member does not take them because "they do not work"  - psych consulted and follg  -cont current meds.     Vitamin D deficiency.   ·  Plan: vit d level low at 28, pth wnl  cont vit d.    : Anemia.   · Hb stable, ferritin low normal, vit b12wnl, folate elevated  cont po iron   cont to monitor.       69 y/o F with pmhx of chronic back pain, anxiety, depression, OA, IBS hx of suicide attempt presented to the ED for new onset suicidal ideations. Admit for psych eval and pain management of chronic back pain.     Suicidal ideation.   ·- psych consulted and follg  - 1:1 for now  - per psych pt will need inpt psych-involuntary admission to [facility] [place]  -cont valium prn.      Anxiety.   · - patient currently taking 0.5mg Klonopin BID (patient usually take it at least QD and sometimes BID as per family member)  - on valium 5mg bid prn.     Chronic back pain.    - patient with chronic back pain, poor follow up outpatient  - pain management consult appreciated, started on po dilaudid 0.5mg q8h prn for moderate and 1mg for severe pain,  - patient cannot lay flat for CT scan or MRI studies, refused c/t/l spine xrays as well  - cont lidocaine patch  - cont lyrica 25mg QD for neuropathic pain  - PT eval- no skilled PT needs  - pt with h/o Osteroporosis, has tried meds in the past which did not improve her symptoms, endocrine consulted  started on vit d and ca, Per endo Pt would benefit from anabolic agent. Given severely low T score of hip, Evenity preferred over tymlos/forteo, though nonvertebral/vertebral benefit remains the same. Medication to be discussed and started as outpatient basis, along with DXA scan. Patient prefers to follow up with rheumatology clinic.  - vit d level low.  10/7 chronic pain f/u (4262)--> Lyrica started  10/8 patient notes pain is controlled.   Oxycodone sol 2.5 mg q6 prn mod to severe pain. Ocy ir 5mg d/c  Tylenol 500 mg q8 x 2 dayss, then prn.  Out patent f/u Dr BYRON Carl--> out patient pain mng MD.     IBS (irritable bowel syndrome).    - follow up outpatient  - no signs of sepsis or concerns of colitis  - c/w Creon at a lower dose.     Major depression.   ·  Plan: - patient was recently prescribed SSRIs but as per family member does not take them because "they do not work"  - psych consulted and follg  -cont current meds.     Vitamin D deficiency.   ·  Plan: vit d level low at 28, pth wnl  cont vit d.    : Anemia.   · Hb stable, ferritin low normal, vit b12wnl, folate elevated  cont po iron   cont to monitor.    10/8/24  Pain control was optimized and bed available at .  d/c to In patient Psych at .

## 2024-10-04 NOTE — DISCHARGE NOTE PROVIDER - DETAILS OF MALNUTRITION DIAGNOSIS/DIAGNOSES
This patient has been assessed with a concern for Malnutrition and was treated during this hospitalization for the following Nutrition diagnosis/diagnoses:     -  10/01/2024: Severe protein-calorie malnutrition   -  10/01/2024: Underweight (BMI < 19)

## 2024-10-04 NOTE — PROGRESS NOTE ADULT - SUBJECTIVE AND OBJECTIVE BOX
Patient is a 68y old  Female who presents with a chief complaint of chronic back pain, SI (03 Oct 2024 13:35)      SUBJECTIVE / OVERNIGHT EVENTS: Pt seen and examined at 11:07am, no overnight events, pt reports that back pain is under control with 1mg po dilaudid however says that she is afraid to take it frequently as it will cause her to become constipated, wants to have bm daily which is her usual schedule, says that she took senna. No other new issues reported.        MEDICATIONS  (STANDING):  calcium carbonate   1250 mG (OsCal) 1 Tablet(s) Oral two times a day  cholecalciferol 1000 Unit(s) Oral daily  ferrous    sulfate 325 milliGRAM(s) Oral daily  heparin   Injectable 5000 Unit(s) SubCutaneous every 12 hours  influenza  Vaccine (HIGH DOSE) 0.5 milliLiter(s) IntraMuscular once  lidocaine   4% Patch 1 Patch Transdermal daily  pancrelipase  (CREON  6,000 Lipase Units) 1 Capsule(s) Oral three times a day with meals  pregabalin 25 milliGRAM(s) Oral at bedtime  senna 2 Tablet(s) Oral at bedtime    MEDICATIONS  (PRN):  acetaminophen     Tablet .. 650 milliGRAM(s) Oral every 6 hours PRN Temp greater or equal to 38C (100.4F), Mild Pain (1 - 3)  aluminum hydroxide/magnesium hydroxide/simethicone Suspension 30 milliLiter(s) Oral every 4 hours PRN Dyspepsia  diazepam    Tablet 5 milliGRAM(s) Oral two times a day PRN anxiety  HYDROmorphone   Tablet 0.5 milliGRAM(s) Oral every 8 hours PRN Moderate Pain (4 - 6)  HYDROmorphone   Tablet 1 milliGRAM(s) Oral every 8 hours PRN Severe Pain (7 - 10)  melatonin 3 milliGRAM(s) Oral at bedtime PRN Insomnia  ondansetron Injectable 4 milliGRAM(s) IV Push every 8 hours PRN Nausea and/or Vomiting  polyethylene glycol 3350 17 Gram(s) Oral daily PRN Constipation      Vital Signs Last 24 Hrs  T(C): 36.4 (04 Oct 2024 11:33), Max: 36.6 (04 Oct 2024 05:07)  T(F): 97.5 (04 Oct 2024 11:33), Max: 97.9 (04 Oct 2024 05:07)  HR: 73 (04 Oct 2024 11:33) (68 - 73)  BP: 102/68 (04 Oct 2024 11:33) (102/68 - 113/74)  BP(mean): --  RR: 18 (04 Oct 2024 11:33) (16 - 18)  SpO2: 97% (04 Oct 2024 11:33) (94% - 97%)    Parameters below as of 04 Oct 2024 11:33  Patient On (Oxygen Delivery Method): room air      CAPILLARY BLOOD GLUCOSE        I&O's Summary      PHYSICAL EXAM:  GENERAL: NAD, frail appearing female  CHEST/LUNG: Clear to auscultation bilaterally; No wheeze  HEART: Regular rate and rhythm  ABDOMEN: Soft, Nontender, mildly distended  EXTREMITIES: no LE edema  PSYCH: Calm  NEUROLOGY: AA answers questions appropriately  SKIN: dry and warm      LABS:                        11.1   8.20  )-----------( 530      ( 03 Oct 2024 07:00 )             35.7     10-03    135  |  97[L]  |  25[H]  ----------------------------<  77  4.0   |  27  |  0.40[L]    Ca    9.5      03 Oct 2024 07:00  Phos  3.7     10-03  Mg     1.90     10-03            Urinalysis Basic - ( 03 Oct 2024 07:00 )    Color: x / Appearance: x / SG: x / pH: x  Gluc: 77 mg/dL / Ketone: x  / Bili: x / Urobili: x   Blood: x / Protein: x / Nitrite: x   Leuk Esterase: x / RBC: x / WBC x   Sq Epi: x / Non Sq Epi: x / Bacteria: x        RADIOLOGY & ADDITIONAL TESTS:    Imaging Personally Reviewed:    Consultant(s) Notes Reviewed:      Care Discussed with Consultants/Other Providers:

## 2024-10-04 NOTE — DISCHARGE NOTE PROVIDER - NSDCFUADDAPPT_GEN_ALL_CORE_FT
Follow up with PCP within 1-2 weeks of discharge. If you are in need of a general medicine physician and post-discharge medical follow-up for further care/recommendations you may contact the [facility] for an appointment at [phone].     Follow up with psychiatry within 1-2 weeks of discharge.     Follow up with -- within 1-2 weeks of discharge.

## 2024-10-04 NOTE — DISCHARGE NOTE PROVIDER - NSDCCPCAREPLAN_GEN_ALL_CORE_FT
PRINCIPAL DISCHARGE DIAGNOSIS  Diagnosis: Suicide attempt  Assessment and Plan of Treatment: you were seen by psychiatrist, take your medication, you are being transferred to [facility] for stabilzation      SECONDARY DISCHARGE DIAGNOSES  Diagnosis: Chronic back pain  Assessment and Plan of Treatment: take pain medication and follow up with pain doctor    Diagnosis: Vitamin D deficiency  Assessment and Plan of Treatment: take medication as prescribed and follow up with endocrinologist and rheumatologist    Diagnosis: Depression  Assessment and Plan of Treatment: take your medication and follow up with psychiatrist     PRINCIPAL DISCHARGE DIAGNOSIS  Diagnosis: Suicide attempt  Assessment and Plan of Treatment: you were seen by psychiatrist, take your medication, you are being transferred to [facility] for stabilzation      SECONDARY DISCHARGE DIAGNOSES  Diagnosis: Depression  Assessment and Plan of Treatment: take your medication and follow up with psychiatrist    Diagnosis: Chronic back pain  Assessment and Plan of Treatment: take pain medication and follow up with pain doctor, Out patient follow up with Dr BYRON Carl on discharge    Diagnosis: Vitamin D deficiency  Assessment and Plan of Treatment: take medication as prescribed and follow up with endocrinologist and rheumatologist

## 2024-10-04 NOTE — DISCHARGE NOTE PROVIDER - CARE PROVIDER_API CALL
Psych at ,   Phone: (   )    -  Fax: (   )    -  Follow Up Time: 1-3 days    Chronic Pain Doctor, Dr BYRON Carl  Phone: (   )    -  Fax: (   )    -  Established Patient  Follow Up Time: 2 weeks

## 2024-10-04 NOTE — DISCHARGE NOTE PROVIDER - NSDCMRMEDTOKEN_GEN_ALL_CORE_FT
calcium (as carbonate) 600 mg oral tablet: 1 tab(s) orally 2 times a day with meals  clonazePAM 0.5 mg oral tablet: 1 tab(s) orally 2 times a day with meals (ISTOP Reference #: [id number]; 15 tablets for a 7-day supply)  Creon 6000 units oral delayed release capsule: 1 cap(s) orally 3 times a day with meals (take 2 capsules for meals with over 10g of fat)  Curcumin OTC supplement: 1 dose orally once a day  magnesium citrate 100 mg oral capsule: 1 cap(s) orally once a day  oxyCODONE 5 mg/5 mL oral solution: 1 milliliter(s) orally every 4 hours as needed for pain (ISTOP Reference #: [id number]; 120 mL for a 20-day supply dispensed 08/30/2024)  Sciatica OTC supplement: 2 capsules in the morning and 2 capsules in the evening  senna (sennosides) 8.6 mg oral tablet: 1 to 2 tab(s) orally once a day at bedtime as needed for constipation  Vitamin B Complex oral tablet: 1 tab(s) orally once a day  Vitamin D3 10 mcg (400 intl units) oral capsule: 2 cap(s) orally once a day   aluminum hydroxide-magnesium hydroxide 200 mg-200 mg/5 mL oral suspension: 30 milliliter(s) orally every 4 hours As needed Dyspepsia  calcium carbonate 1250 mg (500 mg elemental calcium) oral tablet: 1 tab(s) orally 2 times a day  cholecalciferol oral tablet: 1000 unit(s) orally once a day  diazePAM 5 mg oral tablet: 1 tab(s) orally 2 times a day As needed anxiety  ferrous sulfate 325 mg (65 mg elemental iron) oral tablet: 1 tab(s) orally once a day  melatonin 3 mg oral tablet: 1 tab(s) orally once a day (at bedtime) As needed Insomnia  mupirocin 2% topical ointment: 1 Apply topically to affected area 2 times a day  oxyCODONE 5 mg/5 mL oral solution: 2.5 milliliter(s) orally every 6 hours As needed Moderate Pain (4 - 6) and severe pain  pancrelipase 6000 units-19,000 units-30,000 units oral delayed release capsule: 1 cap(s) orally 3 times a day (with meals)  pancrelipase 6000 units-19,000 units-30,000 units oral delayed release capsule: 1 cap(s) orally once a day (before a meal) As needed Bedtime snack  polyethylene glycol 3350 oral powder for reconstitution: 17 gram(s) orally once a day As needed Constipation  pregabalin 25 mg oral capsule: 1 cap(s) orally 3 times a day  senna leaf extract oral tablet: 2 tab(s) orally once a day As needed Constipation  simethicone 80 mg oral tablet, chewable: 1 tab(s) orally every 8 hours As needed Gas  Tylenol 325 mg oral tablet: 2 tab(s) orally every 6 hours as needed for  mild pain

## 2024-10-05 PROCEDURE — 99232 SBSQ HOSP IP/OBS MODERATE 35: CPT

## 2024-10-05 RX ORDER — OXYCODONE AND ACETAMINOPHEN 5; 325 MG/1; MG/1
0.5 TABLET ORAL EVERY 8 HOURS
Refills: 0 | Status: DISCONTINUED | OUTPATIENT
Start: 2024-10-05 | End: 2024-10-06

## 2024-10-05 RX ORDER — OXYCODONE AND ACETAMINOPHEN 5; 325 MG/1; MG/1
1 TABLET ORAL EVERY 8 HOURS
Refills: 0 | Status: DISCONTINUED | OUTPATIENT
Start: 2024-10-05 | End: 2024-10-06

## 2024-10-05 RX ADMIN — Medication 650 MILLIGRAM(S): at 21:40

## 2024-10-05 RX ADMIN — Medication 30 MILLILITER(S): at 06:16

## 2024-10-05 RX ADMIN — ANTACID TABLETS 1 TABLET(S): 500 TABLET, CHEWABLE ORAL at 06:16

## 2024-10-05 RX ADMIN — ANTACID TABLETS 1 TABLET(S): 500 TABLET, CHEWABLE ORAL at 18:13

## 2024-10-05 RX ADMIN — Medication 2 TABLET(S): at 12:15

## 2024-10-05 RX ADMIN — PANCRELIPASE 1 CAPSULE(S): 8000; 30250; 28750 CAPSULE, DELAYED RELEASE ORAL at 18:12

## 2024-10-05 RX ADMIN — Medication 17 GRAM(S): at 08:56

## 2024-10-05 RX ADMIN — Medication 650 MILLIGRAM(S): at 12:13

## 2024-10-05 RX ADMIN — PANCRELIPASE 1 CAPSULE(S): 8000; 30250; 28750 CAPSULE, DELAYED RELEASE ORAL at 14:35

## 2024-10-05 RX ADMIN — Medication 650 MILLIGRAM(S): at 13:13

## 2024-10-05 RX ADMIN — Medication 1000 UNIT(S): at 12:14

## 2024-10-05 RX ADMIN — Medication 650 MILLIGRAM(S): at 22:40

## 2024-10-05 RX ADMIN — Medication 80 MILLIGRAM(S): at 14:36

## 2024-10-05 RX ADMIN — PANCRELIPASE 1 CAPSULE(S): 8000; 30250; 28750 CAPSULE, DELAYED RELEASE ORAL at 09:48

## 2024-10-05 RX ADMIN — Medication 30 MILLILITER(S): at 14:35

## 2024-10-05 RX ADMIN — DIAZEPAM 5 MILLIGRAM(S): 10 TABLET ORAL at 00:06

## 2024-10-05 RX ADMIN — Medication 30 MILLILITER(S): at 21:39

## 2024-10-05 NOTE — PROGRESS NOTE ADULT - PROBLEM SELECTOR PLAN 3
- patient with chronic back pain, poor follow up outpatient  - pain management consult appreciated, started on po dilaudid 0.5mg q8h prn for moderate and 1mg for severe pain, though pt states she would prefer oxycodone so will trial  - patient cannot lay flat for CT scan or MRI studies, refused c/t/l spine xrays as well  - cont lidocaine patch  - cont lyrica 25mg QD for neuropathic pain  - PT eval- no skilled PT needs  - pt with h/o Osteroporosis, has tried meds in the past which did not improve her symptoms, endocrine consulted  started on vit d and ca, Per endo Pt would benefit from anabolic agent. Given severely low T score of hip, Evenity preferred over tymlos/forteo, though nonvertebral/vertebral benefit remains the same. Medication to be discussed and started as outpatient basis, along with DXA scan. Patient prefers to follow up with rheumatology clinic.  - vit d level low

## 2024-10-05 NOTE — PROGRESS NOTE ADULT - PROBLEM SELECTOR PLAN 9
- heparin subq for dvt ppx      [place] [facility] [place]  Plan discussed with ACP

## 2024-10-05 NOTE — PROGRESS NOTE ADULT - SUBJECTIVE AND OBJECTIVE BOX
Patient is a 68y old  Female who presents with a chief complaint of chronic back pain, SI (04 Oct 2024 15:57)      SUBJECTIVE / OVERNIGHT EVENTS:  ADDITIONAL REVIEW OF SYSTEMS:    MEDICATIONS  (STANDING):  calcium carbonate   1250 mG (OsCal) 1 Tablet(s) Oral two times a day  cholecalciferol 1000 Unit(s) Oral daily  ferrous    sulfate 325 milliGRAM(s) Oral daily  heparin   Injectable 5000 Unit(s) SubCutaneous every 12 hours  influenza  Vaccine (HIGH DOSE) 0.5 milliLiter(s) IntraMuscular once  lidocaine   4% Patch 1 Patch Transdermal daily  pancrelipase  (CREON  6,000 Lipase Units) 1 Capsule(s) Oral three times a day with meals  pregabalin 25 milliGRAM(s) Oral at bedtime    MEDICATIONS  (PRN):  acetaminophen     Tablet .. 650 milliGRAM(s) Oral every 6 hours PRN Temp greater or equal to 38C (100.4F), Mild Pain (1 - 3)  aluminum hydroxide/magnesium hydroxide/simethicone Suspension 30 milliLiter(s) Oral every 4 hours PRN Dyspepsia  diazepam    Tablet 5 milliGRAM(s) Oral two times a day PRN anxiety  HYDROmorphone   Tablet 1 milliGRAM(s) Oral every 8 hours PRN Severe Pain (7 - 10)  HYDROmorphone   Tablet 0.5 milliGRAM(s) Oral every 8 hours PRN Moderate Pain (4 - 6)  melatonin 3 milliGRAM(s) Oral at bedtime PRN Insomnia  ondansetron Injectable 4 milliGRAM(s) IV Push every 8 hours PRN Nausea and/or Vomiting  pancrelipase  (CREON  6,000 Lipase Units) 1 Capsule(s) Oral with dinner PRN Bedtime snack  polyethylene glycol 3350 17 Gram(s) Oral daily PRN Constipation  senna 2 Tablet(s) Oral daily PRN Constipation      CAPILLARY BLOOD GLUCOSE        I&O's Summary      PHYSICAL EXAM:  Vital Signs Last 24 Hrs  T(C): 36.3 (05 Oct 2024 11:48), Max: 36.4 (04 Oct 2024 20:07)  T(F): 97.4 (05 Oct 2024 11:48), Max: 97.5 (04 Oct 2024 20:07)  HR: 76 (05 Oct 2024 11:48) (65 - 76)  BP: 93/53 (05 Oct 2024 11:48) (93/53 - 103/69)  BP(mean): --  RR: 16 (05 Oct 2024 11:48) (16 - 17)  SpO2: 95% (05 Oct 2024 11:48) (95% - 98%)    Parameters below as of 05 Oct 2024 11:48  Patient On (Oxygen Delivery Method): room air      CONSTITUTIONAL: NAD, well-developed, well-groomed  EYES: PERRLA; conjunctiva and sclera clear  ENMT: Moist oral mucosa, no pharyngeal injection or exudates; normal dentition  NECK: Supple, no palpable masses; no thyromegaly  RESPIRATORY: Normal respiratory effort; lungs are clear to auscultation bilaterally  CARDIOVASCULAR: Regular rate and rhythm, normal S1 and S2, no murmur/rub/gallop; No lower extremity edema; Peripheral pulses are 2+ bilaterally  ABDOMEN: Nontender to palpation, normoactive bowel sounds, no rebound/guarding; No hepatosplenomegaly  MUSCULOSKELETAL:  Normal gait; no clubbing or cyanosis of digits; no joint swelling or tenderness to palpation  PSYCH: A+O to person, place, and time; affect appropriate  NEUROLOGY: CN 2-12 are intact and symmetric; no gross sensory deficits   SKIN: No rashes; no palpable lesions

## 2024-10-06 PROCEDURE — 99232 SBSQ HOSP IP/OBS MODERATE 35: CPT

## 2024-10-06 RX ORDER — OXYCODONE HYDROCHLORIDE 30 MG/1
2.5 TABLET, FILM COATED, EXTENDED RELEASE ORAL EVERY 8 HOURS
Refills: 0 | Status: DISCONTINUED | OUTPATIENT
Start: 2024-10-06 | End: 2024-10-08

## 2024-10-06 RX ORDER — OXYCODONE HYDROCHLORIDE 30 MG/1
5 TABLET, FILM COATED, EXTENDED RELEASE ORAL EVERY 8 HOURS
Refills: 0 | Status: DISCONTINUED | OUTPATIENT
Start: 2024-10-06 | End: 2024-10-08

## 2024-10-06 RX ORDER — HYDROMORPHONE HYDROCHLORIDE 1 MG/ML
0.2 INJECTION, SOLUTION INTRAMUSCULAR; INTRAVENOUS; SUBCUTANEOUS ONCE
Refills: 0 | Status: DISCONTINUED | OUTPATIENT
Start: 2024-10-06 | End: 2024-10-06

## 2024-10-06 RX ADMIN — PANCRELIPASE 1 CAPSULE(S): 8000; 30250; 28750 CAPSULE, DELAYED RELEASE ORAL at 13:44

## 2024-10-06 RX ADMIN — DIAZEPAM 5 MILLIGRAM(S): 10 TABLET ORAL at 00:11

## 2024-10-06 RX ADMIN — Medication 1000 UNIT(S): at 13:44

## 2024-10-06 RX ADMIN — ANTACID TABLETS 1 TABLET(S): 500 TABLET, CHEWABLE ORAL at 09:48

## 2024-10-06 RX ADMIN — DIAZEPAM 5 MILLIGRAM(S): 10 TABLET ORAL at 10:08

## 2024-10-06 RX ADMIN — Medication 650 MILLIGRAM(S): at 07:24

## 2024-10-06 RX ADMIN — HYDROMORPHONE HYDROCHLORIDE 0.2 MILLIGRAM(S): 1 INJECTION, SOLUTION INTRAMUSCULAR; INTRAVENOUS; SUBCUTANEOUS at 18:12

## 2024-10-06 RX ADMIN — Medication 650 MILLIGRAM(S): at 14:43

## 2024-10-06 RX ADMIN — OXYCODONE HYDROCHLORIDE 2.5 MILLIGRAM(S): 30 TABLET, FILM COATED, EXTENDED RELEASE ORAL at 08:25

## 2024-10-06 RX ADMIN — Medication 650 MILLIGRAM(S): at 08:24

## 2024-10-06 RX ADMIN — PANCRELIPASE 1 CAPSULE(S): 8000; 30250; 28750 CAPSULE, DELAYED RELEASE ORAL at 18:12

## 2024-10-06 RX ADMIN — HYDROMORPHONE HYDROCHLORIDE 0.2 MILLIGRAM(S): 1 INJECTION, SOLUTION INTRAMUSCULAR; INTRAVENOUS; SUBCUTANEOUS at 18:45

## 2024-10-06 RX ADMIN — PANCRELIPASE 1 CAPSULE(S): 8000; 30250; 28750 CAPSULE, DELAYED RELEASE ORAL at 09:48

## 2024-10-06 RX ADMIN — PREGABALIN 25 MILLIGRAM(S): 25 CAPSULE ORAL at 21:37

## 2024-10-06 RX ADMIN — OXYCODONE HYDROCHLORIDE 2.5 MILLIGRAM(S): 30 TABLET, FILM COATED, EXTENDED RELEASE ORAL at 09:25

## 2024-10-06 RX ADMIN — ANTACID TABLETS 1 TABLET(S): 500 TABLET, CHEWABLE ORAL at 18:12

## 2024-10-06 RX ADMIN — Medication 30 MILLILITER(S): at 21:38

## 2024-10-06 RX ADMIN — Medication 650 MILLIGRAM(S): at 13:43

## 2024-10-06 NOTE — PROGRESS NOTE ADULT - SUBJECTIVE AND OBJECTIVE BOX
Patient is a 68y old  Female who presents with a chief complaint of chronic back pain, SI (05 Oct 2024 12:53)      SUBJECTIVE / OVERNIGHT EVENTS: Today pt states her pain is worse than earlier, specifically pain in the lower back radiating to the legs. No sustained relief with oxycodone and felt drowsy.    MEDICATIONS  (STANDING):  calcium carbonate   1250 mG (OsCal) 1 Tablet(s) Oral two times a day  cholecalciferol 1000 Unit(s) Oral daily  ferrous    sulfate 325 milliGRAM(s) Oral daily  heparin   Injectable 5000 Unit(s) SubCutaneous every 12 hours  influenza  Vaccine (HIGH DOSE) 0.5 milliLiter(s) IntraMuscular once  lidocaine   4% Patch 1 Patch Transdermal daily  pancrelipase  (CREON  6,000 Lipase Units) 1 Capsule(s) Oral three times a day with meals  pregabalin 25 milliGRAM(s) Oral at bedtime    MEDICATIONS  (PRN):  acetaminophen     Tablet .. 650 milliGRAM(s) Oral every 6 hours PRN Temp greater or equal to 38C (100.4F), Mild Pain (1 - 3)  aluminum hydroxide/magnesium hydroxide/simethicone Suspension 30 milliLiter(s) Oral every 4 hours PRN Dyspepsia  diazepam    Tablet 5 milliGRAM(s) Oral two times a day PRN anxiety  melatonin 3 milliGRAM(s) Oral at bedtime PRN Insomnia  ondansetron Injectable 4 milliGRAM(s) IV Push every 8 hours PRN Nausea and/or Vomiting  oxyCODONE    Solution 2.5 milliGRAM(s) Oral every 8 hours PRN Moderate Pain (4 - 6)  oxyCODONE    Solution 5 milliGRAM(s) Oral every 8 hours PRN Severe Pain (7 - 10)  pancrelipase  (CREON  6,000 Lipase Units) 1 Capsule(s) Oral with dinner PRN Bedtime snack  polyethylene glycol 3350 17 Gram(s) Oral daily PRN Constipation  senna 2 Tablet(s) Oral daily PRN Constipation  simethicone 80 milliGRAM(s) Chew every 8 hours PRN Gas      CAPILLARY BLOOD GLUCOSE        I&O's Summary    05 Oct 2024 07:01  -  06 Oct 2024 07:00  --------------------------------------------------------  IN: 0 mL / OUT: 300 mL / NET: -300 mL        PHYSICAL EXAM:  Vital Signs Last 24 Hrs  T(C): 36.5 (06 Oct 2024 06:53), Max: 36.5 (06 Oct 2024 06:53)  T(F): 97.7 (06 Oct 2024 06:53), Max: 97.7 (06 Oct 2024 06:53)  HR: 69 (06 Oct 2024 06:53) (68 - 71)  BP: 112/72 (06 Oct 2024 06:53) (100/61 - 112/72)  BP(mean): --  RR: 18 (06 Oct 2024 06:53) (16 - 18)  SpO2: 98% (06 Oct 2024 06:53) (95% - 99%)    Parameters below as of 06 Oct 2024 06:53  Patient On (Oxygen Delivery Method): room air      CONSTITUTIONAL: NAD, well-developed, well-groomed  EYES: PERRLA; conjunctiva and sclera clear  ENMT: Moist oral mucosa, no pharyngeal injection or exudates; normal dentition  RESPIRATORY: Normal respiratory effort; lungs are clear to auscultation bilaterally  CARDIOVASCULAR: Regular rate and rhythm, normal S1 and S2, no murmur/rub/gallop; No lower extremity edema; Peripheral pulses are 2+ bilaterally  ABDOMEN: Nontender to palpation, normoactive bowel sounds, no rebound/guarding; No hepatosplenomegaly

## 2024-10-06 NOTE — PROGRESS NOTE ADULT - PROBLEM SELECTOR PLAN 9
- heparin subq for dvt ppx      awaiting [facility] [place] bed  Plan discussed with ACP

## 2024-10-06 NOTE — PROGRESS NOTE ADULT - PROBLEM SELECTOR PLAN 3
- patient with chronic back pain, poor follow up outpatient  - pain management consult appreciated, pt not getting sustained relief from opioids and experiencing drowsiness -- will minimize  - patient cannot lay flat for CT scan or MRI studies, refused c/t/l spine xrays as well  - cont lidocaine patch  - cont lyrica 25mg QD for neuropathic pain -- pt not currently agreeable to titrating up  - PT eval- no skilled PT needs  - pt with h/o Osteroporosis, has tried meds in the past which did not improve her symptoms, endocrine consulted  started on vit d and ca, Per endo Pt would benefit from anabolic agent. Given severely low T score of hip, Evenity preferred over tymlos/forteo, though nonvertebral/vertebral benefit remains the same. Medication to be discussed and started as outpatient basis, along with DXA scan. Patient prefers to follow up with rheumatology clinic.  - vit d level low

## 2024-10-07 LAB
ANION GAP SERPL CALC-SCNC: 12 MMOL/L — SIGNIFICANT CHANGE UP (ref 7–14)
BUN SERPL-MCNC: 12 MG/DL — SIGNIFICANT CHANGE UP (ref 7–23)
CALCIUM SERPL-MCNC: 9.3 MG/DL — SIGNIFICANT CHANGE UP (ref 8.4–10.5)
CHLORIDE SERPL-SCNC: 97 MMOL/L — LOW (ref 98–107)
CO2 SERPL-SCNC: 26 MMOL/L — SIGNIFICANT CHANGE UP (ref 22–31)
CREAT SERPL-MCNC: 0.42 MG/DL — LOW (ref 0.5–1.3)
EGFR: 107 ML/MIN/1.73M2 — SIGNIFICANT CHANGE UP
GLUCOSE SERPL-MCNC: 102 MG/DL — HIGH (ref 70–99)
HCT VFR BLD CALC: 34.8 % — SIGNIFICANT CHANGE UP (ref 34.5–45)
HGB BLD-MCNC: 10.9 G/DL — LOW (ref 11.5–15.5)
MAGNESIUM SERPL-MCNC: 2.1 MG/DL — SIGNIFICANT CHANGE UP (ref 1.6–2.6)
MCHC RBC-ENTMCNC: 25.5 PG — LOW (ref 27–34)
MCHC RBC-ENTMCNC: 31.3 GM/DL — LOW (ref 32–36)
MCV RBC AUTO: 81.3 FL — SIGNIFICANT CHANGE UP (ref 80–100)
MRSA PCR RESULT.: SIGNIFICANT CHANGE UP
NRBC # BLD: 0 /100 WBCS — SIGNIFICANT CHANGE UP (ref 0–0)
NRBC # FLD: 0 K/UL — SIGNIFICANT CHANGE UP (ref 0–0)
PHOSPHATE SERPL-MCNC: 4.4 MG/DL — SIGNIFICANT CHANGE UP (ref 2.5–4.5)
PLATELET # BLD AUTO: 509 K/UL — HIGH (ref 150–400)
POTASSIUM SERPL-MCNC: 4.5 MMOL/L — SIGNIFICANT CHANGE UP (ref 3.5–5.3)
POTASSIUM SERPL-SCNC: 4.5 MMOL/L — SIGNIFICANT CHANGE UP (ref 3.5–5.3)
RBC # BLD: 4.28 M/UL — SIGNIFICANT CHANGE UP (ref 3.8–5.2)
RBC # FLD: 17.6 % — HIGH (ref 10.3–14.5)
S AUREUS DNA NOSE QL NAA+PROBE: DETECTED
SARS-COV-2 RNA SPEC QL NAA+PROBE: SIGNIFICANT CHANGE UP
SODIUM SERPL-SCNC: 135 MMOL/L — SIGNIFICANT CHANGE UP (ref 135–145)
WBC # BLD: 4.11 K/UL — SIGNIFICANT CHANGE UP (ref 3.8–10.5)
WBC # FLD AUTO: 4.11 K/UL — SIGNIFICANT CHANGE UP (ref 3.8–10.5)

## 2024-10-07 PROCEDURE — 99232 SBSQ HOSP IP/OBS MODERATE 35: CPT

## 2024-10-07 RX ORDER — ACETAMINOPHEN 325 MG
500 TABLET ORAL EVERY 8 HOURS
Refills: 0 | Status: DISCONTINUED | OUTPATIENT
Start: 2024-10-07 | End: 2024-10-07

## 2024-10-07 RX ORDER — ACETAMINOPHEN 325 MG
650 TABLET ORAL EVERY 6 HOURS
Refills: 0 | Status: DISCONTINUED | OUTPATIENT
Start: 2024-10-07 | End: 2024-10-08

## 2024-10-07 RX ORDER — CHLORHEXIDINE GLUCONATE ORAL RINSE 1.2 MG/ML
1 SOLUTION DENTAL DAILY
Refills: 0 | Status: DISCONTINUED | OUTPATIENT
Start: 2024-10-07 | End: 2024-10-08

## 2024-10-07 RX ORDER — MUPIROCIN 20 MG/G
1 OINTMENT TOPICAL EVERY 12 HOURS
Refills: 0 | Status: DISCONTINUED | OUTPATIENT
Start: 2024-10-07 | End: 2024-10-08

## 2024-10-07 RX ORDER — PREGABALIN 25 MG/1
25 CAPSULE ORAL
Refills: 0 | Status: DISCONTINUED | OUTPATIENT
Start: 2024-10-07 | End: 2024-10-08

## 2024-10-07 RX ADMIN — PANCRELIPASE 1 CAPSULE(S): 8000; 30250; 28750 CAPSULE, DELAYED RELEASE ORAL at 13:12

## 2024-10-07 RX ADMIN — Medication 30 MILLILITER(S): at 13:13

## 2024-10-07 RX ADMIN — PANCRELIPASE 1 CAPSULE(S): 8000; 30250; 28750 CAPSULE, DELAYED RELEASE ORAL at 09:32

## 2024-10-07 RX ADMIN — Medication 1000 UNIT(S): at 13:12

## 2024-10-07 RX ADMIN — DIAZEPAM 5 MILLIGRAM(S): 10 TABLET ORAL at 02:01

## 2024-10-07 RX ADMIN — OXYCODONE HYDROCHLORIDE 2.5 MILLIGRAM(S): 30 TABLET, FILM COATED, EXTENDED RELEASE ORAL at 05:38

## 2024-10-07 RX ADMIN — PANCRELIPASE 1 CAPSULE(S): 8000; 30250; 28750 CAPSULE, DELAYED RELEASE ORAL at 18:26

## 2024-10-07 RX ADMIN — Medication 17 GRAM(S): at 13:15

## 2024-10-07 RX ADMIN — DIAZEPAM 5 MILLIGRAM(S): 10 TABLET ORAL at 09:32

## 2024-10-07 RX ADMIN — CHLORHEXIDINE GLUCONATE ORAL RINSE 1 APPLICATION(S): 1.2 SOLUTION DENTAL at 13:14

## 2024-10-07 RX ADMIN — PREGABALIN 25 MILLIGRAM(S): 25 CAPSULE ORAL at 20:23

## 2024-10-07 RX ADMIN — ANTACID TABLETS 1 TABLET(S): 500 TABLET, CHEWABLE ORAL at 09:31

## 2024-10-07 RX ADMIN — ANTACID TABLETS 1 TABLET(S): 500 TABLET, CHEWABLE ORAL at 18:27

## 2024-10-07 NOTE — BH CONSULTATION LIAISON PROGRESS NOTE - NSBHTIMEACTIVITIESPERFORMED_PSY_A_CORE
Discussed with patient, team, SW, and chart reviewed. Time exclusive of supervision/education. 

## 2024-10-07 NOTE — BH CONSULTATION LIAISON PROGRESS NOTE - NSBHATTESTCOMMENTATTENDFT_PSY_A_CORE
agree with above, patient still in pain but little understanding of the dangers of her actions, prior suicide attempt etc., told that she will likely require inpt psych hospitalization. c/w meds as above, psych will follow. 
agree with above, patient with primary complaints of pain, plan as above.

## 2024-10-07 NOTE — PROGRESS NOTE ADULT - SUBJECTIVE AND OBJECTIVE BOX
Patient is a 68y old  Female who presents with a chief complaint of chronic back pain, SI (07 Oct 2024 09:40)      SUBJECTIVE / OVERNIGHT EVENTS:  Resting in bed  Has neck cushion  Has chronic back pain  Patient is asking to see chronic pain again    MEDICATIONS  (STANDING):  calcium carbonate   1250 mG (OsCal) 1 Tablet(s) Oral two times a day  cholecalciferol 1000 Unit(s) Oral daily  ferrous    sulfate 325 milliGRAM(s) Oral daily  heparin   Injectable 5000 Unit(s) SubCutaneous every 12 hours  influenza  Vaccine (HIGH DOSE) 0.5 milliLiter(s) IntraMuscular once  lidocaine   4% Patch 1 Patch Transdermal daily  pancrelipase  (CREON  6,000 Lipase Units) 1 Capsule(s) Oral three times a day with meals  pregabalin 25 milliGRAM(s) Oral at bedtime    MEDICATIONS  (PRN):  acetaminophen     Tablet .. 650 milliGRAM(s) Oral every 6 hours PRN Temp greater or equal to 38C (100.4F), Mild Pain (1 - 3)  aluminum hydroxide/magnesium hydroxide/simethicone Suspension 30 milliLiter(s) Oral every 4 hours PRN Dyspepsia  diazepam    Tablet 5 milliGRAM(s) Oral two times a day PRN anxiety  melatonin 3 milliGRAM(s) Oral at bedtime PRN Insomnia  ondansetron Injectable 4 milliGRAM(s) IV Push every 8 hours PRN Nausea and/or Vomiting  oxyCODONE    Solution 2.5 milliGRAM(s) Oral every 8 hours PRN Moderate Pain (4 - 6)  oxyCODONE    Solution 5 milliGRAM(s) Oral every 8 hours PRN Severe Pain (7 - 10)  pancrelipase  (CREON  6,000 Lipase Units) 1 Capsule(s) Oral with dinner PRN Bedtime snack  polyethylene glycol 3350 17 Gram(s) Oral daily PRN Constipation  senna 2 Tablet(s) Oral daily PRN Constipation  simethicone 80 milliGRAM(s) Chew every 8 hours PRN Ga    Vital Signs Last 24 Hrs  T(C): 36.2 (07 Oct 2024 05:30), Max: 36.7 (06 Oct 2024 13:50)  T(F): 97.2 (07 Oct 2024 05:30), Max: 98 (06 Oct 2024 13:50)  HR: 64 (07 Oct 2024 05:30) (64 - 77)  BP: 102/61 (07 Oct 2024 05:30) (98/64 - 108/71)  RR: 18 (07 Oct 2024 05:30) (17 - 18)  SpO2: 98% (07 Oct 2024 05:30) (96% - 98%)  room air        PHYSICAL EXAM:  GENERAL: NAD,   HEAD:  Atraumatic, Normocephalic  EYES: EOMI, PERRLA, conjunctiva and sclera clear  NECK: Supple, No JVD, neck cushion  CHEST/LUNG: Clear to auscultation bilaterally; No wheeze  HEART: Regular rate and rhythm; No murmurs, rubs, or gallops  ABDOMEN: Soft, Nontender, distended; Bowel sounds present  EXTREMITIES:  2+ Peripheral Pulses, No clubbing, cyanosis, or edema  PSYCH: Alert  NEUROLOGY: non-focal      LABS:                        10.9   4.11  )-----------( 509      ( 07 Oct 2024 06:23 )             34.8     10-07    135  |  97[L]  |  12  ----------------------------<  102[H]  4.5   |  26  |  0.42[L]    Ca    9.3      07 Oct 2024 06:23  Phos  4.4     10-07  Mg     2.10     10-07           Care Discussed with Consultants/Other Providers:  Chronic pain to see  Awaiting bed at [facility]

## 2024-10-07 NOTE — CHART NOTE - NSCHARTNOTEFT_GEN_A_CORE
Nutrition Follow-Up Note         Source: Patient A&Ox 3   Family [ ]     RN /PCA [x ]    Chart [x ]     Pt seen for nutrition follow up due to severe malnutrition.    MEDICAL COURSE  Per chart review, Patient is a 67 y/o F with pmhx of chronic back pain, anxiety, depression, OA, IBS hx of suicide attempt presented to the ED for new onset suicidal ideations. Admit for psych eval and pain management of chronic back pain.      ACTIVE DIET ORDER  Diet, Pureed:   Lactose Restricted (Milk Sugar Intoler.)  Gluten-Gliadin Restricted  Ovo Vegetarian (Accepts Eggs) (10-05-24 @ 14:46)      NUTRITION COURSE  Patient seen in her room, PCA at bedside for 1 on 1 monitoring. Patient c/o multiple food intolerance 2/2 IBS. Per discussion with patient, she is able to eat fish and butter. Recommend to clarify diet order. Prefers no milk, yogurt, orange juice, coffee, soy milk, almond milk, peas/beans, spices, and tomato sauce. Patient reported limited food tolerance such as swiss cheese and cheddar cheese.      PERTINENT MEDICATION  MEDICATIONS  (STANDING):  calcium carbonate   1250 mG (OsCal) 1 Tablet(s) Oral two times a day  chlorhexidine 2% Cloths 1 Application(s) Topical daily  cholecalciferol 1000 Unit(s) Oral daily  ferrous    sulfate 325 milliGRAM(s) Oral daily  heparin   Injectable 5000 Unit(s) SubCutaneous every 12 hours  influenza  Vaccine (HIGH DOSE) 0.5 milliLiter(s) IntraMuscular once  lidocaine   4% Patch 1 Patch Transdermal daily  pancrelipase  (CREON  6,000 Lipase Units) 1 Capsule(s) Oral three times a day with meals  pregabalin 25 milliGRAM(s) Oral at bedtime    MEDICATIONS  (PRN):  acetaminophen     Tablet .. 650 milliGRAM(s) Oral every 6 hours PRN Temp greater or equal to 38C (100.4F), Mild Pain (1 - 3)  aluminum hydroxide/magnesium hydroxide/simethicone Suspension 30 milliLiter(s) Oral every 4 hours PRN Dyspepsia  diazepam    Tablet 5 milliGRAM(s) Oral two times a day PRN anxiety  melatonin 3 milliGRAM(s) Oral at bedtime PRN Insomnia  ondansetron Injectable 4 milliGRAM(s) IV Push every 8 hours PRN Nausea and/or Vomiting  oxyCODONE    Solution 2.5 milliGRAM(s) Oral every 8 hours PRN Moderate Pain (4 - 6)  oxyCODONE    Solution 5 milliGRAM(s) Oral every 8 hours PRN Severe Pain (7 - 10)  pancrelipase  (CREON  6,000 Lipase Units) 1 Capsule(s) Oral with dinner PRN Bedtime snack  polyethylene glycol 3350 17 Gram(s) Oral daily PRN Constipation  senna 2 Tablet(s) Oral daily PRN Constipation  simethicone 80 milliGRAM(s) Chew every 8 hours PRN Gas      PERTINENT LABS  10-07    135  |  97[L]  |  12  ----------------------------<  102[H]  4.5   |  26  |  0.42[L]    Ca    9.3      07 Oct 2024 06:23  Phos  4.4     10-07  Mg     2.10     10-07                            10.9   4.11  )-----------( 509      ( 07 Oct 2024 06:23 )             34.8          ANTHROPOMETRICS  Height (cm): 147.3 (09-29-24)  Weight (kg): 35 (09-29-24)  BMI (kg/m2): 16.1 (09-29-24)  IBW:   +/- 10%    Weight Assessment: per RN flowsheet in KG  Daily     Daily   % weight change :     Edema:    Skin:     ESTIMATED NEEDS ASSESSMENT  [  ] No change in need assessment    [  ] recalculated, weight Used:   Estimated Energy:  Kcal/kg/day (  kcal/kg)  Estimated Protein:  gm/kg/day (  gm/kg)    NUTRITION FOCUSED PHYSICAL EXAM  [  ] conducted  [  ] deferred  [  ] not applicable 2/2 previously conducted by RD;    Muscle wasting [  ] temporal [  ] clavicle [  ] shoulder [  ] scapula [  ] thigh [  ] calf [  ] dorsal hand    Fat Loss [  ] buccal [  ] orbital [  ] triceps [  ] ribs     PREVIOUS NUTRITION DIAGNOSIS  [  ] Severe/Moderate malnutrition  [  ] Increased Nutrient Needs  [  ] Altered Nutrition Related Labs  [  ] Unintentional Weight Loss  [  ] Inadequate Oral Intake  [  ] Inadequate Protein Energy Intake  [  ] Inadequate Energy Intake  [  ] Food and Nutrition Knowledge Deficit  [  ] Underweight/Morbid Obesity status   [  ] No active nutrition diagnosis at this time     Nutrition Diagnosis is : [  ] ongoing  [  ] resolved [  ] not applicable     New Nutrition Diagnosis : [  ] ongoing  [  ] resolved [  ] not applicable     EDUCATION  [  ] Given today        Type of education provided:   [  ] Given on previous assessment by RD   [  ] Not applicable 2/2 cognitive deficit  [  ] Pt refusal of education offered   [  ] Not applicable 2/2 current prognosis  [  ] Not warranted at present    RECOMMENDATION  [ x ] Continue present PO diet/EN order as prescribed  [ x ] Please consider adding  [ x ] Honor food and fluid preferences as able.      MONITORING AND EVALUATION   [ x ] Monitor PO intake/EN tolerance, skin integrity, bowel regimen, and nutrition pertinent labs.  [ x ] Tolerance to diet prescription [ x ] weights [ x ] follow up per protocol Nutrition Follow-Up Note         Source: Patient A&Ox 3   Family [ ]     RN /PCA [x ]    Chart [x ]     Pt seen for nutrition follow up due to severe malnutrition.    MEDICAL COURSE  Per chart review, Patient is a 69 y/o F with pmhx of chronic back pain, anxiety, depression, OA, IBS hx of suicide attempt presented to the ED for new onset suicidal ideations. Admit for psych eval and pain management of chronic back pain.    ACTIVE DIET ORDER  Diet, Pureed:   Lactose Restricted (Milk Sugar Intoler.)  Gluten-Gliadin Restricted  Ovo Vegetarian (Accepts Eggs) (10-05-24 @ 14:46)    NUTRITION COURSE  Patient seen in her room, PCA at bedside for 1 on 1 monitoring. Patient c/o multiple food intolerance 2/2 IBS. Per discussion with patient, she is able to eat fish and butter. Recommend to clarify diet order.  Food preferences and tolerance discussed today, prefers no milk, yogurt, orange juice, coffee, soy milk, almond milk, peas/beans, spices, and tomato sauce. Patient reported that she can tolerate such as swiss cheese and cheddar cheese. Denied any chewing or swallowing difficulties; on puree consistency per patient request. Patient s/p IBS on creon and bowel regimen 2/2 constipation with last BM reported 10/5 per RN flowsheet. Noted on FeSO4, Vit D for micronutrient support. Patient is visually observed with severe muscle/fat depletion. RDN to remain available for further nutrition intervention as indicated.       PERTINENT MEDICATION  MEDICATIONS  (STANDING):  calcium carbonate   1250 mG (OsCal) 1 Tablet(s) Oral two times a day  chlorhexidine 2% Cloths 1 Application(s) Topical daily  cholecalciferol 1000 Unit(s) Oral daily  ferrous    sulfate 325 milliGRAM(s) Oral daily  heparin   Injectable 5000 Unit(s) SubCutaneous every 12 hours  influenza  Vaccine (HIGH DOSE) 0.5 milliLiter(s) IntraMuscular once  lidocaine   4% Patch 1 Patch Transdermal daily  pancrelipase  (CREON  6,000 Lipase Units) 1 Capsule(s) Oral three times a day with meals  pregabalin 25 milliGRAM(s) Oral at bedtime    MEDICATIONS  (PRN):  acetaminophen     Tablet .. 650 milliGRAM(s) Oral every 6 hours PRN Temp greater or equal to 38C (100.4F), Mild Pain (1 - 3)  aluminum hydroxide/magnesium hydroxide/simethicone Suspension 30 milliLiter(s) Oral every 4 hours PRN Dyspepsia  diazepam    Tablet 5 milliGRAM(s) Oral two times a day PRN anxiety  melatonin 3 milliGRAM(s) Oral at bedtime PRN Insomnia  ondansetron Injectable 4 milliGRAM(s) IV Push every 8 hours PRN Nausea and/or Vomiting  oxyCODONE    Solution 2.5 milliGRAM(s) Oral every 8 hours PRN Moderate Pain (4 - 6)  oxyCODONE    Solution 5 milliGRAM(s) Oral every 8 hours PRN Severe Pain (7 - 10)  pancrelipase  (CREON  6,000 Lipase Units) 1 Capsule(s) Oral with dinner PRN Bedtime snack  polyethylene glycol 3350 17 Gram(s) Oral daily PRN Constipation  senna 2 Tablet(s) Oral daily PRN Constipation  simethicone 80 milliGRAM(s) Chew every 8 hours PRN Gas      PERTINENT LABS  10-07    135  |  97[L]  |  12  ----------------------------<  102[H]  4.5   |  26  |  0.42[L]    Ca    9.3      07 Oct 2024 06:23  Phos  4.4     10-07  Mg     2.10     10-07                            10.9   4.11  )-----------( 509      ( 07 Oct 2024 06:23 )             34.8      ANTHROPOMETRICS  Height (cm): 147.3 (09-29-24)  Weight (kg): 35 (09-29-24)  BMI (kg/m2): 16.1 (09-29-24)  IBW: 98 lbs/ 43.6kg +/- 10%    Weight Assessment: per RN flowsheet in KG  No new weight to assess, per initial assessment, patient weighed 65 lbs/30kg.  % weight change : n/a    Edema: No edema per RN flowsheets     Skin: intact    ESTIMATED NEEDS ASSESSMENT  [ x ] No change in need assessment,   [ x ] recalculated via IBW 43.6kg   Estimated Energy: 1308- 1526 Kcal/kg/day ( 30-35 kcal/kg)  Estimated Protein: 52.3-61 gm/kg/day (1.2-1.4 gm/kg)    PREVIOUS NUTRITION DIAGNOSIS  [ x ] Severe malnutrition    Nutrition Diagnosis is : [ x ] ongoing     New Nutrition Diagnosis :  [ x ] not applicable     EDUCATION  [ x ] Not applicable 2/2 current prognosis      RECOMMENDATION  [ x ] Recommend lactose free, gluten restricted diet order, continue puree diet consistency per medical discretion  [ x ] Please consider adding acidophilus to promote gut health   [ x ] Honor food and fluid preferences as able. RD to manually enter food and fluid preferences      MONITORING AND EVALUATION   [ x ] Monitor PO intake and tolerance, skin integrity, bowel regimen, and nutrition pertinent labs.  [ x ] Tolerance to diet prescription [ x ] weights [ x ] follow up per protocol Nutrition Follow-Up Note         Source: Patient A&Ox 3   Family [ ]     RN /PCA [x ]    Chart [x ]     Pt seen for nutrition follow up due to severe malnutrition.    MEDICAL COURSE  Per chart review, Patient is a 67 y/o F with pmhx of chronic back pain, anxiety, depression, OA, IBS hx of suicide attempt presented to the ED for new onset suicidal ideations. Admit for psych eval and pain management of chronic back pain.    ACTIVE DIET ORDER  Diet, Pureed:   Lactose Restricted (Milk Sugar Intoler.)  Gluten-Gliadin Restricted  Ovo Vegetarian (Accepts Eggs) (10-05-24 @ 14:46)    NUTRITION COURSE  Patient seen in her room, PCA at bedside for 1 on 1 monitoring. Patient c/o multiple food intolerance 2/2 IBS. Per discussion with patient, she is able to eat fish and butter. Recommend to clarify diet order.  Food preferences and tolerance discussed today, prefers no milk, yogurt, orange juice, coffee, soy milk, almond milk, peas/beans, spices, and tomato sauce. Patient reported that she can tolerate such as swiss cheese and cheddar cheese. Denied any chewing or swallowing difficulties; on puree consistency per patient request. Patient s/p IBS on creon and bowel regimen 2/2 constipation with last BM reported 10/5 per RN flowsheet. Noted on FeSO4, Vit D for micronutrient support. Patient is visually observed with severe muscle/fat depletion. RDN to remain available for further nutrition intervention as indicated.       PERTINENT MEDICATION  MEDICATIONS  (STANDING):  calcium carbonate   1250 mG (OsCal) 1 Tablet(s) Oral two times a day  chlorhexidine 2% Cloths 1 Application(s) Topical daily  cholecalciferol 1000 Unit(s) Oral daily  ferrous    sulfate 325 milliGRAM(s) Oral daily  heparin   Injectable 5000 Unit(s) SubCutaneous every 12 hours  influenza  Vaccine (HIGH DOSE) 0.5 milliLiter(s) IntraMuscular once  lidocaine   4% Patch 1 Patch Transdermal daily  pancrelipase  (CREON  6,000 Lipase Units) 1 Capsule(s) Oral three times a day with meals  pregabalin 25 milliGRAM(s) Oral at bedtime    MEDICATIONS  (PRN):  acetaminophen     Tablet .. 650 milliGRAM(s) Oral every 6 hours PRN Temp greater or equal to 38C (100.4F), Mild Pain (1 - 3)  aluminum hydroxide/magnesium hydroxide/simethicone Suspension 30 milliLiter(s) Oral every 4 hours PRN Dyspepsia  diazepam    Tablet 5 milliGRAM(s) Oral two times a day PRN anxiety  melatonin 3 milliGRAM(s) Oral at bedtime PRN Insomnia  ondansetron Injectable 4 milliGRAM(s) IV Push every 8 hours PRN Nausea and/or Vomiting  oxyCODONE    Solution 2.5 milliGRAM(s) Oral every 8 hours PRN Moderate Pain (4 - 6)  oxyCODONE    Solution 5 milliGRAM(s) Oral every 8 hours PRN Severe Pain (7 - 10)  pancrelipase  (CREON  6,000 Lipase Units) 1 Capsule(s) Oral with dinner PRN Bedtime snack  polyethylene glycol 3350 17 Gram(s) Oral daily PRN Constipation  senna 2 Tablet(s) Oral daily PRN Constipation  simethicone 80 milliGRAM(s) Chew every 8 hours PRN Gas      PERTINENT LABS  10-07    135  |  97[L]  |  12  ----------------------------<  102[H]  4.5   |  26  |  0.42[L]    Ca    9.3      07 Oct 2024 06:23  Phos  4.4     10-07  Mg     2.10     10-07                            10.9   4.11  )-----------( 509      ( 07 Oct 2024 06:23 )             34.8      ANTHROPOMETRICS  Height (cm): 147.3 (09-29-24)  Weight (kg): 35 (09-29-24)  BMI (kg/m2): 16.1 (09-29-24)  IBW: 98 lbs/ 43.6kg +/- 10%    Weight Assessment: per RN flowsheet in KG  No new weight to assess, per initial assessment, patient weighed 65 lbs/30kg.  % weight change : n/a    Edema: No edema per RN flowsheets     Skin: intact    ESTIMATED NEEDS ASSESSMENT  [ x ] No change in need assessment,   [ x ] recalculated via IBW 43.6kg   Estimated Energy: 1308- 1526 Kcal/kg/day ( 30-35 kcal/kg)  Estimated Protein: 52.3-61 gm/kg/day (1.2-1.4 gm/kg)    PREVIOUS NUTRITION DIAGNOSIS  [ x ] Severe malnutrition    Nutrition Diagnosis is : [ x ] ongoing     New Nutrition Diagnosis :  [ x ] not applicable     EDUCATION  [ x ] Not applicable 2/2 current prognosis      RECOMMENDATION  [ x ] Recommend lactose free, gluten restricted diet order, continue puree diet consistency per medical discretion  [ x ] Please consider adding acidophilus to promote gut health   [ x ] Honor food and fluid preferences as able. RD to manually enter food and fluid preferences  [ x ] Please consider alternative mean of nutrition and hydration (PN) if PO unable to optimize    MONITORING AND EVALUATION   [ x ] Monitor PO intake and tolerance, skin integrity, bowel regimen, and nutrition pertinent labs.  [ x ] Tolerance to diet prescription [ x ] weights [ x ] follow up per protocol

## 2024-10-07 NOTE — PROGRESS NOTE ADULT - ASSESSMENT
67 y/o F with pmhx of chronic back pain, anxiety, depression, OA, IBS hx of suicide attempt presented to the ED for new onset suicidal ideations. Admit for psych eval and pain management of chronic back pain.    f/u psych

## 2024-10-07 NOTE — PROGRESS NOTE ADULT - PROBLEM SELECTOR PLAN 9
- heparin subq for dvt ppx      awaiting [facility] [place] [facility] bed  Plan discussed with ACP

## 2024-10-07 NOTE — PROGRESS NOTE ADULT - PROBLEM SELECTOR PLAN 3
- patient with chronic back pain, poor follow up outpatient  - pain management consult appreciated, pt not getting sustained relief from opioids and experiencing drowsiness -- will minimize  - patient cannot lay flat for CT scan or MRI studies, refused c/t/l spine xrays as well  - cont lidocaine patch  - cont lyrica 25mg QD for neuropathic pain -- pt not currently agreeable to titrating up  - PT eval- no skilled PT needs  - pt with h/o Osteroporosis, has tried meds in the past which did not improve her symptoms, endocrine consulted  started on vit d and ca, Per endo Pt would benefit from anabolic agent. Given severely low T score of hip, Evenity preferred over tymlos/forteo, though nonvertebral/vertebral benefit remains the same. Medication to be discussed and started as outpatient basis, along with DXA scan. Patient prefers to follow up with rheumatology clinic.  - vit d level low - patient with chronic back pain, poor follow up outpatient  - pain management consult appreciated, pt not getting sustained relief from opioids and experiencing drowsiness -- will minimize  - patient cannot lay flat for CT scan or MRI studies, refused c/t/l spine xrays as well  - cont lidocaine patch  - cont lyrica 25mg QD for neuropathic pain -- pt not currently agreeable to titrating up  - PT eval- no skilled PT needs  - pt with h/o Osteroporosis, has tried meds in the past which did not improve her symptoms, endocrine consulted  started on vit d and ca, Per endo Pt would benefit from anabolic agent. Given severely low T score of hip, Evenity preferred over tymlos/forteo, though nonvertebral/vertebral benefit remains the same. Medication to be discussed and started as outpatient basis, along with DXA scan. Patient prefers to follow up with rheumatology clinic.  - vit d level low  10/7 chronic pain to see [id number]

## 2024-10-07 NOTE — PROVIDER CONTACT NOTE (OTHER) - REASON
Patient Refusing Heparin & Compression Sequential Devices
patient refusing 
Pt refused PIV change per protocol

## 2024-10-07 NOTE — PROGRESS NOTE ADULT - SUBJECTIVE AND OBJECTIVE BOX
Follow up consult for Chronic Pain Management     SUBJECTIVE:  The patient states that she has been having more back pain and when she asks for pain medications like Oxycodone 2.5mg, the nurse tells her she is not due for it yet.  The patient states that she is having worsening, generalized back pain that she describes as burning radiating to her right buttock.  Patient refuses a lidocaine patch because its cold and makes her itchy.  The patient verbalized that she does take Oxycodone liquid 1ml = 1mg every 4 hours PRN, and Lyrica 25mg at bedtime for sleep? at home prior to admission.  The patient would like to try Lyrica 25 mg TID and Oxycodone liquid 2.5mg every 4 hours PRN for pain.    		  OBJECTIVE:  Patient is sitting up in bed, cachetic and kyphotic with spine protruding and abdomen round and appear distended due to her anatomy.     Pain Score:  20/10 (X) Refer to pain scores    Therapy:	[ ] IV PCA	[ ] Epidural   [ ] s/p Spinal Opioid	[ ] Peripheral nerve block  (x) PRN Oral/IV opioids and or Adjuvant non-opioid medications  	  Vital Signs Last 24 Hrs  T(C): 36.3 (07 Oct 2024 11:30), Max: 36.7 (06 Oct 2024 13:50)  T(F): 97.4 (07 Oct 2024 11:30), Max: 98 (06 Oct 2024 13:50)  HR: 73 (07 Oct 2024 11:30) (64 - 77)  BP: 95/62 (07 Oct 2024 11:30) (95/62 - 108/71)  BP(mean): --  RR: 18 (07 Oct 2024 11:30) (17 - 18)  SpO2: 96% (07 Oct 2024 11:30) (96% - 98%)    Parameters below as of 07 Oct 2024 11:30  Patient On (Oxygen Delivery Method): room air        ( x) Alert & Oriented     ( ) No motor/sensory block     ( ) Nausea     ( ) Pruritis     ( ) Headache    ASSESSMENT/ PLAN    Therapy to be:	[x ] Continue   [ ] Discontinued      Documentation and Verification of current medications:   [X] Done	[ ] Not done, not eligible    Comments:   Patient's pain is not controlled with current pain regimen.     Recommend:  EMR checked, patient has been refusing pain medications or not getting it.  She has had the only 1 to 2 opioids a day, and just started Lyrica 25mg last night even though it was ordered since 10/3.   -Recommend provider to RN written stating:  RN to reassess patient’s pain q 4 hours  and giving appropriate medications as needed.   -Consider discontinuing Oxycodone 5mg and Lidocaine patch.  -Consider changing Oxycodone order to Oxycodone 2.5mg solution via oral every 6 hours PRN for moderate to severe pain.  Hold for oversedation.  Not to be given within 1 hour of any other immediate acting opioid, benzodiazepine or sedating medication.  -Consider changing Lyrica order to Lyrica 25mg every 8 hours (6am, 2pm, 8pm).  Hold for oversedation.  -Consider changing Acetm ordering Acetaminophen solution 500mg every 8 hours standing x 2 days, then PRN for pain. Do not give within 8 hours of last dose of Acetaminophen.  -Continue with Physical therapy.  -Consider seq teds while in bed most of the day.  -Maintain continuous pulse oximetry  -Recommend follow up with pain management outpatient if necessary.  May acquire outpatient pain management doctor through insurance carrier.   -Discussed patient with outpatient pain management physician, Dr. ORIN Carl, who agrees with the above recommendations.     Pain service to sign off, no further recommendations for pain medications, at this time.  May call pain service if needed.    Progress Note written now but Patient was seen earlier.

## 2024-10-07 NOTE — PROVIDER CONTACT NOTE (OTHER) - SITUATION
Pt refused PIV change per protocol. IV flushes well. no s/s of complications noted. Provider made aware
Patient refusing morning heparin shot and compression sequential devices even after education. Patient verbalized understanding with teachback method, yet still refused.
patient refusing

## 2024-10-07 NOTE — PROVIDER CONTACT NOTE (OTHER) - ASSESSMENT
patient A&Ox4
Patient is AOx4. Patient is depressed and shows a lack of motivation to get better. Patient refuses most recommendations from healthcare team.

## 2024-10-08 ENCOUNTER — INPATIENT (INPATIENT)
Facility: HOSPITAL | Age: 69
LOS: 16 days | Discharge: HOME | End: 2024-10-25
Attending: STUDENT IN AN ORGANIZED HEALTH CARE EDUCATION/TRAINING PROGRAM | Admitting: PSYCHIATRY & NEUROLOGY
Payer: COMMERCIAL

## 2024-10-08 ENCOUNTER — TRANSCRIPTION ENCOUNTER (OUTPATIENT)
Age: 69
End: 2024-10-08

## 2024-10-08 VITALS
HEART RATE: 85 BPM | OXYGEN SATURATION: 97 % | SYSTOLIC BLOOD PRESSURE: 110 MMHG | DIASTOLIC BLOOD PRESSURE: 72 MMHG | TEMPERATURE: 98 F

## 2024-10-08 VITALS
OXYGEN SATURATION: 99 % | RESPIRATION RATE: 18 BRPM | SYSTOLIC BLOOD PRESSURE: 98 MMHG | DIASTOLIC BLOOD PRESSURE: 62 MMHG | TEMPERATURE: 97 F | HEART RATE: 63 BPM

## 2024-10-08 DIAGNOSIS — F32.9 MAJOR DEPRESSIVE DISORDER, SINGLE EPISODE, UNSPECIFIED: ICD-10-CM

## 2024-10-08 PROCEDURE — 99221 1ST HOSP IP/OBS SF/LOW 40: CPT

## 2024-10-08 PROCEDURE — 99239 HOSP IP/OBS DSCHRG MGMT >30: CPT

## 2024-10-08 RX ORDER — ACETAMINOPHEN 500 MG
650 TABLET ORAL EVERY 6 HOURS
Refills: 0 | Status: DISCONTINUED | OUTPATIENT
Start: 2024-10-08 | End: 2024-10-08

## 2024-10-08 RX ORDER — OXYCODONE HYDROCHLORIDE 30 MG/1
2.5 TABLET ORAL EVERY 6 HOURS
Refills: 0 | Status: DISCONTINUED | OUTPATIENT
Start: 2024-10-08 | End: 2024-10-14

## 2024-10-08 RX ORDER — MUPIROCIN 20 MG/G
1 OINTMENT TOPICAL
Qty: 0 | Refills: 0 | DISCHARGE
Start: 2024-10-08

## 2024-10-08 RX ORDER — PANCRELIPASE 16800; 98400; 56800 [USP'U]/1; [USP'U]/1; [USP'U]/1
2 CAPSULE, DELAYED RELEASE ORAL
Refills: 0 | Status: DISCONTINUED | OUTPATIENT
Start: 2024-10-08 | End: 2024-10-15

## 2024-10-08 RX ORDER — PANCRELIPASE 16800; 98400; 56800 [USP'U]/1; [USP'U]/1; [USP'U]/1
1 CAPSULE, DELAYED RELEASE ORAL
Refills: 0 | Status: DISCONTINUED | OUTPATIENT
Start: 2024-10-08 | End: 2024-10-08

## 2024-10-08 RX ORDER — PANCRELIPASE 8000; 30250; 28750 [USP'U]/1; [USP'U]/1; [USP'U]/1
1 CAPSULE, DELAYED RELEASE ORAL
Refills: 0 | DISCHARGE

## 2024-10-08 RX ORDER — 5-HYDROXYTRYPTOPHAN (5-HTP) 100 MG
1 TABLET,DISINTEGRATING ORAL
Qty: 0 | Refills: 0 | DISCHARGE
Start: 2024-10-08

## 2024-10-08 RX ORDER — LORAZEPAM 2 MG
1 TABLET ORAL ONCE
Refills: 0 | Status: DISCONTINUED | OUTPATIENT
Start: 2024-10-08 | End: 2024-10-08

## 2024-10-08 RX ORDER — MELATONIN 5 MG
3 TABLET ORAL AT BEDTIME
Refills: 0 | Status: DISCONTINUED | OUTPATIENT
Start: 2024-10-08 | End: 2024-10-21

## 2024-10-08 RX ORDER — CLONAZEPAM 1 MG
1 TABLET ORAL
Refills: 0 | DISCHARGE

## 2024-10-08 RX ORDER — SENNOSIDES 8.6 MG
2 TABLET ORAL
Qty: 0 | Refills: 0 | DISCHARGE
Start: 2024-10-08

## 2024-10-08 RX ORDER — PANCRELIPASE 8000; 30250; 28750 [USP'U]/1; [USP'U]/1; [USP'U]/1
1 CAPSULE, DELAYED RELEASE ORAL
Qty: 0 | Refills: 0 | DISCHARGE
Start: 2024-10-08

## 2024-10-08 RX ORDER — HALOPERIDOL DECANOATE 50 MG/ML
1 INJECTION INTRAMUSCULAR ONCE
Refills: 0 | Status: DISCONTINUED | OUTPATIENT
Start: 2024-10-08 | End: 2024-10-14

## 2024-10-08 RX ORDER — DIAZEPAM 10 MG/1
1 TABLET ORAL
Qty: 0 | Refills: 0 | DISCHARGE
Start: 2024-10-08

## 2024-10-08 RX ORDER — MAGNESIUM, ALUMINUM HYDROXIDE 200-200 MG
30 TABLET,CHEWABLE ORAL EVERY 4 HOURS
Refills: 0 | Status: DISCONTINUED | OUTPATIENT
Start: 2024-10-08 | End: 2024-10-25

## 2024-10-08 RX ORDER — CRANBERRY FRUIT EXTRACT 650 MG
1000 CAPSULE ORAL
Qty: 0 | Refills: 0 | DISCHARGE
Start: 2024-10-08

## 2024-10-08 RX ORDER — ACETAMINOPHEN 500 MG
650 TABLET ORAL EVERY 6 HOURS
Refills: 0 | Status: DISCONTINUED | OUTPATIENT
Start: 2024-10-08 | End: 2024-10-25

## 2024-10-08 RX ORDER — ACETAMINOPHEN 325 MG
500 TABLET ORAL EVERY 8 HOURS
Refills: 0 | Status: CANCELLED | OUTPATIENT
Start: 2024-10-10 | End: 2024-10-08

## 2024-10-08 RX ORDER — PANCRELIPASE 16800; 98400; 56800 [USP'U]/1; [USP'U]/1; [USP'U]/1
6 CAPSULE, DELAYED RELEASE ORAL
Refills: 0 | Status: DISCONTINUED | OUTPATIENT
Start: 2024-10-08 | End: 2024-10-11

## 2024-10-08 RX ORDER — OXYCODONE HYDROCHLORIDE 30 MG/1
1 TABLET, FILM COATED, EXTENDED RELEASE ORAL
Refills: 0 | DISCHARGE

## 2024-10-08 RX ORDER — CHLORHEXIDINE GLUCONATE 40 MG/ML
1 SOLUTION TOPICAL DAILY
Refills: 0 | Status: DISCONTINUED | OUTPATIENT
Start: 2024-10-08 | End: 2024-10-08

## 2024-10-08 RX ORDER — LORAZEPAM 2 MG
1 TABLET ORAL ONCE
Refills: 0 | Status: DISCONTINUED | OUTPATIENT
Start: 2024-10-08 | End: 2024-10-15

## 2024-10-08 RX ORDER — MAG HYDROX/ALUMINUM HYD/SIMETH 200-200-20
30 SUSPENSION, ORAL (FINAL DOSE FORM) ORAL
Qty: 0 | Refills: 0 | DISCHARGE
Start: 2024-10-08

## 2024-10-08 RX ORDER — SENNOSIDES 8.6 MG
0 TABLET ORAL
Refills: 0 | DISCHARGE

## 2024-10-08 RX ORDER — PANCRELIPASE 16800; 98400; 56800 [USP'U]/1; [USP'U]/1; [USP'U]/1
2 CAPSULE, DELAYED RELEASE ORAL
Refills: 0 | Status: DISCONTINUED | OUTPATIENT
Start: 2024-10-08 | End: 2024-10-08

## 2024-10-08 RX ORDER — OXYCODONE HYDROCHLORIDE 30 MG/1
2.5 TABLET ORAL EVERY 6 HOURS
Refills: 0 | Status: DISCONTINUED | OUTPATIENT
Start: 2024-10-08 | End: 2024-10-08

## 2024-10-08 RX ORDER — OXYCODONE HYDROCHLORIDE 30 MG/1
2.5 TABLET, FILM COATED, EXTENDED RELEASE ORAL
Qty: 0 | Refills: 0 | DISCHARGE
Start: 2024-10-08

## 2024-10-08 RX ORDER — ACETAMINOPHEN 325 MG
2 TABLET ORAL
Qty: 0 | Refills: 0 | DISCHARGE

## 2024-10-08 RX ORDER — POLYETHYLENE GLYCOL 3350 17 G/17G
17 POWDER, FOR SOLUTION ORAL DAILY
Refills: 0 | Status: DISCONTINUED | OUTPATIENT
Start: 2024-10-08 | End: 2024-10-13

## 2024-10-08 RX ORDER — ANTACID TABLETS 500 MG/1
1 TABLET, CHEWABLE ORAL
Qty: 0 | Refills: 0 | DISCHARGE
Start: 2024-10-08

## 2024-10-08 RX ORDER — CRANBERRY FRUIT EXTRACT 650 MG
2 CAPSULE ORAL
Refills: 0 | DISCHARGE

## 2024-10-08 RX ORDER — CHOLECALCIFEROL (VITAMIN D3) 625 MCG
1000 CAPSULE ORAL DAILY
Refills: 0 | Status: DISCONTINUED | OUTPATIENT
Start: 2024-10-08 | End: 2024-10-25

## 2024-10-08 RX ORDER — OXYCODONE AND ACETAMINOPHEN 7.5; 325 MG/1; MG/1
1 TABLET ORAL EVERY 6 HOURS
Refills: 0 | Status: DISCONTINUED | OUTPATIENT
Start: 2024-10-08 | End: 2024-10-08

## 2024-10-08 RX ORDER — DIAZEPAM 10 MG/1
5 FILM BUCCAL
Refills: 0 | Status: DISCONTINUED | OUTPATIENT
Start: 2024-10-08 | End: 2024-10-12

## 2024-10-08 RX ORDER — OXYCODONE HYDROCHLORIDE 30 MG/1
2.5 TABLET, FILM COATED, EXTENDED RELEASE ORAL EVERY 6 HOURS
Refills: 0 | Status: DISCONTINUED | OUTPATIENT
Start: 2024-10-08 | End: 2024-10-08

## 2024-10-08 RX ORDER — FERROUS SULFATE 325(65) MG
325 TABLET ORAL DAILY
Refills: 0 | Status: DISCONTINUED | OUTPATIENT
Start: 2024-10-08 | End: 2024-10-25

## 2024-10-08 RX ORDER — SIMETHICONE 80 MG/1
80 TABLET, CHEWABLE ORAL EVERY 8 HOURS
Refills: 0 | Status: DISCONTINUED | OUTPATIENT
Start: 2024-10-08 | End: 2024-10-25

## 2024-10-08 RX ORDER — SENNA 187 MG
2 TABLET ORAL DAILY
Refills: 0 | Status: DISCONTINUED | OUTPATIENT
Start: 2024-10-08 | End: 2024-10-09

## 2024-10-08 RX ORDER — ANTACID TABLETS 500 MG/1
1 TABLET, CHEWABLE ORAL
Refills: 0 | DISCHARGE

## 2024-10-08 RX ORDER — FERROUS SULFATE 325(65) MG
1 TABLET ORAL
Qty: 0 | Refills: 0 | DISCHARGE
Start: 2024-10-08

## 2024-10-08 RX ORDER — MULTI VITAMIN/MINERAL SUPPLEMENT WITH ASCORBIC ACID, NIACIN, PYRIDOXINE, PANTOTHENIC ACID, FOLIC ACID, RIBOFLAVIN, THIAMIN, BIOTIN, COBALAMIN AND ZINC. 60; 20; 12.5; 10; 10; 1.7; 1.5; 1; .3; .006 MG/1; MG/1; MG/1; MG/1; MG/1; MG/1; MG/1; MG/1; MG/1; MG/1
1 TABLET, COATED ORAL
Refills: 0 | DISCHARGE

## 2024-10-08 RX ORDER — PREGABALIN 25 MG/1
1 CAPSULE ORAL
Qty: 0 | Refills: 0 | DISCHARGE
Start: 2024-10-08

## 2024-10-08 RX ORDER — MUPIROCIN 20 MG/G
1 OINTMENT TOPICAL
Refills: 0 | Status: DISCONTINUED | OUTPATIENT
Start: 2024-10-08 | End: 2024-10-08

## 2024-10-08 RX ORDER — LORAZEPAM 2 MG
1 TABLET ORAL EVERY 6 HOURS
Refills: 0 | Status: DISCONTINUED | OUTPATIENT
Start: 2024-10-08 | End: 2024-10-12

## 2024-10-08 RX ORDER — ACETAMINOPHEN 325 MG
500 TABLET ORAL EVERY 8 HOURS
Refills: 0 | Status: DISCONTINUED | OUTPATIENT
Start: 2024-10-08 | End: 2024-10-08

## 2024-10-08 RX ORDER — HALOPERIDOL DECANOATE 50 MG/ML
1 INJECTION INTRAMUSCULAR EVERY 6 HOURS
Refills: 0 | Status: DISCONTINUED | OUTPATIENT
Start: 2024-10-08 | End: 2024-10-14

## 2024-10-08 RX ADMIN — Medication 650 MILLIGRAM(S): at 01:35

## 2024-10-08 RX ADMIN — Medication 650 MILLIGRAM(S): at 06:55

## 2024-10-08 RX ADMIN — OXYCODONE HYDROCHLORIDE 2.5 MILLIGRAM(S): 30 TABLET ORAL at 19:43

## 2024-10-08 RX ADMIN — PREGABALIN 25 MILLIGRAM(S): 25 CAPSULE ORAL at 06:55

## 2024-10-08 RX ADMIN — Medication 325 MILLIGRAM(S): at 12:58

## 2024-10-08 RX ADMIN — Medication 650 MILLIGRAM(S): at 22:50

## 2024-10-08 RX ADMIN — MUPIROCIN 1 APPLICATION(S): 20 OINTMENT TOPICAL at 07:48

## 2024-10-08 RX ADMIN — OXYCODONE HYDROCHLORIDE 2.5 MILLIGRAM(S): 30 TABLET, FILM COATED, EXTENDED RELEASE ORAL at 12:57

## 2024-10-08 RX ADMIN — PANCRELIPASE 1 CAPSULE(S): 8000; 30250; 28750 CAPSULE, DELAYED RELEASE ORAL at 10:12

## 2024-10-08 RX ADMIN — PREGABALIN 25 MILLIGRAM(S): 25 CAPSULE ORAL at 14:24

## 2024-10-08 RX ADMIN — DIAZEPAM 5 MILLIGRAM(S): 10 TABLET ORAL at 01:35

## 2024-10-08 RX ADMIN — Medication 17 GRAM(S): at 09:21

## 2024-10-08 RX ADMIN — Medication 1000 UNIT(S): at 13:00

## 2024-10-08 RX ADMIN — PANCRELIPASE 1 CAPSULE(S): 8000; 30250; 28750 CAPSULE, DELAYED RELEASE ORAL at 02:08

## 2024-10-08 RX ADMIN — CHLORHEXIDINE GLUCONATE ORAL RINSE 1 APPLICATION(S): 1.2 SOLUTION DENTAL at 14:05

## 2024-10-08 RX ADMIN — ANTACID TABLETS 1 TABLET(S): 500 TABLET, CHEWABLE ORAL at 09:13

## 2024-10-08 RX ADMIN — Medication 3 MILLIGRAM(S): at 22:50

## 2024-10-08 RX ADMIN — PANCRELIPASE 1 CAPSULE(S): 8000; 30250; 28750 CAPSULE, DELAYED RELEASE ORAL at 13:18

## 2024-10-08 NOTE — BH INPATIENT PSYCHIATRY ASSESSMENT NOTE - NSTREATMENTCERTY_PSY_ALL_CORE
HIV: reactive
That inpatient services furnished since the previous certification or recertification were, and continue to be required: for treatment that could reasonably be expected to improve the patient's condition; or, for diagnostic study;    That the hospital records show that the services furnished were: intensive treatment services; admission and related services necessary for diagnostic study or equivalent services;    That the patient continues to need, on a daily basis active inpatient treatment furnished directly by or requiring the supervision of inpatient psychiatric facility personnel.

## 2024-10-08 NOTE — BH INPATIENT PSYCHIATRY ASSESSMENT NOTE - NSSUICPROTFACT_PSY_ALL_CORE
Identifies reasons for living/Supportive social network of family or friends/Positive therapeutic relationships/Christian beliefs/Beloved pets

## 2024-10-08 NOTE — BH INPATIENT PSYCHIATRY ASSESSMENT NOTE - PATIENT'S CHIEF COMPLAINT
"I am just in a lot of excruciating pain all of the time and just want it to end" "I took an overdose because of my pain, you can't help me"

## 2024-10-08 NOTE — PROGRESS NOTE ADULT - PROBLEM SELECTOR PROBLEM 4
IBS (irritable bowel syndrome)

## 2024-10-08 NOTE — BH CONSULTATION LIAISON PROGRESS NOTE - NSBHCHARTREVIEWLAB_PSY_A_CORE FT
INTERVAL LAB RESULTS:                        10.9   4.11  )-----------( 509      ( 07 Oct 2024 06:23 )             34.8                               135    |  97     |  12                  Calcium: 9.3   / iCa: x      (10-07 @ 06:23)    ----------------------------<  102       Magnesium: 2.10                             4.5     |  26     |  0.42             Phosphorous: 4.4        Urinalysis Basic - ( 07 Oct 2024 06:23 )    Color: x / Appearance: x / SG: x / pH: x  Gluc: 102 mg/dL / Ketone: x  / Bili: x / Urobili: x   Blood: x / Protein: x / Nitrite: x   Leuk Esterase: x / RBC: x / WBC x   Sq Epi: x / Non Sq Epi: x / Bacteria: x        INTERVAL IMAGING STUDIES:  
  INTERVAL LAB RESULTS:                        10.9   4.11  )-----------( 509      ( 07 Oct 2024 06:23 )             34.8                               135    |  97     |  12                  Calcium: 9.3   / iCa: x      (10-07 @ 06:23)    ----------------------------<  102       Magnesium: 2.10                             4.5     |  26     |  0.42             Phosphorous: 4.4        Urinalysis Basic - ( 07 Oct 2024 06:23 )    Color: x / Appearance: x / SG: x / pH: x  Gluc: 102 mg/dL / Ketone: x  / Bili: x / Urobili: x   Blood: x / Protein: x / Nitrite: x   Leuk Esterase: x / RBC: x / WBC x   Sq Epi: x / Non Sq Epi: x / Bacteria: x        INTERVAL IMAGING STUDIES:

## 2024-10-08 NOTE — BH CONSULTATION LIAISON PROGRESS NOTE - NSBHFUPINTERVALCCFT_PSY_A_CORE
"My pain is better but I am nervous about going to [place]"
"I am in a lot of pain"
"the valium is still helping and I am learning some coping skills"
"I am in severe pain"
"The valium is helping a lot"
"I wish I didn't have to live like this"

## 2024-10-08 NOTE — BH CONSULTATION LIAISON PROGRESS NOTE - NSBHATTESTTYPEVISIT_PSY_A_CORE
Attending Only
Attending with Resident/Fellow/Student
Resident/Fellow with telephonic supervision
Attending with Resident/Fellow/Student

## 2024-10-08 NOTE — PROGRESS NOTE ADULT - PROBLEM SELECTOR PROBLEM 6
Vitamin D deficiency
Medication management
Anemia
Vitamin D deficiency

## 2024-10-08 NOTE — PROGRESS NOTE ADULT - PROBLEM SELECTOR PROBLEM 1
Suicidal ideation
Suicidal ideation
Osteoporosis
Suicidal ideation

## 2024-10-08 NOTE — BH CONSULTATION LIAISON PROGRESS NOTE - NSBHCHARTREVIEWVS_PSY_A_CORE FT
Vital Signs Last 24 Hrs  T(C): 36.2 (08 Oct 2024 11:24), Max: 36.3 (07 Oct 2024 20:20)  T(F): 97.1 (08 Oct 2024 11:24), Max: 97.4 (07 Oct 2024 20:20)  HR: 63 (08 Oct 2024 11:24) (63 - 77)  BP: 98/62 (08 Oct 2024 11:24) (96/62 - 103/57)  BP(mean): --  RR: 18 (08 Oct 2024 11:24) (18 - 18)  SpO2: 99% (08 Oct 2024 11:24) (95% - 99%)    Parameters below as of 08 Oct 2024 11:24  Patient On (Oxygen Delivery Method): room air    
Vital Signs Last 24 Hrs  T(C): 36.3 (07 Oct 2024 11:30), Max: 36.4 (06 Oct 2024 18:10)  T(F): 97.4 (07 Oct 2024 11:30), Max: 97.6 (06 Oct 2024 18:10)  HR: 73 (07 Oct 2024 11:30) (64 - 74)  BP: 95/62 (07 Oct 2024 11:30) (95/62 - 108/71)  BP(mean): --  RR: 18 (07 Oct 2024 11:30) (17 - 18)  SpO2: 96% (07 Oct 2024 11:30) (96% - 98%)    Parameters below as of 07 Oct 2024 11:30  Patient On (Oxygen Delivery Method): room air    
Vital Signs Last 24 Hrs  T(C): 36.4 (04 Oct 2024 11:33), Max: 36.6 (04 Oct 2024 05:07)  T(F): 97.5 (04 Oct 2024 11:33), Max: 97.9 (04 Oct 2024 05:07)  HR: 73 (04 Oct 2024 11:33) (68 - 77)  BP: 102/68 (04 Oct 2024 11:33) (102/68 - 113/74)  BP(mean): --  RR: 18 (04 Oct 2024 11:33) (16 - 18)  SpO2: 97% (04 Oct 2024 11:33) (93% - 97%)    Parameters below as of 04 Oct 2024 11:33  Patient On (Oxygen Delivery Method): room air    
Vital Signs Last 24 Hrs  T(C): 36.5 (03 Oct 2024 12:38), Max: 36.6 (02 Oct 2024 21:21)  T(F): 97.7 (03 Oct 2024 12:38), Max: 97.8 (02 Oct 2024 21:21)  HR: 77 (03 Oct 2024 12:38) (65 - 77)  BP: 104/68 (03 Oct 2024 12:38) (101/67 - 110/71)  BP(mean): --  RR: 18 (03 Oct 2024 12:38) (17 - 18)  SpO2: 93% (03 Oct 2024 12:38) (93% - 99%)    Parameters below as of 03 Oct 2024 12:38  Patient On (Oxygen Delivery Method): room air    
Vital Signs Last 24 Hrs  T(C): 36.5 (02 Oct 2024 13:14), Max: 36.6 (01 Oct 2024 16:05)  T(F): 97.7 (02 Oct 2024 13:14), Max: 97.8 (01 Oct 2024 16:05)  HR: 71 (02 Oct 2024 13:14) (66 - 83)  BP: 107/65 (02 Oct 2024 13:14) (102/67 - 115/73)  BP(mean): --  RR: 17 (02 Oct 2024 13:14) (17 - 18)  SpO2: 100% (02 Oct 2024 13:14) (96% - 100%)    Parameters below as of 02 Oct 2024 13:14  Patient On (Oxygen Delivery Method): room air    
Vital Signs Last 24 Hrs  T(C): 36.6 (01 Oct 2024 16:05), Max: 37 (30 Sep 2024 20:53)  T(F): 97.8 (01 Oct 2024 16:05), Max: 98.6 (30 Sep 2024 20:53)  HR: 83 (01 Oct 2024 16:05) (70 - 84)  BP: 115/73 (01 Oct 2024 16:05) (109/67 - 116/81)  BP(mean): --  RR: 18 (01 Oct 2024 16:05) (18 - 18)  SpO2: 96% (01 Oct 2024 16:05) (95% - 98%)    Parameters below as of 01 Oct 2024 16:05  Patient On (Oxygen Delivery Method): room air

## 2024-10-08 NOTE — BH INPATIENT PSYCHIATRY ASSESSMENT NOTE - NSBHCHARTREVIEWVS_PSY_A_CORE FT
Vital Signs Last 24 Hrs  T(C): 36.2 (10-08-24 @ 11:24), Max: 36.3 (10-07-24 @ 20:20)  T(F): 97.1 (10-08-24 @ 11:24), Max: 97.4 (10-07-24 @ 20:20)  HR: 63 (10-08-24 @ 11:24) (63 - 77)  BP: 98/62 (10-08-24 @ 11:24) (96/62 - 103/57)  BP(mean): --  RR: 18 (10-08-24 @ 11:24) (18 - 18)  SpO2: 99% (10-08-24 @ 11:24) (95% - 99%)     Vital Signs Last 24 Hrs  T(C): 36.5 (10-08-24 @ 17:12), Max: 36.5 (10-08-24 @ 17:12)  T(F): 97.7 (10-08-24 @ 17:12), Max: 97.7 (10-08-24 @ 17:12)  HR: 85 (10-08-24 @ 17:12) (63 - 85)  BP: 110/72 (10-08-24 @ 17:12) (96/62 - 110/72)  BP(mean): --  RR: 18 (10-08-24 @ 11:24) (18 - 18)  SpO2: 97% (10-08-24 @ 17:12) (95% - 99%)

## 2024-10-08 NOTE — BH CONSULTATION LIAISON PROGRESS NOTE - CURRENT MEDICATION
MEDICATIONS  (STANDING):  acetaminophen   Oral Liquid .. 500 milliGRAM(s) Oral every 8 hours  calcium carbonate   1250 mG (OsCal) 1 Tablet(s) Oral two times a day  chlorhexidine 2% Cloths 1 Application(s) Topical daily  cholecalciferol 1000 Unit(s) Oral daily  ferrous    sulfate 325 milliGRAM(s) Oral daily  heparin   Injectable 5000 Unit(s) SubCutaneous every 12 hours  influenza  Vaccine (HIGH DOSE) 0.5 milliLiter(s) IntraMuscular once  mupirocin 2% Nasal 1 Application(s) Both Nostrils every 12 hours  mupirocin 2% Ointment 1 Application(s) Topical two times a day  pancrelipase  (CREON  6,000 Lipase Units) 1 Capsule(s) Oral three times a day with meals  pregabalin 25 milliGRAM(s) Oral <User Schedule>    MEDICATIONS  (PRN):  aluminum hydroxide/magnesium hydroxide/simethicone Suspension 30 milliLiter(s) Oral every 4 hours PRN Dyspepsia  diazepam    Tablet 5 milliGRAM(s) Oral two times a day PRN anxiety  melatonin 3 milliGRAM(s) Oral at bedtime PRN Insomnia  ondansetron Injectable 4 milliGRAM(s) IV Push every 8 hours PRN Nausea and/or Vomiting  oxyCODONE    Solution 2.5 milliGRAM(s) Oral every 6 hours PRN Moderate Pain (4 - 6) and severe pain  pancrelipase  (CREON  6,000 Lipase Units) 1 Capsule(s) Oral with dinner PRN Bedtime snack  polyethylene glycol 3350 17 Gram(s) Oral daily PRN Constipation  senna 2 Tablet(s) Oral daily PRN Constipation  simethicone 80 milliGRAM(s) Chew every 8 hours PRN Gas  
MEDICATIONS  (STANDING):  calcium carbonate   1250 mG (OsCal) 1 Tablet(s) Oral two times a day  chlorhexidine 2% Cloths 1 Application(s) Topical daily  cholecalciferol 1000 Unit(s) Oral daily  ferrous    sulfate 325 milliGRAM(s) Oral daily  heparin   Injectable 5000 Unit(s) SubCutaneous every 12 hours  influenza  Vaccine (HIGH DOSE) 0.5 milliLiter(s) IntraMuscular once  lidocaine   4% Patch 1 Patch Transdermal daily  pancrelipase  (CREON  6,000 Lipase Units) 1 Capsule(s) Oral three times a day with meals  pregabalin 25 milliGRAM(s) Oral at bedtime    MEDICATIONS  (PRN):  acetaminophen     Tablet .. 650 milliGRAM(s) Oral every 6 hours PRN Temp greater or equal to 38C (100.4F), Mild Pain (1 - 3)  aluminum hydroxide/magnesium hydroxide/simethicone Suspension 30 milliLiter(s) Oral every 4 hours PRN Dyspepsia  diazepam    Tablet 5 milliGRAM(s) Oral two times a day PRN anxiety  melatonin 3 milliGRAM(s) Oral at bedtime PRN Insomnia  ondansetron Injectable 4 milliGRAM(s) IV Push every 8 hours PRN Nausea and/or Vomiting  oxyCODONE    Solution 2.5 milliGRAM(s) Oral every 8 hours PRN Moderate Pain (4 - 6)  oxyCODONE    Solution 5 milliGRAM(s) Oral every 8 hours PRN Severe Pain (7 - 10)  pancrelipase  (CREON  6,000 Lipase Units) 1 Capsule(s) Oral with dinner PRN Bedtime snack  polyethylene glycol 3350 17 Gram(s) Oral daily PRN Constipation  senna 2 Tablet(s) Oral daily PRN Constipation  simethicone 80 milliGRAM(s) Chew every 8 hours PRN Gas  
MEDICATIONS  (STANDING):  calcium carbonate    500 mG (Tums) Chewable 1 Tablet(s) Chew two times a day  cholecalciferol 1000 Unit(s) Oral daily  ferrous    sulfate 325 milliGRAM(s) Oral daily  heparin   Injectable 5000 Unit(s) SubCutaneous every 12 hours  influenza  Vaccine (HIGH DOSE) 0.5 milliLiter(s) IntraMuscular once  lidocaine   4% Patch 1 Patch Transdermal daily  pancrelipase  (CREON  6,000 Lipase Units) 1 Capsule(s) Oral three times a day with meals  pregabalin 25 milliGRAM(s) Oral at bedtime  senna 2 Tablet(s) Oral at bedtime    MEDICATIONS  (PRN):  acetaminophen     Tablet .. 650 milliGRAM(s) Oral every 6 hours PRN Temp greater or equal to 38C (100.4F), Mild Pain (1 - 3)  aluminum hydroxide/magnesium hydroxide/simethicone Suspension 30 milliLiter(s) Oral every 4 hours PRN Dyspepsia  diazepam    Tablet 5 milliGRAM(s) Oral two times a day PRN anxiety  HYDROmorphone   Tablet 0.5 milliGRAM(s) Oral every 8 hours PRN moderate to severe pain (4-10)  HYDROmorphone  Injectable 0.2 milliGRAM(s) IV Push every 8 hours PRN severe breakthrough pain/prior to imaging  melatonin 3 milliGRAM(s) Oral at bedtime PRN Insomnia  ondansetron Injectable 4 milliGRAM(s) IV Push every 8 hours PRN Nausea and/or Vomiting  polyethylene glycol 3350 17 Gram(s) Oral daily PRN Constipation  
MEDICATIONS  (STANDING):  ferrous    sulfate 325 milliGRAM(s) Oral daily  heparin   Injectable 5000 Unit(s) SubCutaneous every 12 hours  influenza  Vaccine (HIGH DOSE) 0.5 milliLiter(s) IntraMuscular once  lidocaine   4% Patch 1 Patch Transdermal daily  pancrelipase  (CREON  6,000 Lipase Units) 1 Capsule(s) Oral three times a day with meals  pregabalin 25 milliGRAM(s) Oral at bedtime  senna 2 Tablet(s) Oral at bedtime    MEDICATIONS  (PRN):  acetaminophen     Tablet .. 650 milliGRAM(s) Oral every 6 hours PRN Temp greater or equal to 38C (100.4F), Mild Pain (1 - 3)  aluminum hydroxide/magnesium hydroxide/simethicone Suspension 30 milliLiter(s) Oral every 4 hours PRN Dyspepsia  clonazePAM  Tablet 0.5 milliGRAM(s) Oral two times a day PRN anxiety  HYDROmorphone   Tablet 0.5 milliGRAM(s) Oral every 8 hours PRN moderate to severe pain (4-10)  HYDROmorphone  Injectable 0.2 milliGRAM(s) IV Push every 8 hours PRN severe breakthrough pain/prior to imaging  melatonin 3 milliGRAM(s) Oral at bedtime PRN Insomnia  ondansetron Injectable 4 milliGRAM(s) IV Push every 8 hours PRN Nausea and/or Vomiting  polyethylene glycol 3350 17 Gram(s) Oral daily PRN Constipation  
MEDICATIONS  (STANDING):  calcium carbonate   1250 mG (OsCal) 1 Tablet(s) Oral two times a day  cholecalciferol 1000 Unit(s) Oral daily  ferrous    sulfate 325 milliGRAM(s) Oral daily  heparin   Injectable 5000 Unit(s) SubCutaneous every 12 hours  influenza  Vaccine (HIGH DOSE) 0.5 milliLiter(s) IntraMuscular once  lidocaine   4% Patch 1 Patch Transdermal daily  pancrelipase  (CREON  6,000 Lipase Units) 1 Capsule(s) Oral three times a day with meals  pregabalin 25 milliGRAM(s) Oral at bedtime  senna 2 Tablet(s) Oral at bedtime    MEDICATIONS  (PRN):  acetaminophen     Tablet .. 650 milliGRAM(s) Oral every 6 hours PRN Temp greater or equal to 38C (100.4F), Mild Pain (1 - 3)  aluminum hydroxide/magnesium hydroxide/simethicone Suspension 30 milliLiter(s) Oral every 4 hours PRN Dyspepsia  diazepam    Tablet 5 milliGRAM(s) Oral two times a day PRN anxiety  HYDROmorphone   Tablet 0.5 milliGRAM(s) Oral every 8 hours PRN Moderate Pain (4 - 6)  HYDROmorphone   Tablet 1 milliGRAM(s) Oral every 8 hours PRN Severe Pain (7 - 10)  melatonin 3 milliGRAM(s) Oral at bedtime PRN Insomnia  ondansetron Injectable 4 milliGRAM(s) IV Push every 8 hours PRN Nausea and/or Vomiting  polyethylene glycol 3350 17 Gram(s) Oral daily PRN Constipation  
MEDICATIONS  (STANDING):  calcium carbonate   1250 mG (OsCal) 1 Tablet(s) Oral two times a day  cholecalciferol 1000 Unit(s) Oral daily  ferrous    sulfate 325 milliGRAM(s) Oral daily  heparin   Injectable 5000 Unit(s) SubCutaneous every 12 hours  influenza  Vaccine (HIGH DOSE) 0.5 milliLiter(s) IntraMuscular once  lidocaine   4% Patch 1 Patch Transdermal daily  pancrelipase  (CREON  6,000 Lipase Units) 1 Capsule(s) Oral three times a day with meals  pregabalin 25 milliGRAM(s) Oral at bedtime  senna 2 Tablet(s) Oral at bedtime    MEDICATIONS  (PRN):  acetaminophen     Tablet .. 650 milliGRAM(s) Oral every 6 hours PRN Temp greater or equal to 38C (100.4F), Mild Pain (1 - 3)  aluminum hydroxide/magnesium hydroxide/simethicone Suspension 30 milliLiter(s) Oral every 4 hours PRN Dyspepsia  diazepam    Tablet 5 milliGRAM(s) Oral two times a day PRN anxiety  HYDROmorphone   Tablet 1 milliGRAM(s) Oral every 8 hours PRN Severe Pain (7 - 10)  HYDROmorphone   Tablet 0.5 milliGRAM(s) Oral every 8 hours PRN Moderate Pain (4 - 6)  melatonin 3 milliGRAM(s) Oral at bedtime PRN Insomnia  ondansetron Injectable 4 milliGRAM(s) IV Push every 8 hours PRN Nausea and/or Vomiting  polyethylene glycol 3350 17 Gram(s) Oral daily PRN Constipation

## 2024-10-08 NOTE — PROGRESS NOTE ADULT - PROVIDER SPECIALTY LIST ADULT
Hospitalist
Pain Medicine
Endocrinology
Hospitalist

## 2024-10-08 NOTE — PROGRESS NOTE ADULT - PROBLEM SELECTOR PLAN 7
- heparin subq for dvt ppx    Plan discussed with ACP
Hb stable, ferritin low normal, vit b12wnl, folate elevated  cont po iron   cont to monitor
Hb stable, ferritin low normal, vit b12wnl, folate elevated  cont po iron   cont to monitor
-Per med rec pharmacy Patient states she is not taking the following medications recently dispensed from pharmacy: pregabalin, clonazepam 0.25 mg (patient is currently taking clonazepam 0.5mg), hydroxyzine, duloxetine, lactulose, venlafaxine, morphine.
Hb stable, ferritin low normal, vit b12wnl, folate elevated  cont po iron   cont to monitor

## 2024-10-08 NOTE — BH PATIENT PROFILE - HOME MEDICATIONS
cholecalciferol oral tablet , 1000 unit(s) orally once a day  simethicone 80 mg oral tablet, chewable , 1 tab(s) orally every 8 hours As needed Gas  calcium carbonate 1250 mg (500 mg elemental calcium) oral tablet , 1 tab(s) orally 2 times a day  polyethylene glycol 3350 oral powder for reconstitution , 17 gram(s) orally once a day As needed Constipation  senna leaf extract oral tablet , 2 tab(s) orally once a day As needed Constipation  ferrous sulfate 325 mg (65 mg elemental iron) oral tablet , 1 tab(s) orally once a day  pancrelipase 6000 units-19,000 units-30,000 units oral delayed release capsule , 1 cap(s) orally once a day (before a meal) As needed Bedtime snack  pancrelipase 6000 units-19,000 units-30,000 units oral delayed release capsule , 1 cap(s) orally 3 times a day (with meals)  mupirocin 2% topical ointment , 1 Apply topically to affected area 2 times a day  melatonin 3 mg oral tablet , 1 tab(s) orally once a day (at bedtime) As needed Insomnia  diazePAM 5 mg oral tablet , 1 tab(s) orally 2 times a day As needed anxiety  aluminum hydroxide-magnesium hydroxide 200 mg-200 mg/5 mL oral suspension , 30 milliliter(s) orally every 4 hours As needed Dyspepsia  pregabalin 25 mg oral capsule , 1 cap(s) orally 3 times a day  Tylenol 325 mg oral tablet , 2 tab(s) orally every 6 hours as needed for  mild pain  oxyCODONE 5 mg/5 mL oral solution , 2.5 milliliter(s) orally every 6 hours As needed Moderate Pain (4 - 6) and severe pain   oxyCODONE 5 mg oral tablet , 0.5 tab(s) orally 4 times a day as needed for  moderate pain MDD: 10mg  Creon 6000 units oral delayed release capsule , 1 cap(s) orally 5 times a day with meals three times a day and with snacks as needed  polyethylene glycol 3350 oral powder for reconstitution , 17 gram(s) orally once a day  Multiple Vitamins with Minerals oral tablet , 1 tab(s) orally once a day  Innerclean oral tablet , 2 tab(s) orally once a day (at bedtime)  FeroSul 325 mg (65 mg elemental iron) oral tablet , 1 tab(s) orally once a day  Valium 5 mg oral tablet , 1 tab(s) orally every 12 hours as needed for anxiety MDD: 10mg  DULoxetine 30 mg oral delayed release capsule , 1 cap(s) orally once a day  Lyrica 25 mg oral capsule , 1 cap(s) orally 3 times a day MDD: 75mg  cholecalciferol oral tablet , 1000 unit(s) orally once a day  simethicone 80 mg oral tablet, chewable , 1 tab(s) orally every 8 hours As needed Gas  calcium carbonate 1250 mg (500 mg elemental calcium) oral tablet , 1 tab(s) orally 2 times a day  melatonin 3 mg oral tablet , 1 tab(s) orally once a day (at bedtime) As needed Insomnia  aluminum hydroxide-magnesium hydroxide 200 mg-200 mg/5 mL oral suspension , 30 milliliter(s) orally every 4 hours As needed Dyspepsia  Tylenol 325 mg oral tablet , 2 tab(s) orally every 6 hours as needed for  mild pain

## 2024-10-08 NOTE — PROGRESS NOTE ADULT - PROBLEM SELECTOR PLAN 6
vit d level low at 28, pth wnl  cont vit d
vit d level low at 28, pth wnl  cont vit d
-Patient cannot provide list of medications. [facility] pharmacy emailed. Day team to follow
vit d level low at 28, pth wnl  cont vit d
Hb at 10.6 today, ferritin low normal, vit b12wnl, folate elevatd  will add po iron if pt is agreeable  cont to monitor
vit d level low at 28, pth wnl  cont vit d

## 2024-10-08 NOTE — PROGRESS NOTE ADULT - NUTRITIONAL ASSESSMENT
This patient has been assessed with a concern for Malnutrition and has been determined to have a diagnosis/diagnoses of Severe protein-calorie malnutrition and Underweight (BMI < 19).    This patient is being managed with:   Diet Pureed-  Lactose Restricted (Milk Sugar Intoler.)  Gluten-Gliadin Restricted  Entered: Oct  7 2024  4:30PM    Safety Tray-    Time/Priority:  Routine  Entered: Sep 29 2024 12:21PM  
This patient has been assessed with a concern for Malnutrition and has been determined to have a diagnosis/diagnoses of Severe protein-calorie malnutrition and Underweight (BMI < 19).    This patient is being managed with:   Diet Pureed-  Lactose Restricted (Milk Sugar Intoler.)  Gluten-Gliadin Restricted  Entered: Oct  3 2024 12:01PM    Safety Tray-    Time/Priority:  Routine  Entered: Sep 29 2024 12:21PM  
This patient has been assessed with a concern for Malnutrition and has been determined to have a diagnosis/diagnoses of Severe protein-calorie malnutrition and Underweight (BMI < 19).    This patient is being managed with:   Diet Pureed-  Lactose Restricted (Milk Sugar Intoler.)  Gluten-Gliadin Restricted  Ovo Vegetarian (Accepts Eggs)  Entered: Oct  5 2024  2:47PM    Safety Tray-    Time/Priority:  Routine  Entered: Sep 29 2024 12:21PM  
This patient has been assessed with a concern for Malnutrition and has been determined to have a diagnosis/diagnoses of Severe protein-calorie malnutrition and Underweight (BMI < 19).    This patient is being managed with:   Diet Pureed-  Lactose Restricted (Milk Sugar Intoler.)  Gluten-Gliadin Restricted  Entered: Oct  3 2024 12:01PM    Safety Tray-    Time/Priority:  Routine  Entered: Sep 29 2024 12:21PM  
This patient has been assessed with a concern for Malnutrition and has been determined to have a diagnosis/diagnoses of Severe protein-calorie malnutrition and Underweight (BMI < 19).    This patient is being managed with:   Diet Pureed-  Lactose Restricted (Milk Sugar Intoler.)  Gluten-Gliadin Restricted  Lacto-Ovo Veg (Accepts Milk Prod. Eggs)  Entered: Oct  5 2024  8:55AM    Safety Tray-    Time/Priority:  Routine  Entered: Sep 29 2024 12:21PM  
This patient has been assessed with a concern for Malnutrition and has been determined to have a diagnosis/diagnoses of Severe protein-calorie malnutrition and Underweight (BMI < 19).    This patient is being managed with:   Diet Soft and Bite Sized-  Entered: Sep 29 2024  8:58PM    Safety Tray-    Time/Priority:  Routine  Entered: Sep 29 2024 12:21PM  
This patient has been assessed with a concern for Malnutrition and has been determined to have a diagnosis/diagnoses of Severe protein-calorie malnutrition and Underweight (BMI < 19).    This patient is being managed with:   Diet Soft and Bite Sized-  Entered: Sep 29 2024  8:58PM    Safety Tray-    Time/Priority:  Routine  Entered: Sep 29 2024 12:21PM  
This patient has been assessed with a concern for Malnutrition and has been determined to have a diagnosis/diagnoses of Severe protein-calorie malnutrition and Underweight (BMI < 19).    This patient is being managed with:   Diet Pureed-  Lactose Restricted (Milk Sugar Intoler.)  Gluten-Gliadin Restricted  Ovo Vegetarian (Accepts Eggs)  Entered: Oct  5 2024  2:47PM    Safety Tray-    Time/Priority:  Routine  Entered: Sep 29 2024 12:21PM

## 2024-10-08 NOTE — PROGRESS NOTE ADULT - PROBLEM SELECTOR PLAN 8
-Per med rec pharmacy Patient states she is not taking the following medications recently dispensed from pharmacy: pregabalin, clonazepam 0.25 mg (patient is currently taking clonazepam 0.5mg), hydroxyzine, duloxetine, lactulose, venlafaxine, morphine.
- heparin subq for dvt ppx    Plan discussed with ACP
-Per med rec pharmacy Patient states she is not taking the following medications recently dispensed from pharmacy: pregabalin, clonazepam 0.25 mg (patient is currently taking clonazepam 0.5mg), hydroxyzine, duloxetine, lactulose, venlafaxine, morphine.
-Per med rec pharmacy Patient states she is not taking the following medications recently dispensed from pharmacy: pregabalin, clonazepam 0.25 mg (patient is currently taking clonazepam 0.5mg), hydroxyzine, duloxetine, lactulose, venlafaxine, morphine.

## 2024-10-08 NOTE — PROGRESS NOTE ADULT - ASSESSMENT
69 y/o F with pmhx of chronic back pain, anxiety, depression, OA, IBS hx of suicide attempt presented to the ED for new onset suicidal ideations. Admit for psych eval and pain management of chronic back pain.    10/7 pain mng f/u appreciated--> see above  f/u psych 69 y/o F with pmhx of chronic back pain, anxiety, depression, OA, IBS hx of suicide attempt presented to the ED for new onset suicidal ideations. Admit for psych eval and pain management of chronic back pain.    10/7 pain mng f/u appreciated--> see above  f/u psych  10/8, Patient notes pain is controlled with Lyrica

## 2024-10-08 NOTE — BH CONSULTATION LIAISON PROGRESS NOTE - NSBHASSESSMENTFT_PSY_ALL_CORE
Patient is a 69 yo female with PPHx of depression and anxiety with no prior inpatient psychiatric hospitalizations, currently in outpatient treatment with psychiatrist Dr. Puente, and PMH significant for chronic back pain,  irritable bowel syndrome and osteoporosis with hx of compression fractures who presented s/p suicide attempt via overdose on her medication. Psychiatry consulted for concerns of suicidal ideation.    On evaluation, patient cooperative albeit somewhat anxious and tearful when explaining her suffering and debilitating pain. Clarifies that her suicide attempt was solely in context of wanting her pain to stop. Admits to some hopelessness due to several failed pain medication trials, stating that lack of improvement on lyrica was the final trigger for her suicide attempt. At this time, denying any further SI and expressing motivation to work with pain management team. Discussed likely indication for inpatient psychiatric hospitalization in context of safety concerns/recent suicide attempt, although at this time patient remains bedbound with severe pain symptoms which will need to be addressed first.     10/1: Patient remains distressed by ongoing pain although is pleased that she was able to walk down the hallway multiple times with physical therapy. May benefit from trial of valium instead of klonopin for ongoing anxiety symptoms in context of chronic pain. Continues to warrant inpatient psychiatric hospitalization for suicidality/depression although pain remains significant barrier and further management of pain will be needed.     10/2: Patient reporting improved anxiety with valium. Continues to express interest in nonpharmacological management of anxiety as well including  services, meditation apps and essential oils. Continues to warrant inpatient psychiatric hospitalization when medically clear in context of recent suicide attempt, although continues to have pain which will need further management.     Recommendations  - Continue 1:1 for suicidal ideation  - c/w valium 5 mg BID PRN for anxiety   - Recommend pain management  - may benefit from holistic nursing consult  - will benefit from  consult.   - Will plan to obtain further collateral from outpatient psychiatrist Dr. Puente ([phone]).   - Disposition: Due to significant safety concerns, suicidal ideation, recent suicide attempt, pt meets criteria for involuntary inpatient psychiatric hospitalization. At this time, however, patient remains bedbound and in severe pain which may not be able to be appropriately managed in psychiatric unit. Recommending ongoing pain control, medical workup/clearance and CL psychiatry will continue to assess.  
Patient is a 67 yo female with PPHx of depression and anxiety with no prior inpatient psychiatric hospitalizations, currently in outpatient treatment with psychiatrist Dr. Puente, and PMH significant for chronic back pain,  irritable bowel syndrome and osteoporosis with hx of compression fractures who presented s/p suicide attempt via overdose on her medication. Psychiatry consulted for concerns of suicidal ideation.    On evaluation, patient cooperative albeit somewhat anxious and tearful when explaining her suffering and debilitating pain. Clarifies that her suicide attempt was solely in context of wanting her pain to stop. Admits to some hopelessness due to several failed pain medication trials, stating that lack of improvement on lyrica was the final trigger for her suicide attempt. At this time, denying any further SI and expressing motivation to work with pain management team. Discussed likely indication for inpatient psychiatric hospitalization in context of safety concerns/recent suicide attempt, although at this time patient remains bedbound with severe pain symptoms which will need to be addressed first.     10/1: Patient remains distressed by ongoing pain although is pleased that she was able to walk down the hallway multiple times with physical therapy. May benefit from trial of valium instead of klonopin for ongoing anxiety symptoms in context of chronic pain. Continues to warrant inpatient psychiatric hospitalization for suicidality/depression although pain remains significant barrier and further management of pain will be needed.     10/2: Patient reporting improved anxiety with valium. Continues to express interest in nonpharmacological management of anxiety as well including  services, meditation apps and essential oils. Continues to warrant inpatient psychiatric hospitalization when medically clear in context of recent suicide attempt, although continues to have pain which will need further management.     10/3- Continues to report improvement in overall pain and is pleased that she was able to ambulate with physical therapy. Continues to report that valium is helping with her anxiety.     10/4 - Still depressed and with passive but not active SI, c/w 1:1, OK to c/w current regiment. Given concerns for constipation and bloating currently in the past may be candidate for methylnatrexone? defer to pain mgmt regarding this.     10/7: Patient continues to report severe pain which is negatively affecting her mood. Continues to deny active SI/HI and AVH.     Recommendations  - Continue 1:1 for suicidal ideation (OK to have phone and plastic cutlery)  - c/w valium 5 mg BID PRN for anxiety  - Maximize bowel regimen/constipation ppx    - C/w pain management, re: constipation would patient be candidate for methylnaltrexone for opioid related constipation?  -  consult  - collateral from outpatient psychiatrist Dr. Puente ([phone]) pending  - Disposition: Due to significant safety concerns, suicidal ideation, recent suicide attempt, pt meets criteria for involuntary inpatient psychiatric hospitalization when medically clear. Cannot leave AMA. 2PC signed and placed in chart on 10/4.  
Patient is a 67 yo female with PPHx of depression and anxiety with no prior inpatient psychiatric hospitalizations, currently in outpatient treatment with psychiatrist Dr. Puente, and PMH significant for chronic back pain,  irritable bowel syndrome and osteoporosis with hx of compression fractures who presented s/p suicide attempt via overdose on her medication. Psychiatry consulted for concerns of suicidal ideation.    On evaluation, patient cooperative albeit somewhat anxious and tearful when explaining her suffering and debilitating pain. Clarifies that her suicide attempt was solely in context of wanting her pain to stop. Admits to some hopelessness due to several failed pain medication trials, stating that lack of improvement on lyrica was the final trigger for her suicide attempt. At this time, denying any further SI and expressing motivation to work with pain management team. Discussed likely indication for inpatient psychiatric hospitalization in context of safety concerns/recent suicide attempt, although at this time patient remains bedbound with severe pain symptoms which will need to be addressed first.     10/1: Patient remains distressed by ongoing pain although is pleased that she was able to walk down the hallway multiple times with physical therapy. May benefit from trial of valium instead of klonopin for ongoing anxiety symptoms in context of chronic pain. Continues to warrant inpatient psychiatric hospitalization for suicidality/depression although pain remains significant barrier and further management of pain will be needed.     Recommendations  - Continue 1:1 for suicidal ideation  - Can consider d/c klonopin PRN and switch to valium 5 mg BID PRN for anxiety   - Recommend pain management  - may benefit from holistic nursing consult  - will benefit from  consult.   - Will plan to obtain further collateral from outpatient psychiatrist Dr. Puente ([phone]).   - Disposition: Due to significant safety concerns, suicidal ideation, recent suicide attempt, pt meets criteria for involuntary inpatient psychiatric hospitalization. At this time, however, patient remains bedbound and in severe pain which may not be able to be appropriately managed in psychiatric unit. Recommending ongoing pain control, medical workup/clearance and CL psychiatry will continue to assess.  
Patient is a 69 yo female with PPHx of depression and anxiety with no prior inpatient psychiatric hospitalizations, currently in outpatient treatment with psychiatrist Dr. Puente, and PMH significant for chronic back pain,  irritable bowel syndrome and osteoporosis with hx of compression fractures who presented s/p suicide attempt via overdose on her medication. Psychiatry consulted for concerns of suicidal ideation.    On evaluation, patient cooperative albeit somewhat anxious and tearful when explaining her suffering and debilitating pain. Clarifies that her suicide attempt was solely in context of wanting her pain to stop. Admits to some hopelessness due to several failed pain medication trials, stating that lack of improvement on lyrica was the final trigger for her suicide attempt. At this time, denying any further SI and expressing motivation to work with pain management team. Discussed likely indication for inpatient psychiatric hospitalization in context of safety concerns/recent suicide attempt, although at this time patient remains bedbound with severe pain symptoms which will need to be addressed first.     10/1: Patient remains distressed by ongoing pain although is pleased that she was able to walk down the hallway multiple times with physical therapy. May benefit from trial of valium instead of klonopin for ongoing anxiety symptoms in context of chronic pain. Continues to warrant inpatient psychiatric hospitalization for suicidality/depression although pain remains significant barrier and further management of pain will be needed.     10/2: Patient reporting improved anxiety with valium. Continues to express interest in nonpharmacological management of anxiety as well including  services, meditation apps and essential oils. Continues to warrant inpatient psychiatric hospitalization when medically clear in context of recent suicide attempt, although continues to have pain which will need further management.     10/3- Continues to report improvement in overall pain and is pleased that she was able to ambulate with physical therapy. Continues to report that valium is helping with her anxiety.     10/4 - Still depressed and with passive but not active SI, c/w 1:1, OK to c/w current regiment. Given concerns for constipation and bloating currently in the past may be candidate for methylnatrexone? defer to pain mgmt regarding this.     10/7: Patient continues to report severe pain which is negatively affecting her mood. Continues to deny active SI/HI and AVH.     10/8: Pt reporting that her pain has improved. She is medically clear for transfer to [facility]. Continues to deny active SI/HI and AVH.     Recommendations  - Continue 1:1 for suicidal ideation while patient remains on the medical unit (OK to have phone and plastic cutlery)  - c/w valium 5 mg BID PRN for anxiety  - Maximize bowel regimen/constipation ppx    - C/w pain management, re: constipation would patient be candidate for methylnaltrexone for opioid related constipation?  -  consult  - collateral from outpatient psychiatrist Dr. Puente ([phone]) pending  - Disposition: Due to significant safety concerns, suicidal ideation, recent suicide attempt, pt meets criteria for involuntary inpatient psychiatric hospitalization when medically clear. Cannot leave AMA. 2PC signed and placed in chart on 10/4. Patient pending transfer to [facility] today. Handoff to team at [address] [place] [address] provided.   
Patient is a 69 yo female with PPHx of depression and anxiety with no prior inpatient psychiatric hospitalizations, currently in outpatient treatment with psychiatrist Dr. Puente, and PMH significant for chronic back pain,  irritable bowel syndrome and osteoporosis with hx of compression fractures who presented s/p suicide attempt via overdose on her medication. Psychiatry consulted for concerns of suicidal ideation.    On evaluation, patient cooperative albeit somewhat anxious and tearful when explaining her suffering and debilitating pain. Clarifies that her suicide attempt was solely in context of wanting her pain to stop. Admits to some hopelessness due to several failed pain medication trials, stating that lack of improvement on lyrica was the final trigger for her suicide attempt. At this time, denying any further SI and expressing motivation to work with pain management team. Discussed likely indication for inpatient psychiatric hospitalization in context of safety concerns/recent suicide attempt, although at this time patient remains bedbound with severe pain symptoms which will need to be addressed first.     10/1: Patient remains distressed by ongoing pain although is pleased that she was able to walk down the hallway multiple times with physical therapy. May benefit from trial of valium instead of klonopin for ongoing anxiety symptoms in context of chronic pain. Continues to warrant inpatient psychiatric hospitalization for suicidality/depression although pain remains significant barrier and further management of pain will be needed.     10/2: Patient reporting improved anxiety with valium. Continues to express interest in nonpharmacological management of anxiety as well including  services, meditation apps and essential oils. Continues to warrant inpatient psychiatric hospitalization when medically clear in context of recent suicide attempt, although continues to have pain which will need further management.     10/3- Continues to report improvement in overall pain and is pleased that she was able to ambulate with physical therapy. Continues to report that valium is helping with her anxiety.     Recommendations  - Continue 1:1 for suicidal ideation  - c/w valium 5 mg BID PRN for anxiety   - Recommend pain management  - may benefit from holistic nursing consult  - will benefit from  consult.   - collateral from outpatient psychiatrist Dr. Puente ([phone]) pending  - Disposition: Due to significant safety concerns, suicidal ideation, recent suicide attempt, pt meets criteria for involuntary inpatient psychiatric hospitalization when medically clear. Cannot leave AMA.   
Patient is a 69 yo female with PPHx of depression and anxiety with no prior inpatient psychiatric hospitalizations, currently in outpatient treatment with psychiatrist Dr. Puente, and PMH significant for chronic back pain,  irritable bowel syndrome and osteoporosis with hx of compression fractures who presented s/p suicide attempt via overdose on her medication. Psychiatry consulted for concerns of suicidal ideation.    On evaluation, patient cooperative albeit somewhat anxious and tearful when explaining her suffering and debilitating pain. Clarifies that her suicide attempt was solely in context of wanting her pain to stop. Admits to some hopelessness due to several failed pain medication trials, stating that lack of improvement on lyrica was the final trigger for her suicide attempt. At this time, denying any further SI and expressing motivation to work with pain management team. Discussed likely indication for inpatient psychiatric hospitalization in context of safety concerns/recent suicide attempt, although at this time patient remains bedbound with severe pain symptoms which will need to be addressed first.     10/1: Patient remains distressed by ongoing pain although is pleased that she was able to walk down the hallway multiple times with physical therapy. May benefit from trial of valium instead of klonopin for ongoing anxiety symptoms in context of chronic pain. Continues to warrant inpatient psychiatric hospitalization for suicidality/depression although pain remains significant barrier and further management of pain will be needed.     10/2: Patient reporting improved anxiety with valium. Continues to express interest in nonpharmacological management of anxiety as well including  services, meditation apps and essential oils. Continues to warrant inpatient psychiatric hospitalization when medically clear in context of recent suicide attempt, although continues to have pain which will need further management.     10/3- Continues to report improvement in overall pain and is pleased that she was able to ambulate with physical therapy. Continues to report that valium is helping with her anxiety.     10/4 - Still depressed and with passive but not active SI, c/w 1:1, OK to c/w current regiment. Given concerns for constipation and bloating currently in the past may be candidate for methylnatrexone? defer to pain mgmt regarding this.     Recommendations  - Continue 1:1 for suicidal ideation (OK to have phone and plastic cutlery)  - c/w valium 5 mg BID PRN for anxiety  - Maximize bowel regimen/constipation ppx    - C/w pain management, re: constipation would patient be candidate for methylnaltrexone for opioid related constipation?  -  consult  - collateral from outpatient psychiatrist Dr. Puente ([phone]) pending  - Disposition: Due to significant safety concerns, suicidal ideation, recent suicide attempt, pt meets criteria for involuntary inpatient psychiatric hospitalization when medically clear. Cannot leave AMA.     2PC signed 10/4

## 2024-10-08 NOTE — DISCHARGE NOTE NURSING/CASE MANAGEMENT/SOCIAL WORK - NSDCFUADDAPPT_GEN_ALL_CORE_FT
Follow up with PCP within 1-2 weeks of discharge. If you are in need of a general medicine physician and post-discharge medical follow-up for further care/recommendations you may contact the [facility] for an appointment at [phone].     Follow up with psychiatry within 1-2 weeks of discharge.     Follow up with -- within 1-2 weeks of discharge.

## 2024-10-08 NOTE — PROGRESS NOTE ADULT - PROBLEM SELECTOR PLAN 5
- patient was recently prescribed SSRIs but as per family member does not take them because "they do not work"  - psych consulted and follg  -cont current meds
- patient was recently prescribed SSRIs but as per family member does not take them because "they do not work"  - psych eval
- patient was recently prescribed SSRIs but as per family member does not take them because "they do not work"  - psych consulted and angel
- patient was recently prescribed SSRIs but as per family member does not take them because "they do not work"  - psych consulted and angel
- patient was recently prescribed SSRIs but as per family member does not take them because "they do not work"  - psych consulted and follg  -cont current meds
- patient was recently prescribed SSRIs but as per family member does not take them because "they do not work"  - psych consulted and follg  -cont current meds
- patient was recently prescribed SSRIs but as per family member does not take them because "they do not work"  - psych consulted and angel
- patient was recently prescribed SSRIs but as per family member does not take them because "they do not work"  - psych consulted and follg  -cont current meds
- patient was recently prescribed SSRIs but as per family member does not take them because "they do not work"  - psych consulted and follg  -cont current meds

## 2024-10-08 NOTE — BH CONSULTATION LIAISON PROGRESS NOTE - NSBHPTASSESSDT_PSY_A_CORE
08-Oct-2024 16:23
04-Oct-2024 12:33
01-Oct-2024 16:53
03-Oct-2024 14:56
02-Oct-2024 13:45
07-Oct-2024 14:59

## 2024-10-08 NOTE — BH INPATIENT PSYCHIATRY ASSESSMENT NOTE - NSBHATTESTCOMMENTATTENDFT_PSY_A_CORE
69 yo female with PPHx of depression and anxiety, p/w severe osteoporosis and worsening chronic pain, p/w suicide attempt. Patient admits frankly to SA due to severe untractable pain. Admits to depression in this context, along with poor sleep. Does not want inpt psych, though told that SA typically would align with inpt psych admission. Barriers to this would include continued pain which is unlikely to be mitigated by inpt psych, severe deconditioning, bedbound status which likely requires hospital bed. Would ensure adequate pain control whilst mgmt of sfx which patient has been fastidious/vigilant of. Still needs inpt psych. Psych will follow closely for dispo. Meds as above.

## 2024-10-08 NOTE — PROGRESS NOTE ADULT - PROBLEM SELECTOR PROBLEM 2
Anxiety
Vertebral compression fracture
Anxiety

## 2024-10-08 NOTE — PHARMACOTHERAPY INTERVENTION NOTE - COMMENTS
spoke to dr. Parola, Claude to change oxycodone solution to tablet as we don't carry solution
spoke to dr. Parola, Claude that pt's creon RX from [facility] is 6 capsules 3xday. MD will change it. Bedtime snack dosing ,  will change it to 2 capsules.

## 2024-10-08 NOTE — BH INPATIENT PSYCHIATRY ASSESSMENT NOTE - RISK ASSESSMENT
Patient has chronic elevated risk for self harm given age, h/o psychiatric diagnosis, and chronic pain. Patient also has active psychiatric sx, poor sleep patterns, and presented with recent suicide attempt.   Protective factors include positive therapeutic relationship with psychiatrist, family support from , Islam beliefs, and pet cat.

## 2024-10-08 NOTE — BH INPATIENT PSYCHIATRY ASSESSMENT NOTE - NSBHATTESTBILLING_PSY_A_CORE
67620-Tzzovsvrcyd diagnostic evaluation with medical services 99221-Initial OBS or IP - low complexity OR 40-54 mins

## 2024-10-08 NOTE — PROGRESS NOTE ADULT - REASON FOR ADMISSION
chronic back pain, SI

## 2024-10-08 NOTE — BH CONSULTATION LIAISON PROGRESS NOTE - NSICDXBHPRIMARYDX_PSY_ALL_CORE
Major depression   F32.9  

## 2024-10-08 NOTE — DISCHARGE NOTE NURSING/CASE MANAGEMENT/SOCIAL WORK - PATIENT PORTAL LINK FT
You can access the FollowMyHealth Patient Portal offered by [facility] by registering at the following website: [email or web address]. By joining [employer]’s FollowMyHealth portal, you will also be able to view your health information using other applications (apps) compatible with our system.

## 2024-10-08 NOTE — PROGRESS NOTE ADULT - PROBLEM SELECTOR PLAN 2
- patient currently taking 0.5mg Klonopin BID (patient usually take it at least QD and sometimes BID as per family member)  - on valium 5mg bid prn
- patient currently taking 0.5mg Klonopin BID (patient usually take it at least QD and sometimes BID as per family member)  - patient also given a 0.25 klonopin BID if patient wants to wean off benzos  - c/w klonopin prn for now
- patient currently taking 0.5mg Klonopin BID (patient usually take it at least QD and sometimes BID as per family member)  - on valium 5mg bid prn
- patient currently taking 0.5mg Klonopin BID (patient usually take it at least QD and sometimes BID as per family member)  - patient also given a 0.25 klonopin BID if patient wants to wean off benzos  - c/w klonopin prn for now
- patient currently taking 0.5mg Klonopin BID (patient usually take it at least QD and sometimes BID as per family member)  - patient also given a 0.25 klonopin BID if patient wants to wean off benzos  - on valium 5mg bid prn
- patient currently taking 0.5mg Klonopin BID (patient usually take it at least QD and sometimes BID as per family member)  - patient also given a 0.25 klonopin BID if patient wants to wean off benzos  - on valium 5mg bid prn

## 2024-10-08 NOTE — BH PATIENT PROFILE - FALL HARM RISK - HARM RISK INTERVENTIONS
Communicate Risk of Fall with Harm to all staff/Reinforce activity limits and safety measures with patient and family/Tailored Fall Risk Interventions/Visual Cue: Yellow wristband and red socks/Bed in lowest position, wheels locked, appropriate side rails in place/Call bell, personal items and telephone in reach/Instruct patient to call for assistance before getting out of bed or chair/Non-slip footwear when patient is out of bed/[place] to call system/Physically safe environment - no spills, clutter or unnecessary equipment/Purposeful Proactive Rounding/Room/bathroom lighting operational, light cord in reach Assistance with ambulation/Assistance OOB with selected safe patient handling equipment/Communicate Risk of Fall with Harm to all staff/Discuss with provider need for PT consult/Monitor for mental status changes/Monitor gait and stability/Move patient closer to nurses' station/Provide patient with walking aids - walker, cane, crutches/Reinforce activity limits and safety measures with patient and family/Reorient to person, place and time as needed/Tailored Fall Risk Interventions/Toileting schedule using arm’s reach rule for commode and bathroom/Use of alarms - bed, chair and/or voice tab/Visual Cue: Yellow wristband and red socks/Bed in lowest position, wheels locked, appropriate side rails in place/Call bell, personal items and telephone in reach/Instruct patient to call for assistance before getting out of bed or chair/Non-slip footwear when patient is out of bed/[place] to call system/Physically safe environment - no spills, clutter or unnecessary equipment/Purposeful Proactive Rounding/Room/bathroom lighting operational, light cord in reach

## 2024-10-08 NOTE — BH INPATIENT PSYCHIATRY ASSESSMENT NOTE - NSBHMETABOLIC_PSY_ALL_CORE_FT
BMI: BMI (kg/m2): 16.1 (09-29-24 @ 08:14)  HbA1c:   Glucose:   BP: --Vital Signs Last 24 Hrs  T(C): 36.2 (10-08-24 @ 11:24), Max: 36.3 (10-07-24 @ 20:20)  T(F): 97.1 (10-08-24 @ 11:24), Max: 97.4 (10-07-24 @ 20:20)  HR: 63 (10-08-24 @ 11:24) (63 - 77)  BP: 98/62 (10-08-24 @ 11:24) (96/62 - 103/57)  BP(mean): --  RR: 18 (10-08-24 @ 11:24) (18 - 18)  SpO2: 99% (10-08-24 @ 11:24) (95% - 99%)      Lipid Panel:  BMI: BMI (kg/m2): 16.1 (09-29-24 @ 08:14)  HbA1c:   Glucose:   BP: 110/72 (10-08-24 @ 17:12) (110/72 - 110/72)Vital Signs Last 24 Hrs  T(C): 36.5 (10-08-24 @ 17:12), Max: 36.5 (10-08-24 @ 17:12)  T(F): 97.7 (10-08-24 @ 17:12), Max: 97.7 (10-08-24 @ 17:12)  HR: 85 (10-08-24 @ 17:12) (63 - 85)  BP: 110/72 (10-08-24 @ 17:12) (96/62 - 110/72)  BP(mean): --  RR: 18 (10-08-24 @ 11:24) (18 - 18)  SpO2: 97% (10-08-24 @ 17:12) (95% - 99%)      Lipid Panel:

## 2024-10-08 NOTE — BH INPATIENT PSYCHIATRY ASSESSMENT NOTE - HPI (INCLUDE ILLNESS QUALITY, SEVERITY, DURATION, TIMING, CONTEXT, MODIFYING FACTORS, ASSOCIATED SIGNS AND SYMPTOMS)
Patient is a 67 yo female with PPHx of depression and anxiety with no prior inpatient psychiatric hospitalizations, currently in outpatient treatment with psychiatrist Dr. Puente, and PMH significant for chronic back pain,  irritable bowel syndrome and osteoporosis with hx of compression fractures who presented s/p suicide attempt via overdose on her medication. Psychiatry consulted for concerns of suicidal ideation.    Patient seen today with  at bedside with patient's consent. She is cooperative on approach and agreeable to an evaluation. Explains that she has been living in debilitating pain secondary to severe osteoporosis. In the past year, has become bedbound, unable to lay on her back or engage in meaningful physical activity, and no longer able to attend [employer] weekly. Also endorses poor sleep and overall low mood due to the pain. Patient states that her osteoporosis was so significant that earlier this year she had home hospice care to manage pain. She was discharged from home hospice as she was not taking the pain medication that was prescribed (patient reported experiencing abdominal discomfort due to the morphine at the time). States she is very sensitive to medications and also suffers from IBS with predominant diarrhea symptoms which greatly limits her appetite and choice of food. In June, also fractured her right hip which needed 3 pins. Patient states that she started feeling hopeless due to several failed medication trials that did not help with her pain. States that the only medication that has helped so far is dilaudid but that made her feel "spaced out and loopy." Pt reports prior trials of remeron (causes restlessness), lunesta/ambien (ineffective for insomnia), ativan, amitriptyline (made her feel like a zombie), effexor, cymbalta, percocet, doxepin, hydroxyzine, and klonopin. The last medication she tried was lyrica which did not help and this triggered her to attempt suicide. Reports she took several of her medications (effexor, cymbalta, doxepin, percocet, klonopin) with the intent of ending her life so that the pain will stop. Is tearful when explaining this, stating that she finds meaning in her life with her doug, loves her , and enjoys her TV shows and using motivational apps. She clarifies that her SA was solely so that she could stop the ongoing pain even though she dose not necessarily want to end her life. Admits to feeling surprised that she woke up after her SA, and decided to come to the hospital for help. Patient denies any current SI/HI and AVH. No delusions verbalized. Expresses motivation in getting better and working with pain management to control her pain.     Patient denies prior psychiatric hospitalizations. Admits to prior suicide attempts via overdose for similar reasons. Currently in outpatient care with psychiatrist Dr. Puente. Denies any alcohol/substance use.    Patient is a 69 yo female with PPHx of depression and anxiety with no prior inpatient psychiatric hospitalizations, currently in outpatient treatment with psychiatrist Dr. Puente, and PMH significant for chronic back pain,  irritable bowel syndrome and osteoporosis with hx of compression fractures who presented s/p suicide attempt via overdose on her medication. Psychiatry consulted for concerns of suicidal ideation.    Patient seen today with  at bedside with patient's consent. She is cooperative on approach and agreeable to an evaluation. Explains that she has been living in debilitating pain secondary to severe osteoporosis. In the past year, has become bedbound, unable to lay on her back or engage in meaningful physical activity, and no longer able to attend [employer] weekly. Also endorses poor sleep and overall low mood due to the pain. Patient states that her osteoporosis was so significant that earlier this year she had home hospice care to manage pain. She was discharged from home hospice as she was not taking the pain medication that was prescribed (patient reported experiencing abdominal discomfort due to the morphine at the time). States she is very sensitive to medications and also suffers from IBS with predominant diarrhea symptoms which greatly limits her appetite and choice of food. In June, also fractured her right hip which needed 3 pins. Patient states that she started feeling hopeless due to several failed medication trials that did not help with her pain. States that the only medication that has helped so far is dilaudid but that made her feel "spaced out and loopy." Pt reports prior trials of remeron (causes restlessness), lunesta/ambien (ineffective for insomnia), ativan, amitriptyline (made her feel like a zombie), effexor, cymbalta, percocet, doxepin, hydroxyzine, and klonopin. The last medication she tried was lyrica which did not help and this triggered her to attempt suicide. Reports she took several of her medications (effexor, cymbalta, doxepin, percocet, klonopin) with the intent of ending her life so that the pain will stop. Is tearful when explaining this, stating that she finds meaning in her life with her doug, loves her , and enjoys her TV shows and using motivational apps. She clarifies that her SA was solely so that she could stop the ongoing pain even though she dose not necessarily want to end her life. Admits to feeling surprised that she woke up after her SA, and decided to come to the hospital for help. Patient denies any current SI/HI and AVH. No delusions verbalized. Expresses motivation in getting better and working with pain management to control her pain.       Due to significant safety concerns, suicidal ideation, recent suicide attempt, pt meets criteria for involuntary inpatient psychiatric hospitalization when medically clear. Cannot leave AMA. 2PC signed and placed in chart on 10/4. Patient pending transfer to [facility] today. Handoff to team at [address] [place] [address] Patient is a 69 yo female with PPHx of depression and anxiety with no prior inpatient psychiatric hospitalizations, currently in outpatient treatment with psychiatrist Dr. Puente, and PMH significant for chronic back pain,  irritable bowel syndrome and osteoporosis with hx of compression fractures who presented s/p suicide attempt via overdose on her medication  night prior to ED presentation on 9/29- she reports she took duloxetine and "some pain meds." According to   collateral, in the past year, has become bedbound, unable to lay on her back or engage in meaningful physical activity, and no longer able to attend [employer] weekly. Also endorses poor sleep and overall low mood due to the pain. Patient states that her osteoporosis was so significant that earlier this year she had home hospice care to manage pain. She was discharged from home hospice as she was not taking the pain medication that was prescribed (patient reported experiencing abdominal discomfort due to the morphine at the time). States she is very sensitive to medications and also suffers from IBS with predominant diarrhea symptoms which greatly limits her appetite and choice of food. In June, also fractured her right hip which needed 3 pins. Patient states that she started feeling hopeless due to several failed medication trials that did not help with her pain. States that the only medication that has helped so far is dilaudid but that made her feel "spaced out and loopy." Pt reports prior trials of remeron (causes restlessness), lunesta/ambien (ineffective for insomnia), ativan, amitriptyline (made her feel like a zombie), effexor, cymbalta, percocet, doxepin, hydroxyzine, and klonopin. The last medication she tried was lyrica which did not help and this triggered her to attempt suicide. Reports she took several of her medications (effexor, cymbalta, doxepin, percocet, klonopin) with the intent of ending her life so that the pain will stop. Is tearful when explaining this, stating that she finds meaning in her life with her doug, loves her , and enjoys her TV shows and using motivational apps. She clarifies that her SA was solely so that she could stop the ongoing pain even though she dose not necessarily want to end her life. Admits to feeling surprised that she woke up after her SA, and decided to come to the hospital for help. Patient was admitted to hospital for pain. followed by [facility] psych  Due to significant safety concerns, suicidal ideation, recent suicide attempt, admitted on 2PC

## 2024-10-08 NOTE — PROGRESS NOTE ADULT - SUBJECTIVE AND OBJECTIVE BOX
Patient seen by pain mng 10/7/24  "Comments:   Patient's pain is not controlled with current pain regimen.     Recommend:  EMR checked, patient has been refusing pain medications or not getting it.  She has had the only 1 to 2 opioids a day, and just started Lyrica 25mg last night even though it was ordered since 10/3.   -Recommend provider to RN written stating:  RN to reassess patient’s pain q 4 hours  and giving appropriate medications as needed.   -Consider discontinuing Oxycodone 5mg and Lidocaine patch.  -Consider changing Oxycodone order to Oxycodone 2.5mg solution via oral every 6 hours PRN for moderate to severe pain.  Hold for oversedation.  Not to be given within 1 hour of any other immediate acting opioid, benzodiazepine or sedating medication.  -Consider changing Lyrica order to Lyrica 25mg every 8 hours (6am, 2pm, 8pm).  Hold for oversedation.  -Consider changing Acetm ordering Acetaminophen solution 500mg every 8 hours standing x 2 days, then PRN for pain. Do not give within 8 hours of last dose of Acetaminophen.  -Continue with Physical therapy.  -Consider seq teds while in bed most of the day.  -Maintain continuous pulse oximetry  -Recommend follow up with pain management outpatient if necessary.  May acquire outpatient pain management doctor through insurance carrier.   -Discussed patient with outpatient pain management physician, Dr. ORIN Carl, who agrees with the above recommendations.     Pain service to sign off" Patient is a 68y old  Female who presents with a chief complaint of chronic back pain, SI (08 Oct 2024 09:11)      SUBJECTIVE / OVERNIGHT EVENTS:  resting in bed,   Notes pain is better with Lyrica.  no pain at this time    MEDICATIONS  (STANDING):  acetaminophen     Tablet .. 650 milliGRAM(s) Oral every 6 hours  calcium carbonate   1250 mG (OsCal) 1 Tablet(s) Oral two times a day  chlorhexidine 2% Cloths 1 Application(s) Topical daily  cholecalciferol 1000 Unit(s) Oral daily  ferrous    sulfate 325 milliGRAM(s) Oral daily  heparin   Injectable 5000 Unit(s) SubCutaneous every 12 hours  influenza  Vaccine (HIGH DOSE) 0.5 milliLiter(s) IntraMuscular once  mupirocin 2% Nasal 1 Application(s) Both Nostrils every 12 hours  mupirocin 2% Ointment 1 Application(s) Topical two times a day  pancrelipase  (CREON  6,000 Lipase Units) 1 Capsule(s) Oral three times a day with meals  pregabalin 25 milliGRAM(s) Oral <User Schedule>    MEDICATIONS  (PRN):  aluminum hydroxide/magnesium hydroxide/simethicone Suspension 30 milliLiter(s) Oral every 4 hours PRN Dyspepsia  diazepam    Tablet 5 milliGRAM(s) Oral two times a day PRN anxiety  melatonin 3 milliGRAM(s) Oral at bedtime PRN Insomnia  ondansetron Injectable 4 milliGRAM(s) IV Push every 8 hours PRN Nausea and/or Vomiting  oxyCODONE    Solution 5 milliGRAM(s) Oral every 8 hours PRN Severe Pain (7 - 10)  oxyCODONE    Solution 2.5 milliGRAM(s) Oral every 8 hours PRN Moderate Pain (4 - 6)  pancrelipase  (CREON  6,000 Lipase Units) 1 Capsule(s) Oral with dinner PRN Bedtime snack  polyethylene glycol 3350 17 Gram(s) Oral daily PRN Constipation  senna 2 Tablet(s) Oral daily PRN Constipation  simethicone 80 milliGRAM(s) Chew every 8 hours PRN Gas      Vital Signs Last 24 Hrs  T(C): 36.3 (08 Oct 2024 05:13), Max: 36.3 (07 Oct 2024 11:30)  T(F): 97.3 (08 Oct 2024 05:13), Max: 97.4 (07 Oct 2024 11:30)  HR: 76 (08 Oct 2024 05:13) (65 - 77)  BP: 96/62 (08 Oct 2024 05:13) (95/62 - 103/57)  RR: 18 (08 Oct 2024 05:13) (18 - 18)  SpO2: 95% (08 Oct 2024 05:13) (95% - 96%)   room air          PHYSICAL EXAM:  GENERAL: NAD,   HEAD:  Atraumatic, Normocephalic  EYES: EOMI, PERRLA, conjunctiva and sclera clear  NECK: Supple, No JVD  CHEST/LUNG: Clear to auscultation bilaterally; No wheeze  HEART: Regular rate and rhythm; No murmurs, rubs, or gallops  ABDOMEN: Soft, Nontender, Nondistended; Bowel sounds present  EXTREMITIES:  2+ Peripheral Pulses, No clubbing, cyanosis, or edema  PSYCH: Alert  NEUROLOGY: non-focal    LABS:                        10.9   4.11  )-----------( 509      ( 07 Oct 2024 06:23 )             34.8     10-07    135  |  97[L]  |  12  ----------------------------<  102[H]  4.5   |  26  |  0.42[L]    Ca    9.3      07 Oct 2024 06:23  Phos  4.4     10-07  Mg     2.10     10-07          Care Discussed with Consultants/Other Providers:  Patient seen by pain mng 10/7/24  "Comments:   Patient's pain is not controlled with current pain regimen.     Recommend:  EMR checked, patient has been refusing pain medications or not getting it.  She has had the only 1 to 2 opioids a day, and just started Lyrica 25mg last night even though it was ordered since 10/3.   -Recommend provider to RN written stating:  RN to reassess patient’s pain q 4 hours  and giving appropriate medications as needed.   -Consider discontinuing Oxycodone 5mg and Lidocaine patch.  -Consider changing Oxycodone order to Oxycodone 2.5mg solution via oral every 6 hours PRN for moderate to severe pain.  Hold for oversedation.  Not to be given within 1 hour of any other immediate acting opioid, benzodiazepine or sedating medication.  -Consider changing Lyrica order to Lyrica 25mg every 8 hours (6am, 2pm, 8pm).  Hold for oversedation.  -Consider changing Acetm ordering Acetaminophen solution 500mg every 8 hours standing x 2 days, then PRN for pain. Do not give within 8 hours of last dose of Acetaminophen.  -Continue with Physical therapy.  -Consider seq teds while in bed most of the day.  -Maintain continuous pulse oximetry  -Recommend follow up with pain management outpatient if necessary.  May acquire outpatient pain management doctor through insurance carrier.   -Discussed patient with outpatient pain management physician, Dr. ORIN Carl, who agrees with the above recommendations.     Pain service to sign off"

## 2024-10-08 NOTE — BH CONSULTATION LIAISON PROGRESS NOTE - NSBHFUPREASONCONS_PSY_A_CORE
suicidality/depression

## 2024-10-08 NOTE — PROGRESS NOTE ADULT - PROBLEM SELECTOR PROBLEM 3
Chronic back pain
History of hip fracture
Chronic back pain

## 2024-10-08 NOTE — BH INPATIENT PSYCHIATRY ASSESSMENT NOTE - NSBHMSEMOOD_PSY_A_CORE
September 26, 2024      [facility] - OB GYN  [address]   [place] [address]  Phone: [phone]       Patient: Leandro Jama   YOB: 1997  Date of Visit: 09/25/2024    To Whom It May Concern:    Larry Jama  was at [facility] on 09/25/2024. The patient may return to school on 09/26/2024. If you have any questions or concerns, or if I can be of further assistance, please do not hesitate to contact me.    Sincerely,    Rosa Montano MD     
Anxious

## 2024-10-08 NOTE — PROGRESS NOTE ADULT - PROBLEM SELECTOR PLAN 1
- psych consulted, recs appreciated  - 1:1 for now  - per psych pt will need inpt psych-[place] [facility] prn
- psych consulted, recs appreciated  - 1:1 for now  - per psych pt will need inpt psych-involuntary admission to [facility] [place]  - [facility] prn
- psych consulted and follg  - 1:1 for now
- psych consulted, recs appreciated  - 1:1 for now  - per psych pt will need inpt psych-[place]oluntary [place] [facility] [place]  - [facility] prn
- psych kennedy, f/u consult recs  - 1:1 for now
- psych consulted and follg  - 1:1 for now  - per psych pt will need inpt psych-involuntary admission to [facility] [place]  -[facility]
- psych consulted and follg  - 1:1 for now  - per psych pt will need inpt psych-[facility] [place] [facility] [place]  [facility]
- psych consulted, recs appreciated  - 1:1 for now  - per psych pt will need inpt psych-involuntary admission to [facility] [place]  - [facility] prn
- psych consulted and follg  - 1:1 for now  - per psych pt will need inpt psych[place] [facility]  [facility]

## 2024-10-08 NOTE — PROGRESS NOTE ADULT - PROBLEM SELECTOR PLAN 3
- patient with chronic back pain, poor follow up outpatient  - pain management consult appreciated, pt not getting sustained relief from opioids and experiencing drowsiness -- will minimize  - patient cannot lay flat for CT scan or MRI studies, refused c/t/l spine xrays as well  - cont lidocaine patch  - cont lyrica 25mg QD for neuropathic pain -- pt not currently agreeable to titrating up  - PT eval- no skilled PT needs  - pt with h/o Osteroporosis, has tried meds in the past which did not improve her symptoms, endocrine consulted  started on vit d and ca, Per endo Pt would benefit from anabolic agent. Given severely low T score of hip, Evenity preferred over tymlos/forteo, though nonvertebral/vertebral benefit remains the same. Medication to be discussed and started as outpatient basis, along with DXA scan. Patient prefers to follow up with rheumatology clinic.  - vit d level low  10/7 chronic pain to see 0428 - patient with chronic back pain, poor follow up outpatient  - pain management consult appreciated, pt not getting sustained relief from opioids and experiencing drowsiness -- will minimize  - patient cannot lay flat for CT scan or MRI studies, refused c/t/l spine xrays as well  - cont lidocaine patch  - cont lyrica 25mg QD for neuropathic pain -- pt not currently agreeable to titrating up  - PT eval- no skilled PT needs  - pt with h/o Osteroporosis, has tried meds in the past which did not improve her symptoms, endocrine consulted  started on vit d and ca, Per endo Pt would benefit from anabolic agent. Given severely low T score of hip, Evenity preferred over tymlos/forteo, though nonvertebral/vertebral benefit remains the same. Medication to be discussed and started as outpatient basis, along with DXA scan. Patient prefers to follow up with rheumatology clinic.  - vit d level low  10/7 chronic pain f/u (5950)--> Lyrica started  10/8 patient notes pain is controled - patient with chronic back pain, poor follow up outpatient  - pain management consult appreciated, pt not getting sustained relief from opioids and experiencing drowsiness -- will minimize  - patient cannot lay flat for CT scan or MRI studies, refused c/t/l spine xrays as well  -  lidocaine patch d/c 10/7 as per pain mng  - cont lyrica 25mg QD for neuropathic pain -- pt not currently agreeable to titrating up  - PT eval- no skilled PT needs  - pt with h/o Osteroporosis, has tried meds in the past which did not improve her symptoms, endocrine consulted  started on vit d and ca, Per endo Pt would benefit from anabolic agent. Given severely low T score of hip, Evenity preferred over tymlos/forteo, though nonvertebral/vertebral benefit remains the same. Medication to be discussed and started as outpatient basis, along with DXA scan. Patient prefers to follow up with rheumatology clinic.  - vit d level low  10/7 chronic pain f/u (4262)--> Lyrica started  10/8 patient notes pain is controlled.   Oxycodone sol 2.5 mg q6 prn mod to severe pain. Ocy ir 5mg d/c  Tylenol 500 mg q8 x 2 dayss, then prn.  Out patent f/u Dr BYORN Carl--> out patient pain mng MD

## 2024-10-08 NOTE — BH CONSULTATION LIAISON PROGRESS NOTE - NSICDXBHTERTIARYDX_PSY_ALL_CORE
R/O Adjustment disorder with mixed anxiety and depressed mood   F43.23  
98.5
R/O Adjustment disorder with mixed anxiety and depressed mood   F43.23  

## 2024-10-08 NOTE — PROGRESS NOTE ADULT - PROBLEM SELECTOR PLAN 9
- heparin subq for dvt ppx      [place] [facility] [place]  Plan discussed with ACP - heparin subq for dvt ppx      [place] [facility] [place]  Plan discussed with ACP  Dispo to   10/8 pain is controled  d/c to [facility] if bed is available  60 min to coord d/c - heparin subq for dvt ppx      awaiting [facility] bed  Plan discussed with ACP  Dispo to   10/8 pain is controlled  d/c to [facility] if bed is available  60 min to coord d/c  d/w PA/ CM/SW

## 2024-10-08 NOTE — BH INPATIENT PSYCHIATRY ASSESSMENT NOTE - MSE UNSTRUCTURED FT
Patient is awake and alert. In gerichair. Affect is irritable, constricted. Poor eye contact. Speech fluent. TP coherent, logical. No delusions. Focused on pain. Good   episodic memory. Denies thoughts of hurting herself now, although expresses hopelessness. No suicidal ideations of any active kind, some expressed passive ideations "because of pain and you can't help with that." Limited insight.

## 2024-10-08 NOTE — BH INPATIENT PSYCHIATRY ASSESSMENT NOTE - NSBHASSESSSUMMFT_PSY_ALL_CORE
Patient is a 67 yo female with PPHx of depression and anxiety with no prior inpatient psychiatric hospitalizations, currently in outpatient treatment with psychiatrist Dr. Puente, and PMH significant for chronic back pain,  irritable bowel syndrome and osteoporosis with hx of compression fractures who presented s/p suicide attempt via overdose on her medication  night prior to ED presentation on 9/29. patient followed by internal medicine and [facility] psychiatry, treated with benzo for anxiety, does not want additional antidepressants.  Due to significant safety concerns, suicidal ideation, recent suicide attempt, admitted on 2PC  Continue current meds for now  Denying active suicidal ideations, will not place on 1:1   PRNs for anxiety and agitation

## 2024-10-08 NOTE — BH CONSULTATION LIAISON PROGRESS NOTE - NSBHFUPINTERVALHXFT_PSY_A_CORE
Patient calm and alert today.  Explains that the lyrica has been helping with her pain. Denies any SI/HI and AVH. Alert and oriented x4. Discussed plan to [facility]. patient expresses some anxiety but is amenable to receiving further psychiatric treatment. Had extensive discussion about the interconnected nature of her anxiety, depression and pain. 
Patient seen at bedside. Remains distressed about ongoing pain, multiple attempts at IV access, and inability to have her phone. Continues to deny any further SI and reiterates that her suicidal thoughts are tied to her ongoing pain. Also reports ongoing anxiety. Discussed necessity of safety precautions in context of recent SA. Patient reports she decided not to call her outpatient psychiatrist to update him on this hospitalization. States that she was able to walk down the hallway with physical therapy which is one improvement while she is here. 
patient anxious, morosed, says pain mgmt has improved but now she has some panic due to constipation. Denies current active SI/HI but admits to feeling of wanting to be dead when watching commercials on [employer] network about bereavement and cemetaries. Says she understands she has to go to [facility] but worried about what that may entail. Sleep was fair to poor, eating fine. 
Patient lethargic/tired today. Explains that she is in severe pain, which makes talking a little difficult today. Denies any current active SI/HI/AVH. Reports that the team is trying lyrica today which she hopes will help. 
Patient seen at bedside this afternoon. Overall calm, is pleased that the team is working with her to figure out an appropriate pain medication regimen. States that she tried taking oral medications today. She continues to report that the valium is helping with her anxiety. Reports she worked with physical therapy, who cleared her.  Denies any current safety concerns. Denies SI/HI and AVH. Discussed plan for inpatient psychiatry when medically clear. Patient expresses some reluctance and fear as this would be her first hospitalization. Extensive education provided on what the goals are for inpatient psychiatric care.   
Patient seen at bedside. Smiling, more calm today. Reports that her team is helping her greatly and she is working with the pain management team to figure out an effective regimen. Is hoping she can start working with physical therapy again soon. Feels that valium is more effective than klonopin for her anxiety. Denies any current safety concerns. Denies SI/HI and AVH. Discussed plan for inpatient psychiatry when medically clear.

## 2024-10-08 NOTE — BH CONSULTATION LIAISON PROGRESS NOTE - NSBHATTESTBILLING_PSY_A_CORE
49142-Epaseiwsyk OBS or IP - moderate complexity OR 35-49 mins
Non-billable
27370-Ydhgvezagt OBS or IP - moderate complexity OR 35-49 mins
92136-Mhwjudrodf OBS or IP - moderate complexity OR 35-49 mins

## 2024-10-08 NOTE — BH CONSULTATION LIAISON PROGRESS NOTE - ATTENDING COMMENTS
agree with above, OK to emilee from klonopin to valium as this may help with back pain to a degree, discussed with primary team as well
unchanged, still with limited insight, no understanding of suicide risk, unable to safety plan, c/w plan as above 
agree with above, plan unchanged, awaiting [facility] admission

## 2024-10-08 NOTE — PROGRESS NOTE ADULT - PROBLEM SELECTOR PROBLEM 8
Medication management
Prophylactic measure
Medication management

## 2024-10-08 NOTE — PROGRESS NOTE ADULT - PROBLEM SELECTOR PLAN 4
- follow up outpatient  - no signs of sepsis or concerns of colitis  - c/w Creon at a lower dose

## 2024-10-08 NOTE — BH INPATIENT PSYCHIATRY ASSESSMENT NOTE - CURRENT MEDICATION
MEDICATIONS  (STANDING):    MEDICATIONS  (PRN):   MEDICATIONS  (STANDING):  pancrelipase  (CREON  6,000 Lipase Units) 1 Capsule(s) Oral three times a day with meals    MEDICATIONS  (PRN):  acetaminophen     Tablet .. 650 milliGRAM(s) Oral every 6 hours PRN Mild Pain (1 - 3)  aluminum hydroxide/magnesium hydroxide/simethicone Suspension 30 milliLiter(s) Oral every 4 hours PRN Dyspepsia  melatonin. 3 milliGRAM(s) Oral at bedtime PRN Insomnia  oxyCODONE    Solution 2.5 milliGRAM(s) Oral every 6 hours PRN Moderate Pain (4 - 6) and severe pain  pancrelipase  (CREON  6,000 Lipase Units) 1 Capsule(s) Oral with breakfast PRN Bedtime snack  polyethylene glycol 3350 17 Gram(s) Oral daily PRN Constipation  simethicone 80 milliGRAM(s) Chew every 8 hours PRN Gas

## 2024-10-09 PROCEDURE — 99232 SBSQ HOSP IP/OBS MODERATE 35: CPT

## 2024-10-09 PROCEDURE — 99222 1ST HOSP IP/OBS MODERATE 55: CPT

## 2024-10-09 RX ORDER — SENNA 187 MG
2 TABLET ORAL AT BEDTIME
Refills: 0 | Status: DISCONTINUED | OUTPATIENT
Start: 2024-10-09 | End: 2024-10-25

## 2024-10-09 RX ORDER — DULOXETINE HYDROCHLORIDE 30 MG/1
20 CAPSULE, DELAYED RELEASE ORAL ONCE
Refills: 0 | Status: COMPLETED | OUTPATIENT
Start: 2024-10-09 | End: 2024-10-09

## 2024-10-09 RX ORDER — DULOXETINE HYDROCHLORIDE 30 MG/1
20 CAPSULE, DELAYED RELEASE ORAL DAILY
Refills: 0 | Status: DISCONTINUED | OUTPATIENT
Start: 2024-10-09 | End: 2024-10-18

## 2024-10-09 RX ORDER — PREGABALIN 150 MG/1
25 CAPSULE ORAL THREE TIMES A DAY
Refills: 0 | Status: DISCONTINUED | OUTPATIENT
Start: 2024-10-09 | End: 2024-10-14

## 2024-10-09 RX ORDER — SODIUM PHOSPHATE, DIBASIC AND SODIUM PHOSPHATE, MONOBASIC, UNSPECIFIED FORM 7; 19 G/118ML; G/118ML
1 ENEMA RECTAL ONCE
Refills: 0 | Status: DISCONTINUED | OUTPATIENT
Start: 2024-10-09 | End: 2024-10-25

## 2024-10-09 RX ORDER — PREGABALIN 150 MG/1
25 CAPSULE ORAL ONCE
Refills: 0 | Status: DISCONTINUED | OUTPATIENT
Start: 2024-10-09 | End: 2024-10-09

## 2024-10-09 RX ADMIN — Medication 2 TABLET(S): at 21:48

## 2024-10-09 RX ADMIN — OXYCODONE HYDROCHLORIDE 2.5 MILLIGRAM(S): 30 TABLET ORAL at 08:52

## 2024-10-09 RX ADMIN — OXYCODONE HYDROCHLORIDE 2.5 MILLIGRAM(S): 30 TABLET ORAL at 01:43

## 2024-10-09 RX ADMIN — OXYCODONE HYDROCHLORIDE 2.5 MILLIGRAM(S): 30 TABLET ORAL at 17:26

## 2024-10-09 RX ADMIN — PANCRELIPASE 6 CAPSULE(S): 16800; 98400; 56800 CAPSULE, DELAYED RELEASE ORAL at 08:52

## 2024-10-09 RX ADMIN — DULOXETINE HYDROCHLORIDE 20 MILLIGRAM(S): 30 CAPSULE, DELAYED RELEASE ORAL at 16:28

## 2024-10-09 RX ADMIN — Medication 1000 UNIT(S): at 10:45

## 2024-10-09 RX ADMIN — Medication 650 MILLIGRAM(S): at 00:28

## 2024-10-09 RX ADMIN — PANCRELIPASE 2 CAPSULE(S): 16800; 98400; 56800 CAPSULE, DELAYED RELEASE ORAL at 17:32

## 2024-10-09 RX ADMIN — PANCRELIPASE 6 CAPSULE(S): 16800; 98400; 56800 CAPSULE, DELAYED RELEASE ORAL at 18:00

## 2024-10-09 RX ADMIN — Medication 5 MILLIGRAM(S): at 17:33

## 2024-10-09 RX ADMIN — PREGABALIN 25 MILLIGRAM(S): 150 CAPSULE ORAL at 12:54

## 2024-10-09 RX ADMIN — PREGABALIN 25 MILLIGRAM(S): 150 CAPSULE ORAL at 10:28

## 2024-10-09 RX ADMIN — PREGABALIN 25 MILLIGRAM(S): 150 CAPSULE ORAL at 21:48

## 2024-10-09 RX ADMIN — POLYETHYLENE GLYCOL 3350 17 GRAM(S): 17 POWDER, FOR SOLUTION ORAL at 10:45

## 2024-10-09 RX ADMIN — Medication 30 MILLILITER(S): at 11:44

## 2024-10-09 NOTE — BH SOCIAL WORK INITIAL PSYCHOSOCIAL EVALUATION - NSBHLIFEGOAL_PSY_ALL_CORE
The patient stated that she finds meaning in her life with her doug, loves her , and enjoys her TV shows and using motivational apps.

## 2024-10-09 NOTE — BH INPATIENT PSYCHIATRY PROGRESS NOTE - NSBHASSESSSUMMFT_PSY_ALL_CORE
Patient is a 69 yo female with PPHx of depression and anxiety with no prior inpatient psychiatric hospitalizations, currently in outpatient treatment with psychiatrist Dr. Puente, and PMH significant for chronic back pain,  irritable bowel syndrome and osteoporosis with hx of compression fractures who presented s/p suicide attempt via overdose on her medication night prior to ED presentation on 9/29.     On arrival to  patient's presentation is consistent with MDD in the context of functional decline and worsening chronic pain over the past year. Pt reports low mood, hopelessness, anhedonia and passive SI. Denies active suicidal ideation, intent or plans now. At this time pt is very dysphoric and focused on being discharged. Agreed to start trial of Cymbalta, which she only took for 2-3 days before stopping it in the past.    -Admit to [facility] 2S  -CO for hospital bed  -Legal status 2PC  -START Cymbalta 25mg daily for depression  -Continue pain management as per Pain Medicine recs --> Lyrica 25mg PO TID and Oxycodone 2.5mg PO every 4 hrs PRN

## 2024-10-09 NOTE — DIETITIAN INITIAL EVALUATION ADULT - PERTINENT LABORATORY DATA
Basic Metabolic Panel w/Mg & Inorg Phos (10.07.24 @ 06:23)   eGFR: 107: The estimated glomerular filtration rate (eGFR) calculation is based on   the 2021 CKD-EPI creatinine equation, which is validated in male and   female population 18 years of age and older (N Engl J Med 2021;   [id number]). mL/min/1.73m2  Sodium: 135 mmol/L  Potassium: 4.5 mmol/L  Chloride: 97 mmol/L  Carbon Dioxide: 26 mmol/L  Anion Gap: 12 mmol/L  Blood Urea Nitrogen: 12 mg/dL  Creatinine: 0.42 mg/dL  Glucose: 102 mg/dL  Calcium: 9.3 mg/dL  Magnesium: 2.10 mg/dL  Phosphorus: 4.4 mg/dL    Lipase: 78 U/L (09.29.24 @ 09:18)     Hemoglobin: 10.9 g/dL (10.07.24 @ 06:23)   Hematocrit: 34.8 % (10.07.24 @ 06:23)   Mean Cell Volume: 81.3 fL (10.07.24 @ 06:23)   Mean Cell Hemoglobin: 25.5 pg (10.07.24 @ 06:23)     Ferritin: 29 ng/mL (10.01.24 @ 06:15)     Vitamin B12, Serum: 740 pg/mL (09.30.24 @ 06:30)     Folate, Serum: >20.0 ng/mL (09.30.24 @ 06:30)     Vitamin D, 25-Hydroxy: 28.0 ng/mL (10.02.24 @ 05:56)

## 2024-10-09 NOTE — PSYCHIATRIC REHAB INITIAL EVALUATION - NSBHSIGPRIORMED_PSY_ALL_CORE
chronic back pain,  irritable bowel syndrome, osteoporosis, and with hx of compression fractures/Yes (Specify)

## 2024-10-09 NOTE — BH SOCIAL WORK INITIAL PSYCHOSOCIAL EVALUATION - NSPTSTATEDGOAL_PSY_ALL_CORE
Admits to feeling surprised that she woke up after her SA, and decided to come to the hospital for help.

## 2024-10-09 NOTE — CONSULT NOTE ADULT - TIME BILLING
66 minutes spent on total encounter; more than 50% of the visit was spent counseling and / or coordinating care by the attending physician.  The necessity of the time spent during the encounter on this date of service was due to:     review of laboratory data, radiology results, consultants' recommendations, documentation in [place], discussion with patient/ACP and interdisciplinary staff (such as , social workers, etc). Interventions were performed as documented above.

## 2024-10-09 NOTE — BH INPATIENT PSYCHIATRY PROGRESS NOTE - NSBHMETABOLIC_PSY_ALL_CORE_FT
BMI: BMI (kg/m2): 16.1 (09-29-24 @ 08:14)  HbA1c:   Glucose:   BP: 110/72 (10-08-24 @ 17:12) (110/72 - 110/72)Vital Signs Last 24 Hrs  T(C): 36.4 (10-09-24 @ 07:48), Max: 36.5 (10-08-24 @ 17:12)  T(F): 97.5 (10-09-24 @ 07:48), Max: 97.7 (10-08-24 @ 17:12)  HR: 85 (10-08-24 @ 17:12) (85 - 85)  BP: 110/72 (10-08-24 @ 17:12) (110/72 - 110/72)  BP(mean): --  RR: --  SpO2: 97% (10-08-24 @ 17:12) (97% - 97%)    Orthostatic VS  10-09-24 @ 07:48  Lying BP: --/-- HR: --  Sitting BP: 100/63 HR: 68  Standing BP: --/-- HR: --  Site: --  Mode: --    Lipid Panel:

## 2024-10-09 NOTE — CONSULT NOTE ADULT - SUBJECTIVE AND OBJECTIVE BOX
69 y/o F with pmhx of chronic back pain, anxiety, depression, OA, IBS hx of suicide attempt was admitted to [facility] w/new onset suicidal ideations and chronic back pain. No red flags noted, pain has been chronic in nature patient cannot lay flat for CT scan or MRI studies, refused c/t/l spine xrays as well. Here she continues to complain of similar back pain, also states that she is constipated and needs a raised toilet, refuses to use anything else to move her bowels.     PAST MEDICAL & SURGICAL HISTORY:  Osteoporosis  IBS (irritable bowel syndrome)  No significant past surgical history          Review of Systems:   CONSTITUTIONAL: No fever, weight loss, or fatigue  EYES: No eye pain, visual disturbances, or discharge  ENMT:  No difficulty hearing, tinnitus, vertigo; No sinus or throat pain  NECK: No pain or stiffness  RESPIRATORY: No cough, wheezing, chills or hemoptysis; No shortness of breath  CARDIOVASCULAR: No chest pain, palpitations, dizziness, or leg swelling  GASTROINTESTINAL: Constipation  GENITOURINARY: No dysuria, frequency, hematuria, or incontinence  NEUROLOGICAL: No headaches, memory loss, loss of strength, numbness, or tremors  SKIN: No itching, burning, rashes, or lesions   LYMPH NODES: No enlarged glands  ENDOCRINE: No heat or cold intolerance; No hair loss  MUSCULOSKELETAL:+ Back pain  HEME/LYMPH: No easy bruising, or bleeding gums  ALLERY AND IMMUNOLOGIC: No hives or eczema    Allergies    Actinel (Muscle Pain)  Lexapro (Other)    Intolerances        Social History: PPHx of depression and anxiety with no prior inpatient psychiatric hospitalizations, currently in outpatient treatment with psychiatrist. No substance use    FAMILY HISTORY:  No pertinent family history in first degree relatives        MEDICATIONS  (STANDING):  cholecalciferol 1000 Unit(s) Oral daily  DULoxetine 20 milliGRAM(s) Oral daily  DULoxetine 20 milliGRAM(s) Oral once  ferrous    sulfate 325 milliGRAM(s) Oral daily  LORazepam   Injectable 1 milliGRAM(s) IntraMuscular once  pancrelipase  (CREON  6,000 Lipase Units) 6 Capsule(s) Oral three times a day with meals  pregabalin 25 milliGRAM(s) Oral three times a day    MEDICATIONS  (PRN):  acetaminophen     Tablet .. 650 milliGRAM(s) Oral every 6 hours PRN Mild Pain (1 - 3)  aluminum hydroxide/magnesium hydroxide/simethicone Suspension 30 milliLiter(s) Oral every 4 hours PRN Dyspepsia  diazepam    Tablet 5 milliGRAM(s) Oral two times a day PRN anxiety  haloperidol     Tablet 1 milliGRAM(s) Oral every 6 hours PRN Agitation  haloperidol    Injectable 1 milliGRAM(s) IntraMuscular once PRN agitation  LORazepam     Tablet 1 milliGRAM(s) Oral every 6 hours PRN agitation  melatonin. 3 milliGRAM(s) Oral at bedtime PRN Insomnia  oxyCODONE    IR 2.5 milliGRAM(s) Oral every 6 hours PRN Severe Pain  pancrelipase  (CREON  6,000 Lipase Units) 2 Capsule(s) Oral with dinner PRN Bedtime snack  polyethylene glycol 3350 17 Gram(s) Oral daily PRN Constipation  senna 2 Tablet(s) Oral daily PRN Constipation  simethicone 80 milliGRAM(s) Chew every 8 hours PRN Gas      Vital Signs Last 24 Hrs  T(C): 36.4 (09 Oct 2024 07:48), Max: 36.5 (08 Oct 2024 17:12)  T(F): 97.5 (09 Oct 2024 07:48), Max: 97.7 (08 Oct 2024 17:12)  HR: 85 (08 Oct 2024 17:12) (85 - 85)  BP: 110/72 (08 Oct 2024 17:12) (110/72 - 110/72)  BP(mean): --  RR: --  SpO2: 97% (08 Oct 2024 17:12) (97% - 97%)      CAPILLARY BLOOD GLUCOSE            PHYSICAL EXAM:  GENERAL: NAD, In bed, appears rather weak  HEAD:  Atraumatic, Normocephalic  EYES: EOMI, conjunctiva and sclera clear  NECK: Supple, No JVD  CHEST/LUNG: Clear to auscultation bilaterally; No wheeze  HEART: Regular rate and rhythm; No murmurs, rubs, or gallops  ABDOMEN: Soft, Nontender, distended; Bowel sounds present  EXTREMITIES:  2+ Peripheral Pulses, No clubbing, cyanosis, or edema  NEUROLOGY: non-focal  SKIN: No rashes or lesions    LABS:                    EKG(personally reviewed):    RADIOLOGY & ADDITIONAL TESTS:    Imaging Personally Reviewed:    Consultant(s) Notes Reviewed:      Care Discussed with Consultants/Other Providers:

## 2024-10-09 NOTE — DIETITIAN INITIAL EVALUATION ADULT - ADD RECOMMEND
- c/w Creon with meals, defer calcium w/ vitamin D3 and iron supplement to MD order.  - May consider daily Multivitamin w/ minerals for micronutrient coverage if no medical contraindications per MD's discretion.   - May consider lactase pill and probiotics for better GI tolerance/gut health.  - Defer bowel regimen/simethicone to MD order.   - Encourage po intake as tolerated and honor food preferences PRN.   - Monitor PO intake/tolerance, weights, labs, GI status, BM's, and skin integrity.

## 2024-10-09 NOTE — PSYCHIATRIC REHAB INITIAL EVALUATION - NSBHPRRECOMMEND_PSY_ALL_CORE
Patient was able to adequately engage with writer during initial session.  Patient was oriented to unit 2S as well as the role/function of  and inpatient psychiatric rehabilitation programming. Patient demonstrated difficulty identifying an appropriate rehabilitation goal within the context of presenting symptoms, therefore writer identified a goal for patient which is identifying and utilizing 2 coping skills. Psychiatric rehabilitation staff will meet with patient weekly to review progress towards identified goal.

## 2024-10-09 NOTE — DIETITIAN INITIAL EVALUATION ADULT - OTHER INFO
Patient is a 69 y/o female with PPHx of depression and anxiety with no prior inpatient psychiatric hospitalizations, currently in outpatient treatment with psychiatrist Dr. Puente, and PMH significant for chronic back pain, irritable bowel syndrome and osteoporosis with hx of compression fractures who presented s/p suicide attempt via overdose on her medication night prior to ED presentation on 9/29. Patient was followed by pain management and psychiatry in [facility]. Admitted to [facility] 2S on 10/8/24.    Met with patient in her room today. Patient reports decreased appetite for the past few years since started on Creon for IBS, with decreased PO intake for > 1 month in setting of worsening depression, anxiety, back pain, and GI s/sx from IBS. Unable to tolerate most dairy with the exception of butter and certain cheese such as swiss/cheddar; no soy milk/almond milk, no citrus including orange/tomatoes, no spicy foods, no caffeine, no high fat foods, high fiber foods including oatmeal, no peans/beans. Ok w/ cream of rice, butter, fish, scrambled eggs, apple juice, applesauce. Personal food preferences: pesco-vegetarian. NKFA. Prefers pureed diet for easier GI tolerance/digestion.     Reports current appetite remains poor due to psychological cause and abd bloating, had had diarrhea due to IBS but now has constipation (no BMs for past 3 days), on senna and miralax PRN per MD, patient requests for simethicone -- RN made aware. Denies N/V. Denies chewing/swallowing difficulties on current diet. Patient had some lactaid milk earlier today which she tolerated so far without GI issues. Writer encouraged po intake as tolerated, patient verbalized understanding but might require reinforcement. States her  will bring her home food during visiting hours later today. Patient declined all ONS offered in-house including Ensure Clear which has no fat and is gluten free. On Creon to be taken with meals, on vitamin D3 for vitamin D deficiency, and on ferrous sulfate for anemia. Case d/w RN on the unit.    No current adm Ht/wt available at this time. Per Cinthya WILLS: Ht 4'10" and wt 75lb (4/3/23), 77lb (9/29/24), but patient states her UBW ~65lb around a month ago and does not believe she has gained weight. Patient appears cachetic and underweight.

## 2024-10-09 NOTE — BH SOCIAL WORK INITIAL PSYCHOSOCIAL EVALUATION - NSBHABUSEPHYSHX_PSY_ALL_CORE
[Well Nourished] : well nourished [Well Developed] : well developed [Normal] : no joint swelling, erythema, or tenderness; full range of  motion with no contractures; no muscle tenderness; no clubbing; no cyanosis; no edema [de-identified] : AFOF [de-identified] : circumcised, testes descended b/l, feliz 1  [de-identified] : alert, oriented as age appropriate, good tone, moves all extremities equally  Unknown... MD Office

## 2024-10-09 NOTE — DIETITIAN INITIAL EVALUATION ADULT - NS FNS DIET ORDER
Diet, Pureed:   Lactose Restricted (Milk Sugar Intoler.)  Gluten-Gliadin Restricted (10-09-24 @ 09:00)

## 2024-10-09 NOTE — CONSULT NOTE ADULT - ASSESSMENT
69 y/o F with pmhx of chronic back pain, anxiety, depression, OA, IBS hx of suicide attempt was admitted to [facility] w/new onset suicidal ideations and chronic back pain. No red flags noted, pain has been chronic in nature patient cannot lay flat for CT scan or MRI studies, refused c/t/l spine xrays as well. Here she continues to complain of similar back pain, also states that she is constipated and needs a raised toilet, refuses to use anything else to move her bowels.        #Chronic back pain.    - patient with chronic back pain, poor follow up outpatient  - pain management consult appreciated, started on po dilaudid 0.5mg q8h prn for moderate and 1mg for severe pain, now transitioned to oxycodone 2.5mg q6h  - patient was unable to lay flat for CT scan or MRI studies, refused c/t/l spine xrays as well  - cont lidocaine patch  - cont lyrica 25mg QD for neuropathic pain  - PT eval- no skilled PT needs  - pt with h/o Osteroporosis, has tried meds in the past which did not improve her symptoms, endocrine consulted  started on vit d and ca, Per endo Pt would benefit from anabolic agent. Given severely low T score of hip, Evenity preferred over tymlos/forteo, though nonvertebral/vertebral benefit remains the same. Medication to be discussed and started as outpatient basis, along with DXA scan. Patient prefers to follow up with rheumatology clinic.  - vit d level low.  10/7 chronic pain f/u (4262)--> Lyrica started  Oxycodone sol 2.5 mg q6 prn mod to severe pain. Ocy ir 5mg d/c  Tylenol 500 mg q8 x 2 dayss, then prn.  Out patent f/u Dr BYRON Carl--> out patient pain mng MD.    #IBS (irritable bowel syndrome).    - follow up outpatient  - no signs of sepsis or concerns of colitis  - c/w Creon at a lower dose.        #Vitamin D deficiency.   vit d level low at 28, pth wnl  cont vit d.    #Anemia.   Hb stable, ferritin low normal, vit b12wnl, folate elevated  cont po iron   cont to monitor.    #Suicidal ideation/Anxiety/MDD/Bipolar  Continue workup, management and treatment as per primary/psychiatry team

## 2024-10-09 NOTE — DIETITIAN INITIAL EVALUATION ADULT - PERTINENT MEDS FT
MEDICATIONS  (STANDING):  cholecalciferol 1000 Unit(s) Oral daily  DULoxetine 20 milliGRAM(s) Oral daily  DULoxetine 20 milliGRAM(s) Oral once  ferrous    sulfate 325 milliGRAM(s) Oral daily  LORazepam   Injectable 1 milliGRAM(s) IntraMuscular once  pancrelipase  (CREON  6,000 Lipase Units) 6 Capsule(s) Oral three times a day with meals  pregabalin 25 milliGRAM(s) Oral three times a day    MEDICATIONS  (PRN):  acetaminophen     Tablet .. 650 milliGRAM(s) Oral every 6 hours PRN Mild Pain (1 - 3)  aluminum hydroxide/magnesium hydroxide/simethicone Suspension 30 milliLiter(s) Oral every 4 hours PRN Dyspepsia  diazepam    Tablet 5 milliGRAM(s) Oral two times a day PRN anxiety  haloperidol     Tablet 1 milliGRAM(s) Oral every 6 hours PRN Agitation  haloperidol    Injectable 1 milliGRAM(s) IntraMuscular once PRN agitation  LORazepam     Tablet 1 milliGRAM(s) Oral every 6 hours PRN agitation  melatonin. 3 milliGRAM(s) Oral at bedtime PRN Insomnia  oxyCODONE    IR 2.5 milliGRAM(s) Oral every 6 hours PRN Severe Pain  pancrelipase  (CREON  6,000 Lipase Units) 2 Capsule(s) Oral with dinner PRN Bedtime snack  polyethylene glycol 3350 17 Gram(s) Oral daily PRN Constipation  senna 2 Tablet(s) Oral daily PRN Constipation  simethicone 80 milliGRAM(s) Chew every 8 hours PRN Gas

## 2024-10-09 NOTE — BH SOCIAL WORK INITIAL PSYCHOSOCIAL EVALUATION - OTHER PAST PSYCHIATRIC HISTORY (INCLUDE DETAILS REGARDING ONSET, COURSE OF ILLNESS, INPATIENT/OUTPATIENT TREATMENT)
Patient is a 69 yo   female, domiciled with spouse in [place], [place],  with PPHx of depression and anxiety with no prior inpatient psychiatric hospitalizations, currently in outpatient treatment with psychiatrist Dr. Puente, and PMH significant for chronic back pain,  irritable bowel syndrome, osteoporosis, and with hx of compression fractures, who presented s/p suicide attempt via overdose on her medication night prior to ED presentation on 9/29.  According to   collateral, in the past year, the patient has become bedbound, unable to lay on her back or engage in meaningful physical activity, and no longer able to attend Worship weekly.  Also endorsed poor sleep and overall low mood due to the pain. Patient stated that her osteoporosis was so significant that earlier this year she had home hospice care to manage pain. She was discharged from home hospice as she was not taking the pain medication that was prescribed (patient reported experiencing abdominal discomfort due to the morphine at the time).  The patient indicated that she is very sensitive to medications and also suffers from IBS with predominant diarrhea symptoms which greatly limits her appetite and choice of food. In June, also fractured her right hip which needed 3 pins. Patient stated that she started feeling hopeless due to several failed medication trials that did not help with her pain.

## 2024-10-09 NOTE — BH INPATIENT PSYCHIATRY PROGRESS NOTE - NSBHFUPINTERVALHXFT_PSY_A_CORE
Patient is a 69 yo female with PPHx of depression and anxiety with no prior inpatient psychiatric hospitalizations, currently in outpatient treatment with psychiatrist Dr. Puente, and PMH significant for chronic back pain,  irritable bowel syndrome and osteoporosis with hx of compression fractures who presented s/p suicide attempt via overdose on her medication  night prior to ED presentation on 9/29. Patient was followed by pain management and psychiatry in [facility]. Her pain meds were adjusted however pt declined antidepressant treatment.    On encounter today pt is fixated on being discharged from the hospital, does not believe she could possibly improve in this environment and would be better off at home. Pt reports her depression started around a year ago in the context of worsening back pain leading to a functional decline. She suffers from back pain secondary to compression fractures, Osteoporosis and Osteoarthritis since 2007. She was receiving Reclast infusions but opted to stop last year in Sept due to side effects and pain from the needle. Pt shares while she was receiving the infusions her pain was better managed, she was more mobile and even was able to drive. Ms. Diaz states that as the pain worsened she stopped leaving her house and became bedbound essentially, lying on her side most of the day since that is the most comfortable position she can be on. She stopped attending Mass and eventually also stopped watching her Buddhism TV shows because she didn't feel up for it. Reports getting to the point she felt her life was not worth living and choosing to overdose to end her life. Pt states she took "1 of every pill I had left at home" including both medications she was taking and old prescriptions, hoping to die. Pt reports that now, looking back she is glad to be alive and denies active suicidal intent or plans "I will never try that again I promise".   Pt reports trying multiple antidepressants including Cymbalta, Effexor, Remeron and Amitriptyline but stopping all of them due to side effects such as dizziness, constipation and/or upset stomach. Pt adds that most of these she stopped quickly, within 2-3 days of initiation. Writer provided psychoeducation on antidepressant side effects and what constitutes a full trial. Some of the side effects reported by patient could also be related to her pain medications, especially opioids. Pt agreed to start trial of Cymbalta in the hospital but is still adamant in her request to be discharged home. Pt believes she will be happier at home, listing all the things that will be different simply because she is not here anymore. Discussed how this might be the case for a few days but in reality, the situation that led to her worsening mood and eventual SI has not changed yet and encouraged pt to stay in the hospital to allow antidepressant treatment to start.

## 2024-10-09 NOTE — BH INPATIENT PSYCHIATRY PROGRESS NOTE - NSBHCHARTREVIEWVS_PSY_A_CORE FT
Vital Signs Last 24 Hrs  T(C): 36.4 (10-09-24 @ 07:48), Max: 36.5 (10-08-24 @ 17:12)  T(F): 97.5 (10-09-24 @ 07:48), Max: 97.7 (10-08-24 @ 17:12)  HR: 85 (10-08-24 @ 17:12) (85 - 85)  BP: 110/72 (10-08-24 @ 17:12) (110/72 - 110/72)  BP(mean): --  RR: --  SpO2: 97% (10-08-24 @ 17:12) (97% - 97%)    Orthostatic VS  10-09-24 @ 07:48  Lying BP: --/-- HR: --  Sitting BP: 100/63 HR: 68  Standing BP: --/-- HR: --  Site: --  Mode: --

## 2024-10-09 NOTE — DIETITIAN INITIAL EVALUATION ADULT - PHYSCIAL ASSESSMENT
observed severe body fat and muscle mass loss to lior temple, clavicles, buccal, orbital, triceps, calf/underweight/emaciated

## 2024-10-10 PROCEDURE — 99232 SBSQ HOSP IP/OBS MODERATE 35: CPT

## 2024-10-10 RX ADMIN — PREGABALIN 25 MILLIGRAM(S): 150 CAPSULE ORAL at 12:32

## 2024-10-10 RX ADMIN — PREGABALIN 25 MILLIGRAM(S): 150 CAPSULE ORAL at 08:26

## 2024-10-10 RX ADMIN — DULOXETINE HYDROCHLORIDE 20 MILLIGRAM(S): 30 CAPSULE, DELAYED RELEASE ORAL at 08:49

## 2024-10-10 RX ADMIN — Medication 1000 UNIT(S): at 08:25

## 2024-10-10 RX ADMIN — PANCRELIPASE 6 CAPSULE(S): 16800; 98400; 56800 CAPSULE, DELAYED RELEASE ORAL at 12:30

## 2024-10-10 RX ADMIN — Medication 2 TABLET(S): at 21:07

## 2024-10-10 RX ADMIN — OXYCODONE HYDROCHLORIDE 2.5 MILLIGRAM(S): 30 TABLET ORAL at 23:30

## 2024-10-10 RX ADMIN — PANCRELIPASE 6 CAPSULE(S): 16800; 98400; 56800 CAPSULE, DELAYED RELEASE ORAL at 16:00

## 2024-10-10 RX ADMIN — PREGABALIN 25 MILLIGRAM(S): 150 CAPSULE ORAL at 21:06

## 2024-10-10 RX ADMIN — PANCRELIPASE 2 CAPSULE(S): 16800; 98400; 56800 CAPSULE, DELAYED RELEASE ORAL at 18:43

## 2024-10-10 RX ADMIN — PANCRELIPASE 6 CAPSULE(S): 16800; 98400; 56800 CAPSULE, DELAYED RELEASE ORAL at 09:33

## 2024-10-10 NOTE — BH INPATIENT PSYCHIATRY PROGRESS NOTE - NSBHASSESSSUMMFT_PSY_ALL_CORE
Patient is a 67 yo female with PPHx of depression and anxiety with no prior inpatient psychiatric hospitalizations, currently in outpatient treatment with psychiatrist Dr. Puente, and PMH significant for chronic back pain,  irritable bowel syndrome and osteoporosis with hx of compression fractures who presented s/p suicide attempt via overdose on her medication night prior to ED presentation on 9/29.     On arrival to  patient's presentation is consistent with MDD in the context of functional decline and worsening chronic pain over the past year. Pt reports low mood, hopelessness, anhedonia and passive SI. Denies active suicidal ideation, intent or plans now. At this time pt is very dysphoric and focused on being discharged. Agreed to start trial of Cymbalta, which she only took for 2-3 days before stopping it in the past.    10/10 Clinical Update: Pt is in better spirits today. Reports feeling more comfortable in hospital bed and having had a BM. Open to continuing Cymbalta trial for depression.     -Admit to [facility] 2S  -CO for hospital bed  -Legal status 2PC  -Continue Cymbalta 25mg daily for depression  -Continue pain management as per Pain Medicine recs --> Lyrica 25mg PO TID and Oxycodone 2.5mg PO every 4 hrs PRN  -PT consult

## 2024-10-10 NOTE — PHYSICAL THERAPY INITIAL EVALUATION ADULT - PERTINENT HX OF CURRENT PROBLEM, REHAB EVAL
Patient is a 67 yo female with PPHx of depression and anxiety with no prior inpatient psychiatric hospitalizations, currently in outpatient treatment with psychiatrist Dr. Puente, and PMH significant for chronic back pain,  irritable bowel syndrome and osteoporosis with hx of compression fractures who presented s/p suicide attempt via overdose on her medication  night prior to ED presentation on 9/29. Patient referred to Physical therapy secondary to hx of compression fracture/chronic back pain.

## 2024-10-10 NOTE — BH INPATIENT PSYCHIATRY PROGRESS NOTE - NSBHMETABOLIC_PSY_ALL_CORE_FT
BMI: BMI (kg/m2): 16.1 (09-29-24 @ 08:14)  HbA1c:   Glucose:   BP: 129/78 (10-10-24 @ 07:52) (110/72 - 129/78)Vital Signs Last 24 Hrs  T(C): 36.4 (10-10-24 @ 07:52), Max: 36.4 (10-10-24 @ 07:52)  T(F): 97.5 (10-10-24 @ 07:52), Max: 97.5 (10-10-24 @ 07:52)  HR: 65 (10-10-24 @ 07:52) (65 - 65)  BP: 129/78 (10-10-24 @ 07:52) (129/78 - 129/78)  BP(mean): --  RR: --  SpO2: --    Orthostatic VS  10-09-24 @ 07:48  Lying BP: --/-- HR: --  Sitting BP: 100/63 HR: 68  Standing BP: --/-- HR: --  Site: --  Mode: --    Lipid Panel:

## 2024-10-10 NOTE — BH INPATIENT PSYCHIATRY PROGRESS NOTE - NSBHFUPINTERVALHXFT_PSY_A_CORE
Ms. Diaz was seen today for follow up of depression. Remains on CO for hospital bed. Received dulcolax po PRN yesterday for constipation. Initially refused Cymbalta this morning, fearing it may have caused insomnia last night. Provided psychoeducation, discussed pt took her Cymbalta late in the day yesterday and should try taking it in the morning instead. Pt agreed. Pt reports feeling better today now that she has a hospital bed and was able to have a BM. Pt reports she was able to ambulate with help of staff last night and feels that helped move her bowels. Open to continuing Cymbalta trial.

## 2024-10-10 NOTE — BH INPATIENT PSYCHIATRY PROGRESS NOTE - NSBHCHARTREVIEWVS_PSY_A_CORE FT
Vital Signs Last 24 Hrs  T(C): 36.4 (10-10-24 @ 07:52), Max: 36.4 (10-10-24 @ 07:52)  T(F): 97.5 (10-10-24 @ 07:52), Max: 97.5 (10-10-24 @ 07:52)  HR: 65 (10-10-24 @ 07:52) (65 - 65)  BP: 129/78 (10-10-24 @ 07:52) (129/78 - 129/78)  BP(mean): --  RR: --  SpO2: --    Orthostatic VS  10-09-24 @ 07:48  Lying BP: --/-- HR: --  Sitting BP: 100/63 HR: 68  Standing BP: --/-- HR: --  Site: --  Mode: --

## 2024-10-11 PROCEDURE — 99232 SBSQ HOSP IP/OBS MODERATE 35: CPT

## 2024-10-11 RX ORDER — PANCRELIPASE 16800; 98400; 56800 [USP'U]/1; [USP'U]/1; [USP'U]/1
1 CAPSULE, DELAYED RELEASE ORAL
Refills: 0 | Status: DISCONTINUED | OUTPATIENT
Start: 2024-10-11 | End: 2024-10-25

## 2024-10-11 RX ADMIN — PANCRELIPASE 1 CAPSULE(S): 16800; 98400; 56800 CAPSULE, DELAYED RELEASE ORAL at 12:29

## 2024-10-11 RX ADMIN — PREGABALIN 25 MILLIGRAM(S): 150 CAPSULE ORAL at 20:23

## 2024-10-11 RX ADMIN — PREGABALIN 25 MILLIGRAM(S): 150 CAPSULE ORAL at 12:20

## 2024-10-11 RX ADMIN — DULOXETINE HYDROCHLORIDE 20 MILLIGRAM(S): 30 CAPSULE, DELAYED RELEASE ORAL at 08:33

## 2024-10-11 RX ADMIN — Medication 1000 UNIT(S): at 08:33

## 2024-10-11 RX ADMIN — PREGABALIN 25 MILLIGRAM(S): 150 CAPSULE ORAL at 08:33

## 2024-10-11 RX ADMIN — PANCRELIPASE 1 CAPSULE(S): 16800; 98400; 56800 CAPSULE, DELAYED RELEASE ORAL at 18:19

## 2024-10-11 RX ADMIN — Medication 2 TABLET(S): at 20:23

## 2024-10-11 RX ADMIN — PANCRELIPASE 6 CAPSULE(S): 16800; 98400; 56800 CAPSULE, DELAYED RELEASE ORAL at 08:33

## 2024-10-11 NOTE — BH INPATIENT PSYCHIATRY PROGRESS NOTE - NSBHCHARTREVIEWVS_PSY_A_CORE FT
Vital Signs Last 24 Hrs  T(C): 36.6 (10-11-24 @ 07:51), Max: 36.6 (10-11-24 @ 07:51)  T(F): 97.8 (10-11-24 @ 07:51), Max: 97.8 (10-11-24 @ 07:51)  HR: --  BP: --  BP(mean): --  RR: --  SpO2: --    Orthostatic VS  10-11-24 @ 07:51  Lying BP: --/-- HR: --  Sitting BP: 120/77 HR: 70  Standing BP: 111/73 HR: 74  Site: --  Mode: --

## 2024-10-11 NOTE — BH INPATIENT PSYCHIATRY PROGRESS NOTE - NSBHFUPINTERVALHXFT_PSY_A_CORE
Ms. Diaz was seen today for follow up of depression. Remains on CO for hospital bed. Received Oxycodone prn last night around 11:30pm for pain. As per nighttime staff pt verbalized not wanting to live anymore due to chronic pain.  During encounter this morning pt presents constricted affect and is once again focused on being discharged today. Reports low mood, feeling hopeless and unmotivated. Attributes these feelings to being in the unit "I don't have the care I need here, you cannot help me" "At home my  gets me anything I need immediately". Minimizing depressive feelings and suicide attempt at home: "that won't happen again because my  will take the pills away". Discussed difference of suicidal ideation being deterred by lack of means vs by an actual improvement in her mood leading to better quality of life and desire to keep living. Pt did not respond to this and remains fixated on being discharged. Pt adamantly refusing to increase her dose of Cymbalta. States she is worried about potential side effects: "I need to be on the lowest doses of everything, Im too sensitive to side effects".  Spoke with  on the phone and provided treatment updates.

## 2024-10-11 NOTE — BH INPATIENT PSYCHIATRY PROGRESS NOTE - NSBHMETABOLIC_PSY_ALL_CORE_FT
BMI: BMI (kg/m2): 16.1 (09-29-24 @ 08:14)  HbA1c:   Glucose:   BP: 129/78 (10-10-24 @ 07:52) (110/72 - 129/78)Vital Signs Last 24 Hrs  T(C): 36.6 (10-11-24 @ 07:51), Max: 36.6 (10-11-24 @ 07:51)  T(F): 97.8 (10-11-24 @ 07:51), Max: 97.8 (10-11-24 @ 07:51)  HR: --  BP: --  BP(mean): --  RR: --  SpO2: --    Orthostatic VS  10-11-24 @ 07:51  Lying BP: --/-- HR: --  Sitting BP: 120/77 HR: 70  Standing BP: 111/73 HR: 74  Site: --  Mode: --    Lipid Panel:

## 2024-10-11 NOTE — BH INPATIENT PSYCHIATRY PROGRESS NOTE - NSBHASSESSSUMMFT_PSY_ALL_CORE
Patient is a 69 yo female with PPHx of depression and anxiety with no prior inpatient psychiatric hospitalizations, currently in outpatient treatment with psychiatrist Dr. Puente, and PMH significant for chronic back pain,  irritable bowel syndrome and osteoporosis with hx of compression fractures who presented s/p suicide attempt via overdose on her medication night prior to ED presentation on 9/29.     On arrival to  patient's presentation is consistent with MDD in the context of functional decline and worsening chronic pain over the past year. Pt reports low mood, hopelessness, anhedonia and passive SI. Denies active suicidal ideation, intent or plans now. At this time pt is very dysphoric and focused on being discharged. Agreed to start trial of Cymbalta, which she only took for 2-3 days before stopping it in the past.    10/11 Clinical Update: Continues to endorse low mood, feeling hopeless and unmotivated in the context of chronic pain. Declines to meet with hospitalist today to go over pain management and refuses to increase her dose of Cymbalta, afraid of side effects. Reports pain management today is "adequate". Preoccupied with discharge and attributing her depression to being in the hospital. Minimizing symptoms at home and suicide attempt. Would titrate Cymbalta if pt is amenable.    -Admit to [facility] 2S  -CO for hospital bed  -Legal status 2PC  -Continue Cymbalta 25mg daily for depression  -Continue pain management as per Pain Medicine recs --> Lyrica 25mg PO TID and Oxycodone 2.5mg PO every 4 hrs PRN  -PT consult  -Nutrition consult  -Pt would benefit from meeting with the  when available

## 2024-10-12 PROCEDURE — 99232 SBSQ HOSP IP/OBS MODERATE 35: CPT

## 2024-10-12 RX ORDER — LORAZEPAM 2 MG
1 TABLET ORAL EVERY 6 HOURS
Refills: 0 | Status: DISCONTINUED | OUTPATIENT
Start: 2024-10-12 | End: 2024-10-14

## 2024-10-12 RX ADMIN — Medication 1 MILLIGRAM(S): at 11:32

## 2024-10-12 RX ADMIN — Medication 2 TABLET(S): at 20:06

## 2024-10-12 RX ADMIN — PREGABALIN 25 MILLIGRAM(S): 150 CAPSULE ORAL at 12:28

## 2024-10-12 RX ADMIN — PREGABALIN 25 MILLIGRAM(S): 150 CAPSULE ORAL at 09:03

## 2024-10-12 RX ADMIN — OXYCODONE HYDROCHLORIDE 2.5 MILLIGRAM(S): 30 TABLET ORAL at 12:43

## 2024-10-12 RX ADMIN — Medication 3 MILLIGRAM(S): at 23:16

## 2024-10-12 RX ADMIN — PREGABALIN 25 MILLIGRAM(S): 150 CAPSULE ORAL at 20:06

## 2024-10-12 RX ADMIN — PANCRELIPASE 2 CAPSULE(S): 16800; 98400; 56800 CAPSULE, DELAYED RELEASE ORAL at 18:18

## 2024-10-12 RX ADMIN — Medication 1000 UNIT(S): at 09:04

## 2024-10-12 RX ADMIN — Medication 1 MILLIGRAM(S): at 23:16

## 2024-10-12 RX ADMIN — Medication 5 MILLIGRAM(S): at 11:33

## 2024-10-12 RX ADMIN — PANCRELIPASE 1 CAPSULE(S): 16800; 98400; 56800 CAPSULE, DELAYED RELEASE ORAL at 10:01

## 2024-10-12 RX ADMIN — PANCRELIPASE 1 CAPSULE(S): 16800; 98400; 56800 CAPSULE, DELAYED RELEASE ORAL at 12:30

## 2024-10-12 RX ADMIN — PANCRELIPASE 1 CAPSULE(S): 16800; 98400; 56800 CAPSULE, DELAYED RELEASE ORAL at 17:08

## 2024-10-12 RX ADMIN — OXYCODONE HYDROCHLORIDE 2.5 MILLIGRAM(S): 30 TABLET ORAL at 11:33

## 2024-10-12 RX ADMIN — Medication 30 MILLILITER(S): at 03:59

## 2024-10-12 NOTE — BH INPATIENT PSYCHIATRY PROGRESS NOTE - NSBHASSESSSUMMFT_PSY_ALL_CORE
Patient is a 69 yo female with PPHx of depression and anxiety with no prior inpatient psychiatric hospitalizations, currently in outpatient treatment with psychiatrist Dr. Puente, and PMH significant for chronic back pain,  irritable bowel syndrome and osteoporosis with hx of compression fractures who presented s/p suicide attempt via overdose on her medication night prior to ED presentation on 9/29.     On arrival to  patient's presentation is consistent with MDD in the context of functional decline and worsening chronic pain over the past year. Pt reports low mood, hopelessness, anhedonia and passive SI. Denies active suicidal ideation, intent or plans now. At this time pt is very dysphoric and focused on being discharged. Agreed to start trial of Cymbalta, which she only took for 2-3 days before stopping it in the past.    10/11 Clinical Update: Continues to endorse low mood, feeling hopeless and unmotivated in the context of chronic pain. Declines to meet with hospitalist today to go over pain management and refuses to increase her dose of Cymbalta, afraid of side effects. Reports pain management today is "adequate". Preoccupied with discharge and attributing her depression to being in the hospital. Minimizing symptoms at home and suicide attempt. Would titrate Cymbalta if pt is amenable.  10/12 Dysphoric irritable, no SI, pain focus. Patient encouraged to give chance for Cymbalta to work to help pain and depressed mood    -Admit to [facility] 2S  -CO for hospital bed  -Legal status 2PC  -Continue Cymbalta 25mg daily for depression  -Continue pain management as per Pain Medicine recs --> Lyrica 25mg PO TID and Oxycodone 2.5mg PO every 4 hrs PRN  -PT consult  -Nutrition consult  -Pt would benefit from meeting with the  when available

## 2024-10-12 NOTE — BH INPATIENT PSYCHIATRY PROGRESS NOTE - NSBHFUPINTERVALHXFT_PSY_A_CORE
Ms. Diaz was seen today for follow up of depression. Remains on CO for hospital bed. Received Oxycodone prn last night around 11:30pm for pain. As per nighttime staff pt verbalized not wanting to live anymore due to chronic pain.  During encounter this morning pt presents constricted affect and is once again focused on being discharged today. Reports low mood, feeling hopeless and unmotivated. Attributes these feelings to being in the unit "I don't have the care I need here, you cannot help me" "At home my  gets me anything I need immediately". Minimizing depressive feelings and suicide attempt at home: "that won't happen again because my  will take the pills away". Discussed difference of suicidal ideation being deterred by lack of means vs by an actual improvement in her mood leading to better quality of life and desire to keep living. Pt did not respond to this and remains fixated on being discharged. Pt adamantly refusing to increase her dose of Cymbalta. States she is worried about potential side effects: "I need to be on the lowest doses of everything, Im too sensitive to side effects".  Spoke with  on the phone and provided treatment updates.  10/12 Patient seen fro pain/depression. No overnight events. Eating sleeping fiar. Somatically focused. refused Cymbalta

## 2024-10-12 NOTE — BH INPATIENT PSYCHIATRY PROGRESS NOTE - NSBHCHARTREVIEWVS_PSY_A_CORE FT
Vital Signs Last 24 Hrs  T(C): 36.4 (10-12-24 @ 07:52), Max: 36.4 (10-12-24 @ 07:52)  T(F): 97.5 (10-12-24 @ 07:52), Max: 97.5 (10-12-24 @ 07:52)  HR: --  BP: --  BP(mean): --  RR: --  SpO2: --    Orthostatic VS  10-12-24 @ 07:52  Lying BP: --/-- HR: --  Sitting BP: 108/68 HR: 76  Standing BP: --/-- HR: --  Site: --  Mode: --  Orthostatic VS  10-11-24 @ 07:51  Lying BP: --/-- HR: --  Sitting BP: 120/77 HR: 70  Standing BP: 111/73 HR: 74  Site: --  Mode: --

## 2024-10-12 NOTE — BH INPATIENT PSYCHIATRY PROGRESS NOTE - NSBHMETABOLIC_PSY_ALL_CORE_FT
BMI: BMI (kg/m2): 16.1 (09-29-24 @ 08:14)  HbA1c:   Glucose:   BP: 129/78 (10-10-24 @ 07:52) (129/78 - 129/78)Vital Signs Last 24 Hrs  T(C): 36.4 (10-12-24 @ 07:52), Max: 36.4 (10-12-24 @ 07:52)  T(F): 97.5 (10-12-24 @ 07:52), Max: 97.5 (10-12-24 @ 07:52)  HR: --  BP: --  BP(mean): --  RR: --  SpO2: --    Orthostatic VS  10-12-24 @ 07:52  Lying BP: --/-- HR: --  Sitting BP: 108/68 HR: 76  Standing BP: --/-- HR: --  Site: --  Mode: --  Orthostatic VS  10-11-24 @ 07:51  Lying BP: --/-- HR: --  Sitting BP: 120/77 HR: 70  Standing BP: 111/73 HR: 74  Site: --  Mode: --    Lipid Panel:

## 2024-10-13 PROCEDURE — 99231 SBSQ HOSP IP/OBS SF/LOW 25: CPT

## 2024-10-13 RX ORDER — POLYETHYLENE GLYCOL 3350 17 G/17G
17 POWDER, FOR SOLUTION ORAL DAILY
Refills: 0 | Status: DISCONTINUED | OUTPATIENT
Start: 2024-10-13 | End: 2024-10-25

## 2024-10-13 RX ADMIN — PREGABALIN 25 MILLIGRAM(S): 150 CAPSULE ORAL at 20:08

## 2024-10-13 RX ADMIN — Medication 2 TABLET(S): at 20:08

## 2024-10-13 RX ADMIN — PREGABALIN 25 MILLIGRAM(S): 150 CAPSULE ORAL at 12:11

## 2024-10-13 RX ADMIN — Medication 3 MILLIGRAM(S): at 23:16

## 2024-10-13 RX ADMIN — OXYCODONE HYDROCHLORIDE 2.5 MILLIGRAM(S): 30 TABLET ORAL at 16:57

## 2024-10-13 RX ADMIN — Medication 30 MILLILITER(S): at 13:40

## 2024-10-13 RX ADMIN — PREGABALIN 25 MILLIGRAM(S): 150 CAPSULE ORAL at 08:40

## 2024-10-13 RX ADMIN — Medication 1000 UNIT(S): at 08:40

## 2024-10-13 RX ADMIN — OXYCODONE HYDROCHLORIDE 2.5 MILLIGRAM(S): 30 TABLET ORAL at 23:15

## 2024-10-13 RX ADMIN — PANCRELIPASE 1 CAPSULE(S): 16800; 98400; 56800 CAPSULE, DELAYED RELEASE ORAL at 09:40

## 2024-10-13 RX ADMIN — PANCRELIPASE 1 CAPSULE(S): 16800; 98400; 56800 CAPSULE, DELAYED RELEASE ORAL at 16:42

## 2024-10-13 RX ADMIN — OXYCODONE HYDROCHLORIDE 2.5 MILLIGRAM(S): 30 TABLET ORAL at 23:32

## 2024-10-13 RX ADMIN — PANCRELIPASE 1 CAPSULE(S): 16800; 98400; 56800 CAPSULE, DELAYED RELEASE ORAL at 10:48

## 2024-10-13 NOTE — BH INPATIENT PSYCHIATRY PROGRESS NOTE - NSBHCHARTREVIEWVS_PSY_A_CORE FT
Vital Signs Last 24 Hrs  T(C): 36.6 (10-13-24 @ 07:33), Max: 36.6 (10-13-24 @ 07:33)  T(F): 97.9 (10-13-24 @ 07:33), Max: 97.9 (10-13-24 @ 07:33)  HR: --  BP: --  BP(mean): --  RR: --  SpO2: --    Orthostatic VS  10-13-24 @ 07:33  Lying BP: --/-- HR: --  Sitting BP: 108/71 HR: 71  Standing BP: --/-- HR: --  Site: --  Mode: --  Orthostatic VS  10-12-24 @ 07:52  Lying BP: --/-- HR: --  Sitting BP: 108/68 HR: 76  Standing BP: --/-- HR: --  Site: --  Mode: --

## 2024-10-13 NOTE — BH INPATIENT PSYCHIATRY PROGRESS NOTE - NSBHFUPINTERVALHXFT_PSY_A_CORE
Ms. Diaz was seen today for follow up of depression. Remains on CO for hospital bed. Received Oxycodone prn last night around 11:30pm for pain. As per nighttime staff pt verbalized not wanting to live anymore due to chronic pain.  During encounter this morning pt presents constricted affect and is once again focused on being discharged today. Reports low mood, feeling hopeless and unmotivated. Attributes these feelings to being in the unit "I don't have the care I need here, you cannot help me" "At home my  gets me anything I need immediately". Minimizing depressive feelings and suicide attempt at home: "that won't happen again because my  will take the pills away". Discussed difference of suicidal ideation being deterred by lack of means vs by an actual improvement in her mood leading to better quality of life and desire to keep living. Pt did not respond to this and remains fixated on being discharged. Pt adamantly refusing to increase her dose of Cymbalta. States she is worried about potential side effects: "I need to be on the lowest doses of everything, Im too sensitive to side effects".  Spoke with  on the phone and provided treatment updates.  10/12 Patient seen fro pain/depression. No overnight events. Eating sleeping fiar. Somatically focused. refused Cymbalta  10/13 Patient being f/u for depression/pain. No acute overnight events. Refused Cymbalta. Somatically preoccupied, stating she has been constipated, only able to go today after receiving dulcolax yesterday. Endorses wanting her GI provider to be contacted regarding her Creon administration. Denies suicidal ideation, HI.

## 2024-10-13 NOTE — BH INPATIENT PSYCHIATRY PROGRESS NOTE - NSBHMETABOLIC_PSY_ALL_CORE_FT
BMI: BMI (kg/m2): 16.1 (09-29-24 @ 08:14)  HbA1c:   Glucose:   BP: --Vital Signs Last 24 Hrs  T(C): 36.6 (10-13-24 @ 07:33), Max: 36.6 (10-13-24 @ 07:33)  T(F): 97.9 (10-13-24 @ 07:33), Max: 97.9 (10-13-24 @ 07:33)  HR: --  BP: --  BP(mean): --  RR: --  SpO2: --    Orthostatic VS  10-13-24 @ 07:33  Lying BP: --/-- HR: --  Sitting BP: 108/71 HR: 71  Standing BP: --/-- HR: --  Site: --  Mode: --  Orthostatic VS  10-12-24 @ 07:52  Lying BP: --/-- HR: --  Sitting BP: 108/68 HR: 76  Standing BP: --/-- HR: --  Site: --  Mode: --    Lipid Panel:

## 2024-10-13 NOTE — BH INPATIENT PSYCHIATRY PROGRESS NOTE - NSBHASSESSSUMMFT_PSY_ALL_CORE
Patient is a 67 yo female with PPHx of depression and anxiety with no prior inpatient psychiatric hospitalizations, currently in outpatient treatment with psychiatrist Dr. Puente, and PMH significant for chronic back pain,  irritable bowel syndrome and osteoporosis with hx of compression fractures who presented s/p suicide attempt via overdose on her medication night prior to ED presentation on 9/29.     On arrival to  patient's presentation is consistent with MDD in the context of functional decline and worsening chronic pain over the past year. Pt reports low mood, hopelessness, anhedonia and passive SI. Denies active suicidal ideation, intent or plans now. At this time pt is very dysphoric and focused on being discharged. Agreed to start trial of Cymbalta, which she only took for 2-3 days before stopping it in the past.    10/11 Clinical Update: Continues to endorse low mood, feeling hopeless and unmotivated in the context of chronic pain. Declines to meet with hospitalist today to go over pain management and refuses to increase her dose of Cymbalta, afraid of side effects. Reports pain management today is "adequate". Preoccupied with discharge and attributing her depression to being in the hospital. Minimizing symptoms at home and suicide attempt. Would titrate Cymbalta if pt is amenable.  10/12 Dysphoric irritable, no SI, pain focus. Patient encouraged to give chance for Cymbalta to work to help pain and depressed mood  10/13 Irritable, somatically preoccupied with focus on receiving her Creon, focused on pain and constipation. Encouraged to adhere to Cymbalta and agreed to add daily miralax to regimen for constipation. If develop loose BM, will reduce to PRN.    -Admit to [facility] 2S  -CO for hospital bed  -Legal status 2PC  -Continue Cymbalta 20mg daily for depression  -Continue pain management as per Pain Medicine recs --> Lyrica 25mg PO TID and Oxycodone 2.5mg PO every 4 hrs PRN  -PT consult  -Nutrition consult  -Pt would benefit from meeting with the  when available

## 2024-10-14 PROCEDURE — 99232 SBSQ HOSP IP/OBS MODERATE 35: CPT

## 2024-10-14 RX ORDER — OXYCODONE HYDROCHLORIDE 30 MG/1
2.5 TABLET ORAL EVERY 6 HOURS
Refills: 0 | Status: DISCONTINUED | OUTPATIENT
Start: 2024-10-14 | End: 2024-10-21

## 2024-10-14 RX ORDER — CHOLESTEROL/SOYBEAN OIL/C/E 60-200-80
1 POWDER (GRAM) ORAL DAILY
Refills: 0 | Status: DISCONTINUED | OUTPATIENT
Start: 2024-10-14 | End: 2024-10-25

## 2024-10-14 RX ORDER — DIAZEPAM 10 MG/1
5 FILM BUCCAL
Refills: 0 | Status: DISCONTINUED | OUTPATIENT
Start: 2024-10-14 | End: 2024-10-21

## 2024-10-14 RX ORDER — CALCIUM CARBONATE 400(1000)
1 TABLET,CHEWABLE ORAL AT BEDTIME
Refills: 0 | Status: DISCONTINUED | OUTPATIENT
Start: 2024-10-14 | End: 2024-10-15

## 2024-10-14 RX ORDER — PREGABALIN 150 MG/1
25 CAPSULE ORAL THREE TIMES A DAY
Refills: 0 | Status: DISCONTINUED | OUTPATIENT
Start: 2024-10-14 | End: 2024-10-21

## 2024-10-14 RX ADMIN — Medication 325 MILLIGRAM(S): at 08:54

## 2024-10-14 RX ADMIN — PANCRELIPASE 1 CAPSULE(S): 16800; 98400; 56800 CAPSULE, DELAYED RELEASE ORAL at 16:54

## 2024-10-14 RX ADMIN — OXYCODONE HYDROCHLORIDE 2.5 MILLIGRAM(S): 30 TABLET ORAL at 15:14

## 2024-10-14 RX ADMIN — OXYCODONE HYDROCHLORIDE 2.5 MILLIGRAM(S): 30 TABLET ORAL at 05:36

## 2024-10-14 RX ADMIN — PREGABALIN 25 MILLIGRAM(S): 150 CAPSULE ORAL at 20:54

## 2024-10-14 RX ADMIN — POLYETHYLENE GLYCOL 3350 17 GRAM(S): 17 POWDER, FOR SOLUTION ORAL at 08:54

## 2024-10-14 RX ADMIN — PANCRELIPASE 2 CAPSULE(S): 16800; 98400; 56800 CAPSULE, DELAYED RELEASE ORAL at 18:51

## 2024-10-14 RX ADMIN — OXYCODONE HYDROCHLORIDE 2.5 MILLIGRAM(S): 30 TABLET ORAL at 14:24

## 2024-10-14 RX ADMIN — PANCRELIPASE 1 CAPSULE(S): 16800; 98400; 56800 CAPSULE, DELAYED RELEASE ORAL at 11:45

## 2024-10-14 RX ADMIN — Medication 1000 UNIT(S): at 08:53

## 2024-10-14 RX ADMIN — OXYCODONE HYDROCHLORIDE 2.5 MILLIGRAM(S): 30 TABLET ORAL at 05:39

## 2024-10-14 RX ADMIN — Medication 650 MILLIGRAM(S): at 08:54

## 2024-10-14 RX ADMIN — Medication 3 MILLIGRAM(S): at 23:01

## 2024-10-14 RX ADMIN — PREGABALIN 25 MILLIGRAM(S): 150 CAPSULE ORAL at 08:54

## 2024-10-14 RX ADMIN — Medication 2 TABLET(S): at 20:54

## 2024-10-14 RX ADMIN — Medication 1 TABLET(S): at 20:54

## 2024-10-14 RX ADMIN — DULOXETINE HYDROCHLORIDE 20 MILLIGRAM(S): 30 CAPSULE, DELAYED RELEASE ORAL at 08:54

## 2024-10-14 NOTE — BH INPATIENT PSYCHIATRY PROGRESS NOTE - NSBHFUPINTERVALHXFT_PSY_A_CORE
Ms. Diaz was seen today for follow up of depression. Remains on CO for hospital bed. Received Oxycodone prn last night around 11:30pm for pain. As per nighttime staff pt verbalized not wanting to live anymore due to chronic pain.  During encounter this morning pt presents constricted affect and is once again focused on being discharged today. Reports low mood, feeling hopeless and unmotivated. Attributes these feelings to being in the unit "I don't have the care I need here, you cannot help me" "At home my  gets me anything I need immediately". Minimizing depressive feelings and suicide attempt at home: "that won't happen again because my  will take the pills away". Discussed difference of suicidal ideation being deterred by lack of means vs by an actual improvement in her mood leading to better quality of life and desire to keep living. Pt did not respond to this and remains fixated on being discharged. Pt adamantly refusing to increase her dose of Cymbalta. States she is worried about potential side effects: "I need to be on the lowest doses of everything, Im too sensitive to side effects".  Spoke with  on the phone and provided treatment updates.  10/12 Patient seen fro pain/depression. No overnight events. Eating sleeping fiar. Somatically focused. refused Cymbalta  10/13 Patient being f/u for depression/pain. No acute overnight events. Refused Cymbalta. Somatically preoccupied, stating she has been constipated, only able to go today after receiving dulcolax yesterday. Endorses wanting her GI provider to be contacted regarding her Creon administration. Denies suicidal ideation, HI.   Ms. Diaz was seen today for follow up of depression. Remains on CO for hospital bed. Received Oxycodone prn this morning around 5am for pain and another dose around noon. Refused Cymbalta Saturday and Sunday. Took it this morning.  On encounter pt is dysphoric. Endorses severe back pain despite PRN oxycodone. Pt expresses concern her back condition is worsening and wonders if she has acquired any new compression fractures. Pt refused all imaging in [facility], stating it would be too painful for her to lie flat. Pt agreeing to go for full spine MRI if she receives anesthesia for it.   Pt continues to endorse low mood, hopelessness in the face of chronic pain. Provided psychoeducation on depression management and encouraged pt to take her Cymbalta daily to allow for a proper trial. Pt reports she refused it over the weekend because she was feeling constipated, however constipation has been a chronic issue preceding Cymbalta trial. Pt has a pattern of stopping antidepressants within days of starting them and has not had a fair trial of any thus far. Discussed a full trial would mean she takes the medication for at least 6 weeks to which pt responds "I don't think I'll be alive in 6 weeks, this pain will kill me".

## 2024-10-14 NOTE — BH INPATIENT PSYCHIATRY PROGRESS NOTE - NSBHMETABOLIC_PSY_ALL_CORE_FT
BMI: BMI (kg/m2): 16.1 (09-29-24 @ 08:14)  HbA1c:   Glucose:   BP: 112/63 (10-14-24 @ 08:13) (112/63 - 112/63)Vital Signs Last 24 Hrs  T(C): 36.4 (10-14-24 @ 08:13), Max: 36.4 (10-14-24 @ 08:13)  T(F): 97.6 (10-14-24 @ 08:13), Max: 97.6 (10-14-24 @ 08:13)  HR: 71 (10-14-24 @ 08:13) (71 - 71)  BP: 112/63 (10-14-24 @ 08:13) (112/63 - 112/63)  BP(mean): --  RR: --  SpO2: --    Orthostatic VS  10-13-24 @ 07:33  Lying BP: --/-- HR: --  Sitting BP: 108/71 HR: 71  Standing BP: --/-- HR: --  Site: --  Mode: --    Lipid Panel:  BMI: BMI (kg/m2): 16.1 (09-29-24 @ 08:14)  HbA1c:   Glucose:   BP: 109/67 (10-14-24 @ 14:45) (109/67 - 112/63)Vital Signs Last 24 Hrs  T(C): 36.5 (10-14-24 @ 14:45), Max: 36.5 (10-14-24 @ 14:45)  T(F): 97.7 (10-14-24 @ 14:45), Max: 97.7 (10-14-24 @ 14:45)  HR: 74 (10-14-24 @ 14:45) (71 - 74)  BP: 109/67 (10-14-24 @ 14:45) (109/67 - 112/63)  BP(mean): --  RR: 18 (10-14-24 @ 14:45) (18 - 18)  SpO2: 98% (10-14-24 @ 14:45) (98% - 98%)    Orthostatic VS  10-13-24 @ 07:33  Lying BP: --/-- HR: --  Sitting BP: 108/71 HR: 71  Standing BP: --/-- HR: --  Site: --  Mode: --    Lipid Panel:

## 2024-10-14 NOTE — PROGRESS NOTE ADULT - SUBJECTIVE AND OBJECTIVE BOX
Mal Bhatti MD  Academic Hospitalist  Pager [phone]  Email: [email or web address]  Available on [employer] Teams        PROGRESS NOTE:     Patient is a 68y old  Female who presents with a chief complaint of Major depressive disorder with single episode     (09 Oct 2024 14:25)      SUBJECTIVE / OVERNIGHT EVENTS:  Patient seen and examined this afternoon. Patient dissatisfied with her pain treatment. She thinks she has another compression fracture. She has for the most part been refusing care and treatments offered by the psychiatrist and has also refused further scans when she was hospitalized.       MEDICATIONS  (STANDING):  calcium carbonate   1250 mG (OsCal) 1 Tablet(s) Oral at bedtime  cholecalciferol 1000 Unit(s) Oral daily  DULoxetine 20 milliGRAM(s) Oral daily  ferrous    sulfate 325 milliGRAM(s) Oral daily  LORazepam   Injectable 1 milliGRAM(s) IntraMuscular once  multivitamin/minerals 1 Tablet(s) Oral daily  pancrelipase  (CREON  6,000 Lipase Units) 1 Capsule(s) Oral three times a day with meals  polyethylene glycol 3350 17 Gram(s) Oral daily  pregabalin 25 milliGRAM(s) Oral three times a day  senna 2 Tablet(s) Oral at bedtime    MEDICATIONS  (PRN):  acetaminophen     Tablet .. 650 milliGRAM(s) Oral every 6 hours PRN Mild Pain (1 - 3)  aluminum hydroxide/magnesium hydroxide/simethicone Suspension 30 milliLiter(s) Oral every 4 hours PRN Dyspepsia  bisacodyl 5 milliGRAM(s) Oral every 12 hours PRN Constipation  bisacodyl Suppository 10 milliGRAM(s) Rectal daily PRN Constipation  haloperidol     Tablet 1 milliGRAM(s) Oral every 6 hours PRN Agitation  haloperidol    Injectable 1 milliGRAM(s) IntraMuscular once PRN agitation  LORazepam     Tablet 1 milliGRAM(s) Oral every 6 hours PRN anxiety  melatonin. 3 milliGRAM(s) Oral at bedtime PRN Insomnia  oxyCODONE    IR 2.5 milliGRAM(s) Oral every 6 hours PRN Severe Pain  pancrelipase  (CREON  6,000 Lipase Units) 2 Capsule(s) Oral with dinner PRN Bedtime snack  saline laxative (FLEET) Rectal Enema 1 Enema Rectal once PRN Constipation  simethicone 80 milliGRAM(s) Chew every 8 hours PRN Gas      CAPILLARY BLOOD GLUCOSE        I&O's Summary      PHYSICAL EXAM:  Vital Signs Last 24 Hrs  T(C): 36.4 (14 Oct 2024 08:13), Max: 36.4 (14 Oct 2024 08:13)  T(F): 97.6 (14 Oct 2024 08:13), Max: 97.6 (14 Oct 2024 08:13)  HR: 71 (14 Oct 2024 08:13) (71 - 71)  BP: 112/63 (14 Oct 2024 08:13) (112/63 - 112/63)  BP(mean): --  RR: --  SpO2: --    CONSTITUTIONAL: NAD, weak in bed, though earlier walking with PT. Complaining.   RESPIRATORY: Normal respiratory effort; no respiratory distress, CTAB  CARDIOVASCULAR: No visible JVD, No lower extremity edema; S1S2, no m,r,g  ABDOMEN: Not guarding, does not appear distended, BS+  MUSCLOSKELETAL: no clubbing or cyanosis of digits; no joint swelling   PSYCH: AOx3    LABS:                      RADIOLOGY & ADDITIONAL TESTS:  Results Reviewed:   Imaging Personally Reviewed:  Electrocardiogram Personally Reviewed:    COORDINATION OF CARE:  Care Discussed with Consultants/Other Providers [Y/N]: Discussed extensively with Dr. Salinas from psychiatry  Prior or Outpatient Records Reviewed [Y/N]:

## 2024-10-14 NOTE — BH INPATIENT PSYCHIATRY PROGRESS NOTE - NSBHCHARTREVIEWVS_PSY_A_CORE FT
Vital Signs Last 24 Hrs  T(C): 36.4 (10-14-24 @ 08:13), Max: 36.4 (10-14-24 @ 08:13)  T(F): 97.6 (10-14-24 @ 08:13), Max: 97.6 (10-14-24 @ 08:13)  HR: 71 (10-14-24 @ 08:13) (71 - 71)  BP: 112/63 (10-14-24 @ 08:13) (112/63 - 112/63)  BP(mean): --  RR: --  SpO2: --    Orthostatic VS  10-13-24 @ 07:33  Lying BP: --/-- HR: --  Sitting BP: 108/71 HR: 71  Standing BP: --/-- HR: --  Site: --  Mode: --   Vital Signs Last 24 Hrs  T(C): 36.5 (10-14-24 @ 14:45), Max: 36.5 (10-14-24 @ 14:45)  T(F): 97.7 (10-14-24 @ 14:45), Max: 97.7 (10-14-24 @ 14:45)  HR: 74 (10-14-24 @ 14:45) (71 - 74)  BP: 109/67 (10-14-24 @ 14:45) (109/67 - 112/63)  BP(mean): --  RR: 18 (10-14-24 @ 14:45) (18 - 18)  SpO2: 98% (10-14-24 @ 14:45) (98% - 98%)    Orthostatic VS  10-13-24 @ 07:33  Lying BP: --/-- HR: --  Sitting BP: 108/71 HR: 71  Standing BP: --/-- HR: --  Site: --  Mode: --

## 2024-10-14 NOTE — PROGRESS NOTE ADULT - ASSESSMENT
67 y/o F with pmhx of chronic back pain, anxiety, depression, OA, IBS hx of suicide attempt was admitted to [facility] w/new onset suicidal ideations and chronic back pain. No red flags noted, pain has been chronic in nature patient cannot lay flat for CT scan or MRI studies, refused c/t/l spine xrays as well. Here she continues to complain of similar back pain, also states that she is constipated and needs a raised toilet, refuses to use anything else to move her bowels.        #Chronic back pain.    - patient with chronic back pain, poor follow up outpatient  - pain management consult appreciated, started on po dilaudid 0.5mg q8h prn for moderate and 1mg for severe pain, now transitioned to oxycodone 2.5mg q6h  - patient was unable to lay flat for CT scan or MRI studies, refused c/t/l spine xrays as well  - cont lidocaine patch  - cont lyrica 25mg QD for neuropathic pain  - PT eval- no skilled PT needs  - pt with h/o Osteroporosis, has tried meds in the past which did not improve her symptoms, endocrine consulted  started on vit d and ca, Per endo Pt would benefit from anabolic agent. Given severely low T score of hip, Evenity preferred over tymlos/forteo, though nonvertebral/vertebral benefit remains the same. Medication to be discussed and started as outpatient basis, along with DXA scan. Patient prefers to follow up with rheumatology clinic.  - vit d level low.  10/7 chronic pain f/u (4262)--> Lyrica started  Oxycodone sol 2.5 mg q6 prn mod to severe pain. Ocy ir 5mg d/c  Tylenol 500 mg q8 x 2 dayss, then prn.  Out patent f/u Dr BYRON Carl--> out patient pain mng MD.  10/14- Patient dissatisfied with pain regiment. Patient told that no escalation will be made without obtaining images.    #IBS (irritable bowel syndrome).    - follow up outpatient  - no signs of sepsis or concerns of colitis  - c/w Creon at a lower dose.      #Vitamin D deficiency.   vit d level low at 28, pth wnl  cont vit d.    #Anemia.   Hb stable, ferritin low normal, vit b12wnl, folate elevated  cont po iron   cont to monitor.    #Suicidal ideation/Anxiety/MDD/Bipolar  Continue workup, management and treatment as per primary/psychiatry team 69 y/o F with pmhx of chronic back pain, anxiety, depression, OA, IBS hx of suicide attempt was admitted to [facility] w/new onset suicidal ideations and chronic back pain. No red flags noted, pain has been chronic in nature patient cannot lay flat for CT scan or MRI studies, refused c/t/l spine xrays as well. Here she continues to complain of similar back pain, also states that she is constipated and needs a raised toilet, refuses to use anything else to move her bowels.        #Chronic back pain.    - patient with chronic back pain, poor follow up outpatient  - pain management consult appreciated, started on po dilaudid 0.5mg q8h prn for moderate and 1mg for severe pain, now transitioned to oxycodone 2.5mg q6h  - patient was unable to lay flat for CT scan or MRI studies, refused c/t/l spine xrays as well  - cont lidocaine patch  - cont lyrica 25mg QD for neuropathic pain  - PT eval- no skilled PT needs  - pt with h/o Osteroporosis, has tried meds in the past which did not improve her symptoms, endocrine consulted  started on vit d and ca, Per endo Pt would benefit from anabolic agent. Given severely low T score of hip, Evenity preferred over tymlos/forteo, though nonvertebral/vertebral benefit remains the same. Medication to be discussed and started as outpatient basis, along with DXA scan. Patient prefers to follow up with rheumatology clinic.  - vit d level low.  10/7 chronic pain f/u (4262)--> Lyrica started  Oxycodone sol 2.5 mg q6 prn mod to severe pain. Ocy ir 5mg d/c  Tylenol 500 mg q8 x 2 dayss, then prn.  Out patent f/u Dr BYRON Carl--> out patient pain mng MD.  10/14- Patient dissatisfied with pain regiment. Patient told that no escalation will be made without obtaining images. Agrees to MRI with sedation.     #IBS (irritable bowel syndrome).    - follow up outpatient  - no signs of sepsis or concerns of colitis  - c/w Creon at a lower dose.      #Vitamin D deficiency.   vit d level low at 28, pth wnl  cont vit d.    #Anemia.   Hb stable, ferritin low normal, vit b12wnl, folate elevated  cont po iron   cont to monitor.    #Suicidal ideation/Anxiety/MDD/Bipolar  Continue workup, management and treatment as per primary/psychiatry team

## 2024-10-14 NOTE — BH INPATIENT PSYCHIATRY PROGRESS NOTE - NSBHFUPINTERVALCCFT_PSY_A_CORE
Patient came to clinic for anticoagulation monitoring.  Last INR on 3/8 was 2.6.  Dose maintained.   Today's INR is 1.9 and is below goal range.    Current warfarin total weekly dose of 32 mg verified.  Informed the INR result is below therapeutic range and instructed to increase current dose per protocol. Discussed dose and return date of 4/3 for next INR. See Anticoagulation flowsheet.    Dr Lyles is in the office today supervising the treatment.    Call your physician immediately if you notice any of the following symptoms of a blood clot:   Sudden weakness in any limb  Numbness or tingling anywhere  Visual changes or loss of sight in either eye  Sudden onset of slurred speech or inability to speak  Dizziness or faintness  New pain, swelling, redness or heat in any extremity  New SOB or chest pain  Symptoms associated with blood clotting/low INR reviewed and verbalizes understanding.    Instructed to contact the clinic with any unusual bleeding or bruising, any changes in medications, diet, health status, lifestyle, or any other changes, questions or concerns. Verbalized understanding of all discussed.      " "I can't handle this pain"

## 2024-10-14 NOTE — BH INPATIENT PSYCHIATRY PROGRESS NOTE - NSBHASSESSSUMMFT_PSY_ALL_CORE
Patient is a 67 yo female with PPHx of depression and anxiety with no prior inpatient psychiatric hospitalizations, currently in outpatient treatment with psychiatrist Dr. Puente, and PMH significant for chronic back pain,  irritable bowel syndrome and osteoporosis with hx of compression fractures who presented s/p suicide attempt via overdose on her medication night prior to ED presentation on 9/29.     On arrival to  patient's presentation is consistent with MDD in the context of functional decline and worsening chronic pain over the past year. Pt reports low mood, hopelessness, anhedonia and passive SI. Denies active suicidal ideation, intent or plans now. At this time pt is very dysphoric and focused on being discharged. Agreed to start trial of Cymbalta, which she only took for 2-3 days before stopping it in the past.    10/11 Clinical Update: Continues to endorse low mood, feeling hopeless and unmotivated in the context of chronic pain. Declines to meet with hospitalist today to go over pain management and refuses to increase her dose of Cymbalta, afraid of side effects. Reports pain management today is "adequate". Preoccupied with discharge and attributing her depression to being in the hospital. Minimizing symptoms at home and suicide attempt. Would titrate Cymbalta if pt is amenable.  10/12 Dysphoric irritable, no SI, pain focus. Patient encouraged to give chance for Cymbalta to work to help pain and depressed mood  10/13 Irritable, somatically preoccupied with focus on receiving her Creon, focused on pain and constipation. Encouraged to adhere to Cymbalta and agreed to add daily miralax to regimen for constipation. If develop loose BM, will reduce to PRN.    -Admit to [facility] 2S  -CO for hospital bed  -Legal status 2PC  -Continue Cymbalta 20mg daily for depression  -Continue pain management as per Pain Medicine recs --> Lyrica 25mg PO TID and Oxycodone 2.5mg PO every 4 hrs PRN  -PT consult  -Nutrition consult  -Pt would benefit from meeting with the  when available Patient is a 69 yo female with PPHx of depression and anxiety with no prior inpatient psychiatric hospitalizations, currently in outpatient treatment with psychiatrist Dr. Puente, and PMH significant for chronic back pain,  irritable bowel syndrome and osteoporosis with hx of compression fractures who presented s/p suicide attempt via overdose on her medication night prior to ED presentation on 9/29.     On arrival to  patient's presentation is consistent with MDD in the context of functional decline and worsening chronic pain over the past year. Pt reports low mood, hopelessness, anhedonia and passive SI. Denies active suicidal ideation, intent or plans now. At this time pt is very dysphoric and focused on being discharged. Agreed to start trial of Cymbalta, which she only took for 2-3 days before stopping it in the past.    10/11 Clinical Update: Continues to endorse low mood, feeling hopeless and unmotivated in the context of chronic pain. Declines to meet with hospitalist today to go over pain management and refuses to increase her dose of Cymbalta, afraid of side effects. Reports pain management today is "adequate". Preoccupied with discharge and attributing her depression to being in the hospital. Minimizing symptoms at home and suicide attempt. Would titrate Cymbalta if pt is amenable.  10/12 Dysphoric irritable, no SI, pain focus. Patient encouraged to give chance for Cymbalta to work to help pain and depressed mood  10/13 Irritable, somatically preoccupied with focus on receiving her Creon, focused on pain and constipation. Encouraged to adhere to Cymbalta and agreed to add daily miralax to regimen for constipation. If develop loose BM, will reduce to PRN.  10/14: Dysphoric, irritable, partially compliant with Cymbalta. Continues to report constipation, will continue standing senna, miralax and prn dulcolax. Switched PRN ativan for PRN valium 5mg twice daily. Pt was on this dose of valium prn in J and reports better results for both anxiety and pain control. Added OsCal supplementation as per patient's request. Full spine MRI without contrast ordered and discussed with MRI supervisor and hospitalist. Will discuss with anesthesia. Pt will have to be NPO after midnight, discussed with nursing.     -Admit to [facility] 2S  -CO for hospital bed  -Legal status 2PC  -Continue Cymbalta 20mg daily for depression  -Continue pain management as per Pain Medicine recs --> Lyrica 25mg PO TID and Oxycodone 2.5mg PO every 4 hrs PRN  -Full spine MRI without contrast, with anesthesia as per patient's request for pain management pending.  -NPO AFTER MIDNIGHT for MRI  -PT consult  -Nutrition consult complete, recs appreciated  -Pt would benefit from meeting with the  when available

## 2024-10-14 NOTE — CHART NOTE - NSCHARTNOTEFT_GEN_A_CORE
Patient is a 67 y/o female with PPHx of depression and anxiety with no prior inpatient psychiatric hospitalizations, currently in outpatient treatment with psychiatrist Dr. Puente, and PMH significant for chronic back pain, irritable bowel syndrome and osteoporosis with hx of compression fractures who presented s/p suicide attempt via overdose on her medication night prior to ED presentation on 9/29. Patient was followed by pain management and psychiatry in [facility]. Admitted to [facility] 2S on 10/8/24. Last seen by this writer on 10/9/24. Nutrition re-consulted for assessment/food choices.    Met with patient today who was on the hospital bed with 1:1 CO present in the room. Patient reports her appetite remains poor with PO intake </= 50% on current diet, states her appetite was better before the weekend and now she does not have much appetite due to worsening back pain since this past weekend. Feels anxious from on-going back pain and GI s/sx as "there was not much improvement."     Noted patient unable to tolerate most dairy with the exception of butter and certain cheese such as swiss/cheddar; no soy milk/almond milk, no citrus including orange/tomatoes, no spicy foods, no caffeine, no high fat foods, no high fiber foods including oatmeal, no peas/beans. Ok w/ cream of rice, butter, fish, scrambled eggs, apple juice, applesauce.  Updates: no broccoli/squash, has been able to tolerate lactaid milk.     Personal food preferences: pesco-vegetarian -- pt does not want to liberalize her diet at this time.    Continues to prefers a pureed diet for easier GI tolerance/digestion; but  has been bringing home food to patient during visiting hrs such as egg white, potato chips, rice chex, cheerios in regular consistency which patient tolerated them well with no GI or chewing/swallowing issues. Patient requests to have rice chex/cheerio 2x added to her diet so she can pair them with Lactaid milk. Patient also requests for Lactase pills w/ each meal which she has been taking at home. Case d/w MD and RN on the unit.     Writer encouraged po & protein intake as tolerated, patient verbalized understanding but will require reinforcement. Will send double protein portions. Patient continues to decline all ONS offered in-house including Ensure Clear which has no fat and is gluten free, and also [employer] supplements which she had tried during her hospitalization at [place] few months ago (states it gave her massive diarrhea). c/w Creon, Oscal, ferrous sulfate, Multivitamin w/ minerals as per MD order.    Diet : Diet, Pureed:   Lactose Restricted (Milk Sugar Intoler.)  Gluten-Gliadin Restricted  Pesco Vegetarian (Accepts Fish)  No Caffeine (10-09-24 @ 15:27) [Active]    Patient reports [ ] nausea  [ ] vomiting [ ] diarrhea [X] constipation, last BM this AM, on senna and miralax daily for bowel regularity  [ ] chewing problems [ ] swallowing issues  [X] other: abd pain and bloating    PO intake:  [X] < 50%  [ ] 50-75%  [ ] %  [ ] other :    Height (cm): 147.3 (09-29-24)  Weight (kg): 35 (09-29-24)  -- no new wt to reassess at this time  UBW 65lb  BMI (kg/m2): 16.1 (09-29-24); 13.5 (based on UBW)    Skin: no pressure injuries     Edema: no edema    Pertinent Medications: MEDICATIONS  (STANDING):  calcium carbonate   1250 mG (OsCal) 1 Tablet(s) Oral at bedtime  cholecalciferol 1000 Unit(s) Oral daily  DULoxetine 20 milliGRAM(s) Oral daily  ferrous    sulfate 325 milliGRAM(s) Oral daily  LORazepam   Injectable 1 milliGRAM(s) IntraMuscular once  multivitamin/minerals 1 Tablet(s) Oral daily  pancrelipase  (CREON  6,000 Lipase Units) 1 Capsule(s) Oral three times a day with meals  polyethylene glycol 3350 17 Gram(s) Oral daily  pregabalin 25 milliGRAM(s) Oral three times a day  senna 2 Tablet(s) Oral at bedtime    MEDICATIONS  (PRN):  acetaminophen     Tablet .. 650 milliGRAM(s) Oral every 6 hours PRN Mild Pain (1 - 3)  aluminum hydroxide/magnesium hydroxide/simethicone Suspension 30 milliLiter(s) Oral every 4 hours PRN Dyspepsia  bisacodyl 5 milliGRAM(s) Oral every 12 hours PRN Constipation  bisacodyl Suppository 10 milliGRAM(s) Rectal daily PRN Constipation  haloperidol     Tablet 1 milliGRAM(s) Oral every 6 hours PRN Agitation  haloperidol    Injectable 1 milliGRAM(s) IntraMuscular once PRN agitation  LORazepam     Tablet 1 milliGRAM(s) Oral every 6 hours PRN anxiety  melatonin. 3 milliGRAM(s) Oral at bedtime PRN Insomnia  oxyCODONE    IR 2.5 milliGRAM(s) Oral every 6 hours PRN Severe Pain  pancrelipase  (CREON  6,000 Lipase Units) 2 Capsule(s) Oral with dinner PRN Bedtime snack  saline laxative (FLEET) Rectal Enema 1 Enema Rectal once PRN Constipation  simethicone 80 milliGRAM(s) Chew every 8 hours PRN Gas    Pertinent Labs:  no new chem labs to reassess at this time        Estimated Needs:   [X] no change since previous assessment  [ ] recalculated:       Previous Nutrition Diagnosis: Severe malnutrition    Nutrition Diagnosis is [X] ongoing  [ ] resolved [ ] not applicable        New Nutrition Diagnosis: [X] not applicable      Recommendations:  1. Continue current diet order, which remains appropriate at this time.   2. Patient may benefit from ONS (i.e. Ensure Clear) but patient declined at this time.  3. c/w Creon with meals, ferrous sulfate, OsCal and Multivitamin w/ minerals per MD order.  4. May consider lactase and probiotics for better GI tolerance/gut health.  5. Defer bowel regimen/simethicone to MD order.  6. Encourage po intake as tolerated and honor food preferences PRN.   7. Monitor PO intake/tolerance, weights, chem labs (including vitamin D, LFTs, lipase, H/H), BM's, and skin integrity.     -- Ashely Ravi, MS, RDN, CDN, Pager # [phone]

## 2024-10-14 NOTE — BH INPATIENT PSYCHIATRY PROGRESS NOTE - NSBHATTESTBILLING_PSY_A_CORE
16403-Rrbrvysjge OBS or IP - low complexity OR 25-34 mins 42298-Etocbuuwvz OBS or IP - moderate complexity OR 35-49 mins

## 2024-10-15 PROCEDURE — 99232 SBSQ HOSP IP/OBS MODERATE 35: CPT

## 2024-10-15 RX ORDER — CALCIUM CARBONATE 400(1000)
1 TABLET,CHEWABLE ORAL
Refills: 0 | Status: DISCONTINUED | OUTPATIENT
Start: 2024-10-15 | End: 2024-10-25

## 2024-10-15 RX ORDER — PANCRELIPASE 16800; 98400; 56800 [USP'U]/1; [USP'U]/1; [USP'U]/1
2 CAPSULE, DELAYED RELEASE ORAL
Refills: 0 | Status: DISCONTINUED | OUTPATIENT
Start: 2024-10-15 | End: 2024-10-25

## 2024-10-15 RX ADMIN — PANCRELIPASE 2 CAPSULE(S): 16800; 98400; 56800 CAPSULE, DELAYED RELEASE ORAL at 10:14

## 2024-10-15 RX ADMIN — PREGABALIN 25 MILLIGRAM(S): 150 CAPSULE ORAL at 20:33

## 2024-10-15 RX ADMIN — Medication 1 TABLET(S): at 16:35

## 2024-10-15 RX ADMIN — Medication 2 TABLET(S): at 20:33

## 2024-10-15 RX ADMIN — POLYETHYLENE GLYCOL 3350 17 GRAM(S): 17 POWDER, FOR SOLUTION ORAL at 13:08

## 2024-10-15 RX ADMIN — Medication 1000 UNIT(S): at 10:12

## 2024-10-15 RX ADMIN — PANCRELIPASE 1 CAPSULE(S): 16800; 98400; 56800 CAPSULE, DELAYED RELEASE ORAL at 10:23

## 2024-10-15 RX ADMIN — PREGABALIN 25 MILLIGRAM(S): 150 CAPSULE ORAL at 12:39

## 2024-10-15 RX ADMIN — PANCRELIPASE 1 CAPSULE(S): 16800; 98400; 56800 CAPSULE, DELAYED RELEASE ORAL at 12:46

## 2024-10-15 RX ADMIN — PANCRELIPASE 1 CAPSULE(S): 16800; 98400; 56800 CAPSULE, DELAYED RELEASE ORAL at 16:18

## 2024-10-15 RX ADMIN — DULOXETINE HYDROCHLORIDE 20 MILLIGRAM(S): 30 CAPSULE, DELAYED RELEASE ORAL at 10:13

## 2024-10-15 RX ADMIN — Medication 325 MILLIGRAM(S): at 10:12

## 2024-10-15 RX ADMIN — OXYCODONE HYDROCHLORIDE 2.5 MILLIGRAM(S): 30 TABLET ORAL at 02:00

## 2024-10-15 RX ADMIN — Medication 1 TABLET(S): at 10:12

## 2024-10-15 NOTE — BH INPATIENT PSYCHIATRY PROGRESS NOTE - NSBHMETABOLIC_PSY_ALL_CORE_FT
BMI: BMI (kg/m2): 16.1 (09-29-24 @ 08:14)  HbA1c:   Glucose:   BP: 107/67 (10-14-24 @ 20:43) (107/67 - 123/75)Vital Signs Last 24 Hrs  T(C): 36.3 (10-15-24 @ 07:47), Max: 36.9 (10-14-24 @ 20:43)  T(F): 97.3 (10-15-24 @ 07:47), Max: 98.4 (10-14-24 @ 20:43)  HR: 822 (10-14-24 @ 20:43) (70 - 822)  BP: 107/67 (10-14-24 @ 20:43) (107/67 - 123/75)  BP(mean): --  RR: 18 (10-14-24 @ 18:45) (18 - 19)  SpO2: 97% (10-14-24 @ 18:45) (97% - 98%)    Orthostatic VS  10-15-24 @ 07:47  Lying BP: --/-- HR: --  Sitting BP: 121/72 HR: 80  Standing BP: 117/80 HR: 81  Site: --  Mode: --    Lipid Panel:

## 2024-10-15 NOTE — BH INPATIENT PSYCHIATRY PROGRESS NOTE - NSBHCHARTREVIEWVS_PSY_A_CORE FT
Vital Signs Last 24 Hrs  T(C): 36.3 (10-15-24 @ 07:47), Max: 36.9 (10-14-24 @ 20:43)  T(F): 97.3 (10-15-24 @ 07:47), Max: 98.4 (10-14-24 @ 20:43)  HR: 822 (10-14-24 @ 20:43) (70 - 822)  BP: 107/67 (10-14-24 @ 20:43) (107/67 - 123/75)  BP(mean): --  RR: 18 (10-14-24 @ 18:45) (18 - 19)  SpO2: 97% (10-14-24 @ 18:45) (97% - 98%)    Orthostatic VS  10-15-24 @ 07:47  Lying BP: --/-- HR: --  Sitting BP: 121/72 HR: 80  Standing BP: 117/80 HR: 81  Site: --  Mode: --

## 2024-10-15 NOTE — BH INPATIENT PSYCHIATRY PROGRESS NOTE - NSBHASSESSSUMMFT_PSY_ALL_CORE
Patient is a 69 yo female with PPHx of depression and anxiety with no prior inpatient psychiatric hospitalizations, currently in outpatient treatment with psychiatrist Dr. Puente, and PMH significant for chronic back pain,  irritable bowel syndrome and osteoporosis with hx of compression fractures who presented s/p suicide attempt via overdose on her medication night prior to ED presentation on 9/29.     On arrival to  patient's presentation is consistent with MDD in the context of functional decline and worsening chronic pain over the past year. Pt reports low mood, hopelessness, anhedonia and passive SI. Denies active suicidal ideation, intent or plans now. At this time pt is very dysphoric and focused on being discharged. Agreed to start trial of Cymbalta, which she only took for 2-3 days before stopping it in the past.    10/11 Clinical Update: Continues to endorse low mood, feeling hopeless and unmotivated in the context of chronic pain. Declines to meet with hospitalist today to go over pain management and refuses to increase her dose of Cymbalta, afraid of side effects. Reports pain management today is "adequate". Preoccupied with discharge and attributing her depression to being in the hospital. Minimizing symptoms at home and suicide attempt. Would titrate Cymbalta if pt is amenable.  10/12 Dysphoric irritable, no SI, pain focus. Patient encouraged to give chance for Cymbalta to work to help pain and depressed mood  10/13 Irritable, somatically preoccupied with focus on receiving her Creon, focused on pain and constipation. Encouraged to adhere to Cymbalta and agreed to add daily miralax to regimen for constipation. If develop loose BM, will reduce to PRN.  10/14: Dysphoric, irritable, partially compliant with Cymbalta. Continues to report constipation, will continue standing senna, miralax and prn dulcolax. Switched PRN ativan for PRN valium 5mg twice daily. Pt was on this dose of valium prn in J and reports better results for both anxiety and pain control. Added OsCal supplementation as per patient's request. Full spine MRI without contrast ordered and discussed with MRI supervisor and hospitalist. Will discuss with anesthesia. Pt will have to be NPO after midnight, discussed with nursing.   10/15: Pt is under better spirits today, denies SI/HI and seems future focused. Adamantly refusing MRI now. Patient's affect and decision making seems highly dependent on her perception of pain control at any given time. Thus far any improvement has been highly inconsistent. Pt refusing to take higher doses of Cymbalta for now.     -Admit to [facility] 2S  -CO for hospital bed  -Legal status 2PC  -Continue Cymbalta 20mg daily for depression  -Continue pain management as per Pain Medicine recs --> Lyrica 25mg PO TID and Oxycodone 2.5mg PO every 4 hrs PRN  -Full spine MRI without contrast, with anesthesia as per patient's request for pain management pending.  -NPO AFTER MIDNIGHT for MRI  -PT consult  -Nutrition consult complete, recs appreciated  -Pt would benefit from meeting with the  when available

## 2024-10-15 NOTE — BH INPATIENT PSYCHIATRY PROGRESS NOTE - NSBHFUPINTERVALHXFT_PSY_A_CORE
Ms. Diaz was seen today for follow up of depression. Remains on CO for hospital bed. Received Oxycodone prn at 2am. Compliant with Cymbalta since Monday.   Pt now refusing to have spinal MRI done despite requesting it yesterday. Pt states her pain is under better control today and she is afraid of suffering another compression fracture from the transfer to the MRI and lying flat for the study. Writer strongly encouraged pt to reconsider since her pain management will likely not change unless we have updated imaging. Pt adamantly declining MRI or CT at this point. Pt also stating her mood has improved and shared with writer her thoughts on her own safety plan once she is discharged and ways she can continue working on her mood, mobility and socialization at home. Pt admits her affect is strongly tied to her pain and today she feels much better because "my pain is not as bad as it was yesterday". Writer asked pt how she could cope differently if her pain were as bad as it was yesterday and pt seems to minimize, stating now she is much better.

## 2024-10-16 PROCEDURE — 99232 SBSQ HOSP IP/OBS MODERATE 35: CPT | Mod: 25

## 2024-10-16 PROCEDURE — 99232 SBSQ HOSP IP/OBS MODERATE 35: CPT

## 2024-10-16 RX ADMIN — PANCRELIPASE 1 CAPSULE(S): 16800; 98400; 56800 CAPSULE, DELAYED RELEASE ORAL at 16:09

## 2024-10-16 RX ADMIN — Medication 1000 UNIT(S): at 09:15

## 2024-10-16 RX ADMIN — PREGABALIN 25 MILLIGRAM(S): 150 CAPSULE ORAL at 12:53

## 2024-10-16 RX ADMIN — Medication 1 TABLET(S): at 16:09

## 2024-10-16 RX ADMIN — PREGABALIN 25 MILLIGRAM(S): 150 CAPSULE ORAL at 20:50

## 2024-10-16 RX ADMIN — Medication 2 TABLET(S): at 20:49

## 2024-10-16 RX ADMIN — DULOXETINE HYDROCHLORIDE 20 MILLIGRAM(S): 30 CAPSULE, DELAYED RELEASE ORAL at 09:15

## 2024-10-16 RX ADMIN — PANCRELIPASE 1 CAPSULE(S): 16800; 98400; 56800 CAPSULE, DELAYED RELEASE ORAL at 11:14

## 2024-10-16 RX ADMIN — Medication 1 TABLET(S): at 09:15

## 2024-10-16 RX ADMIN — DIAZEPAM 5 MILLIGRAM(S): 10 FILM BUCCAL at 10:45

## 2024-10-16 RX ADMIN — PREGABALIN 25 MILLIGRAM(S): 150 CAPSULE ORAL at 09:15

## 2024-10-16 RX ADMIN — PANCRELIPASE 1 CAPSULE(S): 16800; 98400; 56800 CAPSULE, DELAYED RELEASE ORAL at 09:14

## 2024-10-16 NOTE — BH INPATIENT PSYCHIATRY PROGRESS NOTE - NSBHASSESSSUMMFT_PSY_ALL_CORE
Patient is a 69 yo female with PPHx of depression and anxiety with no prior inpatient psychiatric hospitalizations, currently in outpatient treatment with psychiatrist Dr. Puente, and PMH significant for chronic back pain,  irritable bowel syndrome and osteoporosis with hx of compression fractures who presented s/p suicide attempt via overdose on her medication night prior to ED presentation on 9/29.     On arrival to  patient's presentation is consistent with MDD in the context of functional decline and worsening chronic pain over the past year. Pt reports low mood, hopelessness, anhedonia and passive SI. Denies active suicidal ideation, intent or plans now. At this time pt is very dysphoric and focused on being discharged. Agreed to start trial of Cymbalta, which she only took for 2-3 days before stopping it in the past.    10/11 Clinical Update: Continues to endorse low mood, feeling hopeless and unmotivated in the context of chronic pain. Declines to meet with hospitalist today to go over pain management and refuses to increase her dose of Cymbalta, afraid of side effects. Reports pain management today is "adequate". Preoccupied with discharge and attributing her depression to being in the hospital. Minimizing symptoms at home and suicide attempt. Would titrate Cymbalta if pt is amenable.  10/12 Dysphoric irritable, no SI, pain focus. Patient encouraged to give chance for Cymbalta to work to help pain and depressed mood  10/13 Irritable, somatically preoccupied with focus on receiving her Creon, focused on pain and constipation. Encouraged to adhere to Cymbalta and agreed to add daily miralax to regimen for constipation. If develop loose BM, will reduce to PRN.  10/14: Dysphoric, irritable, partially compliant with Cymbalta. Continues to report constipation, will continue standing senna, miralax and prn dulcolax. Switched PRN ativan for PRN valium 5mg twice daily. Pt was on this dose of valium prn in J and reports better results for both anxiety and pain control. Added OsCal supplementation as per patient's request. Full spine MRI without contrast ordered and discussed with MRI supervisor and hospitalist. Will discuss with anesthesia. Pt will have to be NPO after midnight, discussed with nursing.   10/15: Pt is under better spirits today, denies SI/HI and seems future focused. Adamantly refusing MRI now. Patient's affect and decision making seems highly dependent on her perception of pain control at any given time. Thus far any improvement has been highly inconsistent. Pt refusing to take higher doses of Cymbalta for now.   10/16: Pt is more dysphoric today. Presents catastrophic thinking and very limited frustration tolerance. Refusing to go up on her dose of Cymbalta. Continues to refuse MRI.    -Admit to [facility] 2S  -CO for hospital bed  -Legal status 2PC  -Continue Cymbalta 20mg daily for depression  -Continue pain management as per Pain Medicine recs --> Lyrica 25mg PO TID and Oxycodone 2.5mg PO every 4 hrs PRN  -Full spine MRI without contrast, with anesthesia as per patient's request for pain management pending.  -NPO AFTER MIDNIGHT for MRI  -PT consult  -Nutrition consult complete, recs appreciated  -Pt would benefit from meeting with the  when available

## 2024-10-16 NOTE — BH INPATIENT PSYCHIATRY PROGRESS NOTE - NSBHCHARTREVIEWVS_PSY_A_CORE FT
Vital Signs Last 24 Hrs  T(C): 36.8 (10-16-24 @ 07:49), Max: 36.8 (10-16-24 @ 07:49)  T(F): 98.2 (10-16-24 @ 07:49), Max: 98.2 (10-16-24 @ 07:49)  HR: --  BP: --  BP(mean): --  RR: --  SpO2: --    Orthostatic VS  10-16-24 @ 07:49  Lying BP: --/-- HR: --  Sitting BP: 119/76 HR: 70  Standing BP: 120/73 HR: 78  Site: --  Mode: --  Orthostatic VS  10-15-24 @ 07:47  Lying BP: --/-- HR: --  Sitting BP: 121/72 HR: 80  Standing BP: 117/80 HR: 81  Site: --  Mode: --

## 2024-10-16 NOTE — BH INPATIENT PSYCHIATRY PROGRESS NOTE - NSBHFUPINTERVALHXFT_PSY_A_CORE
Ms. Diaz was seen today for follow up of depression. Remains on CO for hospital bed. Last received Oxycodone prn on 10/15 at 2am. Compliant with Cymbalta since Monday.   On encounter today pt presents more constricted affect compared to yesterday. Reports feeling upset her breakfast is by her bedside and she had not received her morning medications yet. Med passes had just started at 9am. Pt wants to have her Creon right before she starts her meal. Reports low mood and hopelessness "I don't know how to go one like this".    brought a large number of vitamin and supplement bottles from home last night. Most of them redundant (multiple bottles of D3, calcium, magnesium supplements and various bottles of lipoic acid). Discussed with pharmacy and relayed to pt that we cannot provide supplements that are not fda approved in the hospital. She is already taking a multivitamin daily and Oscal. Pt verbalized understanding.

## 2024-10-16 NOTE — BH INPATIENT PSYCHIATRY PROGRESS NOTE - NSBHMETABOLIC_PSY_ALL_CORE_FT
BMI: BMI (kg/m2): 16.1 (09-29-24 @ 08:14)  HbA1c:   Glucose:   BP: 107/67 (10-14-24 @ 20:43) (107/67 - 123/75)Vital Signs Last 24 Hrs  T(C): 36.8 (10-16-24 @ 07:49), Max: 36.8 (10-16-24 @ 07:49)  T(F): 98.2 (10-16-24 @ 07:49), Max: 98.2 (10-16-24 @ 07:49)  HR: --  BP: --  BP(mean): --  RR: --  SpO2: --    Orthostatic VS  10-16-24 @ 07:49  Lying BP: --/-- HR: --  Sitting BP: 119/76 HR: 70  Standing BP: 120/73 HR: 78  Site: --  Mode: --  Orthostatic VS  10-15-24 @ 07:47  Lying BP: --/-- HR: --  Sitting BP: 121/72 HR: 80  Standing BP: 117/80 HR: 81  Site: --  Mode: --    Lipid Panel:

## 2024-10-17 PROCEDURE — 99232 SBSQ HOSP IP/OBS MODERATE 35: CPT

## 2024-10-17 RX ORDER — MAGNESIUM CITRATE
296 SOLUTION, ORAL ORAL ONCE
Refills: 0 | Status: COMPLETED | OUTPATIENT
Start: 2024-10-17 | End: 2024-10-18

## 2024-10-17 RX ADMIN — Medication 2 TABLET(S): at 20:29

## 2024-10-17 RX ADMIN — Medication 3 MILLIGRAM(S): at 01:09

## 2024-10-17 RX ADMIN — PREGABALIN 25 MILLIGRAM(S): 150 CAPSULE ORAL at 10:08

## 2024-10-17 RX ADMIN — DIAZEPAM 5 MILLIGRAM(S): 10 FILM BUCCAL at 14:22

## 2024-10-17 RX ADMIN — Medication 1000 UNIT(S): at 10:09

## 2024-10-17 RX ADMIN — Medication 1 TABLET(S): at 10:08

## 2024-10-17 RX ADMIN — SIMETHICONE 80 MILLIGRAM(S): 80 TABLET, CHEWABLE ORAL at 23:12

## 2024-10-17 RX ADMIN — Medication 325 MILLIGRAM(S): at 10:09

## 2024-10-17 RX ADMIN — PANCRELIPASE 1 CAPSULE(S): 16800; 98400; 56800 CAPSULE, DELAYED RELEASE ORAL at 16:28

## 2024-10-17 RX ADMIN — DULOXETINE HYDROCHLORIDE 20 MILLIGRAM(S): 30 CAPSULE, DELAYED RELEASE ORAL at 10:08

## 2024-10-17 RX ADMIN — DIAZEPAM 5 MILLIGRAM(S): 10 FILM BUCCAL at 03:08

## 2024-10-17 RX ADMIN — PANCRELIPASE 1 CAPSULE(S): 16800; 98400; 56800 CAPSULE, DELAYED RELEASE ORAL at 09:00

## 2024-10-17 RX ADMIN — POLYETHYLENE GLYCOL 3350 17 GRAM(S): 17 POWDER, FOR SOLUTION ORAL at 10:09

## 2024-10-17 RX ADMIN — Medication 1 TABLET(S): at 16:29

## 2024-10-17 RX ADMIN — PREGABALIN 25 MILLIGRAM(S): 150 CAPSULE ORAL at 20:30

## 2024-10-17 NOTE — BH INPATIENT PSYCHIATRY PROGRESS NOTE - NSBHFUPINTERVALHXFT_PSY_A_CORE
Ms. Diaz was seen today for follow up of depression. Remains on CO for hospital bed. Compliant with Cymbalta since Monday.   On encounter pt is still withdrawn, dysphoric and pessimistic. Complains of constipation, just received Miralax, encouraged pt to wait. Pt reports low mood, hopelessness and not knowing how to cope. Of note, yesterday a group of [employer] ladies came to the unit to pray the rosary with patients. Pt is a devout [employer] who typically enjoys engaging in prayer and activities like this. Reports that she felt too uncomfortable in our chairs and did not participate. Writer encouraged pt to look at her coping skills that she herself came up with earlier this week but pt states "nothing can help me know, I've been using my lavender and music but that doesn't help". Pt still adamantly refusing to take higher doses of Cymbalta for her mood and pain. Offered to increase dose from 20mg to 30mg but she refused.   Spoke with  over the phone and provided treatment updates.

## 2024-10-17 NOTE — BH INPATIENT PSYCHIATRY PROGRESS NOTE - NSBHMETABOLIC_PSY_ALL_CORE_FT
BMI: BMI (kg/m2): 16.1 (09-29-24 @ 08:14)  HbA1c:   Glucose:   BP: 107/67 (10-14-24 @ 20:43) (107/67 - 123/75)Vital Signs Last 24 Hrs  T(C): 36.5 (10-17-24 @ 07:41), Max: 36.5 (10-17-24 @ 07:41)  T(F): 97.7 (10-17-24 @ 07:41), Max: 97.7 (10-17-24 @ 07:41)  HR: --  BP: --  BP(mean): --  RR: 18 (10-17-24 @ 07:41) (18 - 18)  SpO2: --    Orthostatic VS  10-17-24 @ 07:41  Lying BP: --/-- HR: --  Sitting BP: 104/65 HR: 63  Standing BP: 100/66 HR: 68  Site: --  Mode: --  Orthostatic VS  10-16-24 @ 07:49  Lying BP: --/-- HR: --  Sitting BP: 119/76 HR: 70  Standing BP: 120/73 HR: 78  Site: --  Mode: --    Lipid Panel:

## 2024-10-17 NOTE — BH INPATIENT PSYCHIATRY PROGRESS NOTE - NSBHASSESSSUMMFT_PSY_ALL_CORE
Patient is a 67 yo female with PPHx of depression and anxiety with no prior inpatient psychiatric hospitalizations, currently in outpatient treatment with psychiatrist Dr. Puente, and PMH significant for chronic back pain,  irritable bowel syndrome and osteoporosis with hx of compression fractures who presented s/p suicide attempt via overdose on her medication night prior to ED presentation on 9/29.     On arrival to  patient's presentation is consistent with MDD in the context of functional decline and worsening chronic pain over the past year. Pt reports low mood, hopelessness, anhedonia and passive SI. Denies active suicidal ideation, intent or plans now. At this time pt is very dysphoric and focused on being discharged. Agreed to start trial of Cymbalta, which she only took for 2-3 days before stopping it in the past.    10/11 Clinical Update: Continues to endorse low mood, feeling hopeless and unmotivated in the context of chronic pain. Declines to meet with hospitalist today to go over pain management and refuses to increase her dose of Cymbalta, afraid of side effects. Reports pain management today is "adequate". Preoccupied with discharge and attributing her depression to being in the hospital. Minimizing symptoms at home and suicide attempt. Would titrate Cymbalta if pt is amenable.  10/12 Dysphoric irritable, no SI, pain focus. Patient encouraged to give chance for Cymbalta to work to help pain and depressed mood  10/13 Irritable, somatically preoccupied with focus on receiving her Creon, focused on pain and constipation. Encouraged to adhere to Cymbalta and agreed to add daily miralax to regimen for constipation. If develop loose BM, will reduce to PRN.  10/14: Dysphoric, irritable, partially compliant with Cymbalta. Continues to report constipation, will continue standing senna, miralax and prn dulcolax. Switched PRN ativan for PRN valium 5mg twice daily. Pt was on this dose of valium prn in J and reports better results for both anxiety and pain control. Added OsCal supplementation as per patient's request. Full spine MRI without contrast ordered and discussed with MRI supervisor and hospitalist. Will discuss with anesthesia. Pt will have to be NPO after midnight, discussed with nursing.   10/15: Pt is under better spirits today, denies SI/HI and seems future focused. Adamantly refusing MRI now. Patient's affect and decision making seems highly dependent on her perception of pain control at any given time. Thus far any improvement has been highly inconsistent. Pt refusing to take higher doses of Cymbalta for now.   10/16: Pt is more dysphoric today. Presents catastrophic thinking and very limited frustration tolerance. Refusing to go up on her dose of Cymbalta. Continues to refuse MRI.  10/17: Dysphoric, withdrawn, negative and expressing hopelessness. Refusing to go up on Cymbalta. Spoke with  who will meet with pt today and encourage her to consider trying a higher dose of her antidepressant.    -Admit to [facility] 2S  -CO for hospital bed  -Legal status 2PC  -Continue Cymbalta 20mg daily for depression  -Continue pain management as per Pain Medicine recs --> Lyrica 25mg PO TID and Oxycodone 2.5mg PO every 4 hrs PRN  -Full spine MRI without contrast, with anesthesia as per patient's request for pain management pending.  -NPO AFTER MIDNIGHT for MRI  -PT consult  -Nutrition consult complete, recs appreciated  -Pt would benefit from meeting with the  when available

## 2024-10-17 NOTE — BH INPATIENT PSYCHIATRY PROGRESS NOTE - NSBHCHARTREVIEWVS_PSY_A_CORE FT
Vital Signs Last 24 Hrs  T(C): 36.5 (10-17-24 @ 07:41), Max: 36.5 (10-17-24 @ 07:41)  T(F): 97.7 (10-17-24 @ 07:41), Max: 97.7 (10-17-24 @ 07:41)  HR: --  BP: --  BP(mean): --  RR: 18 (10-17-24 @ 07:41) (18 - 18)  SpO2: --    Orthostatic VS  10-17-24 @ 07:41  Lying BP: --/-- HR: --  Sitting BP: 104/65 HR: 63  Standing BP: 100/66 HR: 68  Site: --  Mode: --  Orthostatic VS  10-16-24 @ 07:49  Lying BP: --/-- HR: --  Sitting BP: 119/76 HR: 70  Standing BP: 120/73 HR: 78  Site: --  Mode: --

## 2024-10-18 PROCEDURE — 99232 SBSQ HOSP IP/OBS MODERATE 35: CPT

## 2024-10-18 RX ORDER — DULOXETINE HYDROCHLORIDE 30 MG/1
30 CAPSULE, DELAYED RELEASE ORAL DAILY
Refills: 0 | Status: DISCONTINUED | OUTPATIENT
Start: 2024-10-18 | End: 2024-10-25

## 2024-10-18 RX ADMIN — DULOXETINE HYDROCHLORIDE 30 MILLIGRAM(S): 30 CAPSULE, DELAYED RELEASE ORAL at 09:46

## 2024-10-18 RX ADMIN — Medication 1 TABLET(S): at 09:45

## 2024-10-18 RX ADMIN — Medication 1 TABLET(S): at 16:33

## 2024-10-18 RX ADMIN — Medication 325 MILLIGRAM(S): at 09:46

## 2024-10-18 RX ADMIN — PREGABALIN 25 MILLIGRAM(S): 150 CAPSULE ORAL at 09:46

## 2024-10-18 RX ADMIN — PANCRELIPASE 2 CAPSULE(S): 16800; 98400; 56800 CAPSULE, DELAYED RELEASE ORAL at 18:18

## 2024-10-18 RX ADMIN — Medication 1000 UNIT(S): at 09:46

## 2024-10-18 RX ADMIN — OXYCODONE HYDROCHLORIDE 2.5 MILLIGRAM(S): 30 TABLET ORAL at 12:40

## 2024-10-18 RX ADMIN — OXYCODONE HYDROCHLORIDE 2.5 MILLIGRAM(S): 30 TABLET ORAL at 11:41

## 2024-10-18 RX ADMIN — PANCRELIPASE 1 CAPSULE(S): 16800; 98400; 56800 CAPSULE, DELAYED RELEASE ORAL at 09:45

## 2024-10-18 RX ADMIN — PREGABALIN 25 MILLIGRAM(S): 150 CAPSULE ORAL at 13:03

## 2024-10-18 RX ADMIN — PANCRELIPASE 1 CAPSULE(S): 16800; 98400; 56800 CAPSULE, DELAYED RELEASE ORAL at 13:03

## 2024-10-18 RX ADMIN — DIAZEPAM 5 MILLIGRAM(S): 10 FILM BUCCAL at 22:50

## 2024-10-18 RX ADMIN — Medication 2 TABLET(S): at 21:41

## 2024-10-18 RX ADMIN — POLYETHYLENE GLYCOL 3350 17 GRAM(S): 17 POWDER, FOR SOLUTION ORAL at 09:47

## 2024-10-18 RX ADMIN — PANCRELIPASE 1 CAPSULE(S): 16800; 98400; 56800 CAPSULE, DELAYED RELEASE ORAL at 16:33

## 2024-10-18 RX ADMIN — Medication 296 MILLILITER(S): at 13:49

## 2024-10-18 RX ADMIN — PREGABALIN 25 MILLIGRAM(S): 150 CAPSULE ORAL at 21:41

## 2024-10-18 NOTE — BH INPATIENT PSYCHIATRY PROGRESS NOTE - NSBHMETABOLIC_PSY_ALL_CORE_FT
BMI: BMI (kg/m2): 16.1 (09-29-24 @ 08:14)  HbA1c:   Glucose:   BP: --Vital Signs Last 24 Hrs  T(C): 36.5 (10-18-24 @ 08:26), Max: 36.5 (10-18-24 @ 08:26)  T(F): 97.7 (10-18-24 @ 08:26), Max: 97.7 (10-18-24 @ 08:26)  HR: --  BP: --  BP(mean): --  RR: --  SpO2: --    Orthostatic VS  10-18-24 @ 08:26  Lying BP: 113/70 HR: 66  Sitting BP: 107/69 HR: 66  Standing BP: --/-- HR: --  Site: --  Mode: --  Orthostatic VS  10-17-24 @ 07:41  Lying BP: --/-- HR: --  Sitting BP: 104/65 HR: 63  Standing BP: 100/66 HR: 68  Site: --  Mode: --    Lipid Panel:

## 2024-10-18 NOTE — BH INPATIENT PSYCHIATRY PROGRESS NOTE - NSBHCHARTREVIEWVS_PSY_A_CORE FT
Vital Signs Last 24 Hrs  T(C): 36.5 (10-18-24 @ 08:26), Max: 36.5 (10-18-24 @ 08:26)  T(F): 97.7 (10-18-24 @ 08:26), Max: 97.7 (10-18-24 @ 08:26)  HR: --  BP: --  BP(mean): --  RR: --  SpO2: --    Orthostatic VS  10-18-24 @ 08:26  Lying BP: 113/70 HR: 66  Sitting BP: 107/69 HR: 66  Standing BP: --/-- HR: --  Site: --  Mode: --  Orthostatic VS  10-17-24 @ 07:41  Lying BP: --/-- HR: --  Sitting BP: 104/65 HR: 63  Standing BP: 100/66 HR: 68  Site: --  Mode: --

## 2024-10-18 NOTE — BH INPATIENT PSYCHIATRY PROGRESS NOTE - NSBHASSESSSUMMFT_PSY_ALL_CORE
Patient is a 69 yo female with PPHx of depression and anxiety with no prior inpatient psychiatric hospitalizations, currently in outpatient treatment with psychiatrist Dr. Puente, and PMH significant for chronic back pain,  irritable bowel syndrome and osteoporosis with hx of compression fractures who presented s/p suicide attempt via overdose on her medication night prior to ED presentation on 9/29.     On arrival to  patient's presentation is consistent with MDD in the context of functional decline and worsening chronic pain over the past year. Pt reports low mood, hopelessness, anhedonia and passive SI. Denies active suicidal ideation, intent or plans now. At this time pt is very dysphoric and focused on being discharged. Agreed to start trial of Cymbalta, which she only took for 2-3 days before stopping it in the past.    10/11 Clinical Update: Continues to endorse low mood, feeling hopeless and unmotivated in the context of chronic pain. Declines to meet with hospitalist today to go over pain management and refuses to increase her dose of Cymbalta, afraid of side effects. Reports pain management today is "adequate". Preoccupied with discharge and attributing her depression to being in the hospital. Minimizing symptoms at home and suicide attempt. Would titrate Cymbalta if pt is amenable.  10/12 Dysphoric irritable, no SI, pain focus. Patient encouraged to give chance for Cymbalta to work to help pain and depressed mood  10/13 Irritable, somatically preoccupied with focus on receiving her Creon, focused on pain and constipation. Encouraged to adhere to Cymbalta and agreed to add daily miralax to regimen for constipation. If develop loose BM, will reduce to PRN.  10/14: Dysphoric, irritable, partially compliant with Cymbalta. Continues to report constipation, will continue standing senna, miralax and prn dulcolax. Switched PRN ativan for PRN valium 5mg twice daily. Pt was on this dose of valium prn in J and reports better results for both anxiety and pain control. Added OsCal supplementation as per patient's request. Full spine MRI without contrast ordered and discussed with MRI supervisor and hospitalist. Will discuss with anesthesia. Pt will have to be NPO after midnight, discussed with nursing.   10/15: Pt is under better spirits today, denies SI/HI and seems future focused. Adamantly refusing MRI now. Patient's affect and decision making seems highly dependent on her perception of pain control at any given time. Thus far any improvement has been highly inconsistent. Pt refusing to take higher doses of Cymbalta for now.   10/16: Pt is more dysphoric today. Presents catastrophic thinking and very limited frustration tolerance. Refusing to go up on her dose of Cymbalta. Continues to refuse MRI.  10/17: Dysphoric, withdrawn, negative and expressing hopelessness. Refusing to go up on Cymbalta. Spoke with  who will meet with pt today and encourage her to consider trying a higher dose of her antidepressant.  10/18: Remains dysphoric, pessimistic and anhedonic. Agreed to increase her dose of Cymbalta to 30mg.     -Admit to [facility] 2S  -CO for hospital bed  -Legal status 2PC  -Continue Cymbalta 30mg daily for depression (inc from 20mg on 10/18)  -Continue pain management as per Pain Medicine recs --> Lyrica 25mg PO TID and Oxycodone 2.5mg PO every 4 hrs PRN  -Full spine MRI without contrast, with anesthesia as per patient's request for pain management pending.  -NPO AFTER MIDNIGHT for MRI  -PT consult  -Nutrition consult complete, recs appreciated  -Pt would benefit from meeting with the  when available

## 2024-10-18 NOTE — BH INPATIENT PSYCHIATRY PROGRESS NOTE - NSBHFUPINTERVALHXFT_PSY_A_CORE
Ms. Diaz was seen today for follow up of depression. Remains on CO for hospital bed. Compliant with Cymbalta since Monday.   Endorsing low mood, anhedonia, feeling unmotivated and upset due to the limitations of being in the hospital. Accepted to go up on her Cymbalta from 20mg to 30mg. Encouraged pt to employ her coping skills.

## 2024-10-19 PROCEDURE — 99232 SBSQ HOSP IP/OBS MODERATE 35: CPT

## 2024-10-19 RX ADMIN — Medication 1 TABLET(S): at 16:32

## 2024-10-19 RX ADMIN — POLYETHYLENE GLYCOL 3350 17 GRAM(S): 17 POWDER, FOR SOLUTION ORAL at 08:20

## 2024-10-19 RX ADMIN — PANCRELIPASE 1 CAPSULE(S): 16800; 98400; 56800 CAPSULE, DELAYED RELEASE ORAL at 08:20

## 2024-10-19 RX ADMIN — Medication 1000 UNIT(S): at 08:19

## 2024-10-19 RX ADMIN — Medication 2 TABLET(S): at 19:47

## 2024-10-19 RX ADMIN — PANCRELIPASE 1 CAPSULE(S): 16800; 98400; 56800 CAPSULE, DELAYED RELEASE ORAL at 11:57

## 2024-10-19 RX ADMIN — PREGABALIN 25 MILLIGRAM(S): 150 CAPSULE ORAL at 19:47

## 2024-10-19 RX ADMIN — PREGABALIN 25 MILLIGRAM(S): 150 CAPSULE ORAL at 12:20

## 2024-10-19 RX ADMIN — Medication 1 TABLET(S): at 08:19

## 2024-10-19 RX ADMIN — SIMETHICONE 80 MILLIGRAM(S): 80 TABLET, CHEWABLE ORAL at 01:13

## 2024-10-19 RX ADMIN — Medication 325 MILLIGRAM(S): at 08:19

## 2024-10-19 RX ADMIN — PREGABALIN 25 MILLIGRAM(S): 150 CAPSULE ORAL at 08:19

## 2024-10-19 RX ADMIN — PANCRELIPASE 1 CAPSULE(S): 16800; 98400; 56800 CAPSULE, DELAYED RELEASE ORAL at 16:32

## 2024-10-19 RX ADMIN — OXYCODONE HYDROCHLORIDE 2.5 MILLIGRAM(S): 30 TABLET ORAL at 23:09

## 2024-10-19 RX ADMIN — PANCRELIPASE 2 CAPSULE(S): 16800; 98400; 56800 CAPSULE, DELAYED RELEASE ORAL at 08:18

## 2024-10-19 RX ADMIN — DULOXETINE HYDROCHLORIDE 30 MILLIGRAM(S): 30 CAPSULE, DELAYED RELEASE ORAL at 08:20

## 2024-10-19 RX ADMIN — DIAZEPAM 5 MILLIGRAM(S): 10 FILM BUCCAL at 13:50

## 2024-10-19 NOTE — BH INPATIENT PSYCHIATRY PROGRESS NOTE - NSBHMETABOLIC_PSY_ALL_CORE_FT
BMI: BMI (kg/m2): 16.1 (09-29-24 @ 08:14)  HbA1c:   Glucose:   BP: --Vital Signs Last 24 Hrs  T(C): 36.4 (10-19-24 @ 07:41), Max: 36.4 (10-19-24 @ 07:41)  T(F): 97.5 (10-19-24 @ 07:41), Max: 97.5 (10-19-24 @ 07:41)  HR: --  BP: --  BP(mean): --  RR: --  SpO2: --    Orthostatic VS  10-19-24 @ 07:41  Lying BP: --/-- HR: --  Sitting BP: 92/61 HR: 66  Standing BP: 102/69 HR: 67  Site: --  Mode: --  Orthostatic VS  10-18-24 @ 08:26  Lying BP: 113/70 HR: 66  Sitting BP: 107/69 HR: 66  Standing BP: --/-- HR: --  Site: --  Mode: --    Lipid Panel:

## 2024-10-19 NOTE — BH INPATIENT PSYCHIATRY PROGRESS NOTE - NSBHFUPINTERVALHXFT_PSY_A_CORE
Ms. Diaz was seen today for follow up of depression. Chart reviewed and discussed with nursing staff. Remains on CO for hospital bed. She is compliant with medication, no acute events overnight/this am. Pt received PRN valium last night. On encounter, Pt reports feeling ok, sleep difficult due to interruption from noises, appetite is ok. Feels sad being in hospital, valium helps her anxiety. Denies any SI, HI, janey, paranoia or hallucination.

## 2024-10-19 NOTE — BH INPATIENT PSYCHIATRY PROGRESS NOTE - NSBHASSESSSUMMFT_PSY_ALL_CORE
Patient is a 67 yo female with PPHx of depression and anxiety with no prior inpatient psychiatric hospitalizations, currently in outpatient treatment with psychiatrist Dr. Puente, and PMH significant for chronic back pain,  irritable bowel syndrome and osteoporosis with hx of compression fractures who presented s/p suicide attempt via overdose on her medication night prior to ED presentation on 9/29.     On arrival to  patient's presentation is consistent with MDD in the context of functional decline and worsening chronic pain over the past year. Pt reports low mood, hopelessness, anhedonia and passive SI. Denies active suicidal ideation, intent or plans now. At this time pt is very dysphoric and focused on being discharged. Agreed to start trial of Cymbalta, which she only took for 2-3 days before stopping it in the past.    10/11 Clinical Update: Continues to endorse low mood, feeling hopeless and unmotivated in the context of chronic pain. Declines to meet with hospitalist today to go over pain management and refuses to increase her dose of Cymbalta, afraid of side effects. Reports pain management today is "adequate". Preoccupied with discharge and attributing her depression to being in the hospital. Minimizing symptoms at home and suicide attempt. Would titrate Cymbalta if pt is amenable.  10/12 Dysphoric irritable, no SI, pain focus. Patient encouraged to give chance for Cymbalta to work to help pain and depressed mood  10/13 Irritable, somatically preoccupied with focus on receiving her Creon, focused on pain and constipation. Encouraged to adhere to Cymbalta and agreed to add daily miralax to regimen for constipation. If develop loose BM, will reduce to PRN.  10/14: Dysphoric, irritable, partially compliant with Cymbalta. Continues to report constipation, will continue standing senna, miralax and prn dulcolax. Switched PRN ativan for PRN valium 5mg twice daily. Pt was on this dose of valium prn in J and reports better results for both anxiety and pain control. Added OsCal supplementation as per patient's request. Full spine MRI without contrast ordered and discussed with MRI supervisor and hospitalist. Will discuss with anesthesia. Pt will have to be NPO after midnight, discussed with nursing.   10/15: Pt is under better spirits today, denies SI/HI and seems future focused. Adamantly refusing MRI now. Patient's affect and decision making seems highly dependent on her perception of pain control at any given time. Thus far any improvement has been highly inconsistent. Pt refusing to take higher doses of Cymbalta for now.   10/16: Pt is more dysphoric today. Presents catastrophic thinking and very limited frustration tolerance. Refusing to go up on her dose of Cymbalta. Continues to refuse MRI.  10/17: Dysphoric, withdrawn, negative and expressing hopelessness. Refusing to go up on Cymbalta. Spoke with  who will meet with pt today and encourage her to consider trying a higher dose of her antidepressant.  10/18: Remains dysphoric, pessimistic and anhedonic. Agreed to increase her dose of Cymbalta to 30mg.   10/19 anxious, dysphoric, no SI/HI, no psychosis. Tolerating cymbalta well.    -Admit to [facility] 2S  -CO for hospital bed  -Legal status 2PC  -Continue Cymbalta 30mg daily for depression (inc from 20mg on 10/18)  -Continue pain management as per Pain Medicine recs --> Lyrica 25mg PO TID and Oxycodone 2.5mg PO every 4 hrs PRN  -Full spine MRI without contrast, with anesthesia as per patient's request for pain management pending.  -NPO AFTER MIDNIGHT for MRI  -PT consult  -Nutrition consult complete, recs appreciated  -Pt would benefit from meeting with the  when available

## 2024-10-19 NOTE — BH INPATIENT PSYCHIATRY PROGRESS NOTE - NSBHCHARTREVIEWVS_PSY_A_CORE FT
Vital Signs Last 24 Hrs  T(C): 36.4 (10-19-24 @ 07:41), Max: 36.4 (10-19-24 @ 07:41)  T(F): 97.5 (10-19-24 @ 07:41), Max: 97.5 (10-19-24 @ 07:41)  HR: --  BP: --  BP(mean): --  RR: --  SpO2: --    Orthostatic VS  10-19-24 @ 07:41  Lying BP: --/-- HR: --  Sitting BP: 92/61 HR: 66  Standing BP: 102/69 HR: 67  Site: --  Mode: --  Orthostatic VS  10-18-24 @ 08:26  Lying BP: 113/70 HR: 66  Sitting BP: 107/69 HR: 66  Standing BP: --/-- HR: --  Site: --  Mode: --

## 2024-10-20 PROCEDURE — 99232 SBSQ HOSP IP/OBS MODERATE 35: CPT

## 2024-10-20 RX ADMIN — PANCRELIPASE 1 CAPSULE(S): 16800; 98400; 56800 CAPSULE, DELAYED RELEASE ORAL at 08:35

## 2024-10-20 RX ADMIN — DULOXETINE HYDROCHLORIDE 30 MILLIGRAM(S): 30 CAPSULE, DELAYED RELEASE ORAL at 08:33

## 2024-10-20 RX ADMIN — Medication 2 TABLET(S): at 21:33

## 2024-10-20 RX ADMIN — PANCRELIPASE 2 CAPSULE(S): 16800; 98400; 56800 CAPSULE, DELAYED RELEASE ORAL at 08:27

## 2024-10-20 RX ADMIN — Medication 1 TABLET(S): at 16:27

## 2024-10-20 RX ADMIN — DIAZEPAM 5 MILLIGRAM(S): 10 FILM BUCCAL at 21:34

## 2024-10-20 RX ADMIN — PANCRELIPASE 1 CAPSULE(S): 16800; 98400; 56800 CAPSULE, DELAYED RELEASE ORAL at 16:28

## 2024-10-20 RX ADMIN — PANCRELIPASE 1 CAPSULE(S): 16800; 98400; 56800 CAPSULE, DELAYED RELEASE ORAL at 12:35

## 2024-10-20 RX ADMIN — Medication 1000 UNIT(S): at 08:33

## 2024-10-20 RX ADMIN — PREGABALIN 25 MILLIGRAM(S): 150 CAPSULE ORAL at 08:33

## 2024-10-20 RX ADMIN — Medication 1 TABLET(S): at 08:34

## 2024-10-20 RX ADMIN — POLYETHYLENE GLYCOL 3350 17 GRAM(S): 17 POWDER, FOR SOLUTION ORAL at 08:33

## 2024-10-20 RX ADMIN — Medication 325 MILLIGRAM(S): at 08:33

## 2024-10-20 RX ADMIN — PREGABALIN 25 MILLIGRAM(S): 150 CAPSULE ORAL at 21:34

## 2024-10-20 RX ADMIN — PREGABALIN 25 MILLIGRAM(S): 150 CAPSULE ORAL at 12:35

## 2024-10-20 RX ADMIN — PANCRELIPASE 2 CAPSULE(S): 16800; 98400; 56800 CAPSULE, DELAYED RELEASE ORAL at 00:16

## 2024-10-20 NOTE — BH INPATIENT PSYCHIATRY PROGRESS NOTE - NSBHMETABOLIC_PSY_ALL_CORE_FT
BMI: BMI (kg/m2): 16.1 (09-29-24 @ 08:14)  HbA1c:   Glucose:   BP: --Vital Signs Last 24 Hrs  T(C): 36.4 (10-20-24 @ 10:11), Max: 36.4 (10-20-24 @ 10:11)  T(F): 97.6 (10-20-24 @ 10:11), Max: 97.6 (10-20-24 @ 10:11)  HR: --  BP: --  BP(mean): --  RR: --  SpO2: --    Orthostatic VS  10-20-24 @ 10:11  Lying BP: --/-- HR: --  Sitting BP: 101/65 HR: 64  Standing BP: 104/68 HR: 68  Site: upper left arm  Mode: electronic  Orthostatic VS  10-19-24 @ 07:41  Lying BP: --/-- HR: --  Sitting BP: 92/61 HR: 66  Standing BP: 102/69 HR: 67  Site: --  Mode: --    Lipid Panel:

## 2024-10-20 NOTE — BH INPATIENT PSYCHIATRY PROGRESS NOTE - NSBHCHARTREVIEWVS_PSY_A_CORE FT
Vital Signs Last 24 Hrs  T(C): 36.4 (10-20-24 @ 10:11), Max: 36.4 (10-20-24 @ 10:11)  T(F): 97.6 (10-20-24 @ 10:11), Max: 97.6 (10-20-24 @ 10:11)  HR: --  BP: --  BP(mean): --  RR: --  SpO2: --    Orthostatic VS  10-20-24 @ 10:11  Lying BP: --/-- HR: --  Sitting BP: 101/65 HR: 64  Standing BP: 104/68 HR: 68  Site: upper left arm  Mode: electronic  Orthostatic VS  10-19-24 @ 07:41  Lying BP: --/-- HR: --  Sitting BP: 92/61 HR: 66  Standing BP: 102/69 HR: 67  Site: --  Mode: --

## 2024-10-20 NOTE — BH INPATIENT PSYCHIATRY PROGRESS NOTE - NSBHASSESSSUMMFT_PSY_ALL_CORE
Patient is a 67 yo female with PPHx of depression and anxiety with no prior inpatient psychiatric hospitalizations, currently in outpatient treatment with psychiatrist Dr. Puente, and PMH significant for chronic back pain,  irritable bowel syndrome and osteoporosis with hx of compression fractures who presented s/p suicide attempt via overdose on her medication night prior to ED presentation on 9/29.     On arrival to  patient's presentation is consistent with MDD in the context of functional decline and worsening chronic pain over the past year. Pt reports low mood, hopelessness, anhedonia and passive SI. Denies active suicidal ideation, intent or plans now. At this time pt is very dysphoric and focused on being discharged. Agreed to start trial of Cymbalta, which she only took for 2-3 days before stopping it in the past.    10/11 Clinical Update: Continues to endorse low mood, feeling hopeless and unmotivated in the context of chronic pain. Declines to meet with hospitalist today to go over pain management and refuses to increase her dose of Cymbalta, afraid of side effects. Reports pain management today is "adequate". Preoccupied with discharge and attributing her depression to being in the hospital. Minimizing symptoms at home and suicide attempt. Would titrate Cymbalta if pt is amenable.  10/12 Dysphoric irritable, no SI, pain focus. Patient encouraged to give chance for Cymbalta to work to help pain and depressed mood  10/13 Irritable, somatically preoccupied with focus on receiving her Creon, focused on pain and constipation. Encouraged to adhere to Cymbalta and agreed to add daily miralax to regimen for constipation. If develop loose BM, will reduce to PRN.  10/14: Dysphoric, irritable, partially compliant with Cymbalta. Continues to report constipation, will continue standing senna, miralax and prn dulcolax. Switched PRN ativan for PRN valium 5mg twice daily. Pt was on this dose of valium prn in J and reports better results for both anxiety and pain control. Added OsCal supplementation as per patient's request. Full spine MRI without contrast ordered and discussed with MRI supervisor and hospitalist. Will discuss with anesthesia. Pt will have to be NPO after midnight, discussed with nursing.   10/15: Pt is under better spirits today, denies SI/HI and seems future focused. Adamantly refusing MRI now. Patient's affect and decision making seems highly dependent on her perception of pain control at any given time. Thus far any improvement has been highly inconsistent. Pt refusing to take higher doses of Cymbalta for now.   10/16: Pt is more dysphoric today. Presents catastrophic thinking and very limited frustration tolerance. Refusing to go up on her dose of Cymbalta. Continues to refuse MRI.  10/17: Dysphoric, withdrawn, negative and expressing hopelessness. Refusing to go up on Cymbalta. Spoke with  who will meet with pt today and encourage her to consider trying a higher dose of her antidepressant.  10/18: Remains dysphoric, pessimistic and anhedonic. Agreed to increase her dose of Cymbalta to 30mg.   10/19 anxious, dysphoric, no SI/HI, no psychosis. Tolerating cymbalta well.  10/20 improving mood, anxiety, pain. No SI/HI, no psychosis.    -Admit to [facility] 2S  -CO for hospital bed  -Legal status 2PC  -Continue Cymbalta 30mg daily for depression (inc from 20mg on 10/18)  -Continue pain management as per Pain Medicine recs --> Lyrica 25mg PO TID and Oxycodone 2.5mg PO every 4 hrs PRN  -Full spine MRI without contrast, with anesthesia as per patient's request for pain management pending.  -NPO AFTER MIDNIGHT for MRI  -PT consult  -Nutrition consult complete, recs appreciated  -Pt would benefit from meeting with the  when available

## 2024-10-20 NOTE — BH INPATIENT PSYCHIATRY PROGRESS NOTE - NSBHFUPINTERVALHXFT_PSY_A_CORE
Ms. Diaz was seen today for follow up of depression. Chart reviewed and discussed with nursing staff. Remains on CO for hospital bed. She is compliant with medication, no acute events overnight/this am. Pt received PRN oxy codeine last night around 11.30 pm and valum around 2 pm yesterday . On encounter, Pt reports feeling much better. Her sleep was broken and appetite is ok. She feels no back pain this morning. Denies any SI, HI, janey, paranoia or hallucination. Vitals are stable.

## 2024-10-21 PROCEDURE — 99232 SBSQ HOSP IP/OBS MODERATE 35: CPT

## 2024-10-21 RX ORDER — PREGABALIN 150 MG/1
25 CAPSULE ORAL ONCE
Refills: 0 | Status: DISCONTINUED | OUTPATIENT
Start: 2024-10-21 | End: 2024-10-21

## 2024-10-21 RX ORDER — PREGABALIN 150 MG/1
25 CAPSULE ORAL THREE TIMES A DAY
Refills: 0 | Status: DISCONTINUED | OUTPATIENT
Start: 2024-10-21 | End: 2024-10-25

## 2024-10-21 RX ORDER — OXYCODONE HYDROCHLORIDE 30 MG/1
2.5 TABLET ORAL EVERY 6 HOURS
Refills: 0 | Status: DISCONTINUED | OUTPATIENT
Start: 2024-10-21 | End: 2024-10-25

## 2024-10-21 RX ORDER — MELATONIN 5 MG
5 TABLET ORAL
Refills: 0 | Status: DISCONTINUED | OUTPATIENT
Start: 2024-10-21 | End: 2024-10-23

## 2024-10-21 RX ORDER — DIAZEPAM 10 MG/1
5 FILM BUCCAL
Refills: 0 | Status: DISCONTINUED | OUTPATIENT
Start: 2024-10-21 | End: 2024-10-22

## 2024-10-21 RX ADMIN — PANCRELIPASE 1 CAPSULE(S): 16800; 98400; 56800 CAPSULE, DELAYED RELEASE ORAL at 16:27

## 2024-10-21 RX ADMIN — DIAZEPAM 5 MILLIGRAM(S): 10 FILM BUCCAL at 16:18

## 2024-10-21 RX ADMIN — DULOXETINE HYDROCHLORIDE 30 MILLIGRAM(S): 30 CAPSULE, DELAYED RELEASE ORAL at 08:37

## 2024-10-21 RX ADMIN — Medication 2 TABLET(S): at 21:32

## 2024-10-21 RX ADMIN — PANCRELIPASE 1 CAPSULE(S): 16800; 98400; 56800 CAPSULE, DELAYED RELEASE ORAL at 08:37

## 2024-10-21 RX ADMIN — Medication 1000 UNIT(S): at 08:38

## 2024-10-21 RX ADMIN — PREGABALIN 25 MILLIGRAM(S): 150 CAPSULE ORAL at 08:38

## 2024-10-21 RX ADMIN — OXYCODONE HYDROCHLORIDE 2.5 MILLIGRAM(S): 30 TABLET ORAL at 22:44

## 2024-10-21 RX ADMIN — OXYCODONE HYDROCHLORIDE 2.5 MILLIGRAM(S): 30 TABLET ORAL at 10:48

## 2024-10-21 RX ADMIN — OXYCODONE HYDROCHLORIDE 2.5 MILLIGRAM(S): 30 TABLET ORAL at 23:40

## 2024-10-21 RX ADMIN — PANCRELIPASE 2 CAPSULE(S): 16800; 98400; 56800 CAPSULE, DELAYED RELEASE ORAL at 21:38

## 2024-10-21 RX ADMIN — PREGABALIN 25 MILLIGRAM(S): 150 CAPSULE ORAL at 14:00

## 2024-10-21 RX ADMIN — Medication 1 TABLET(S): at 08:37

## 2024-10-21 RX ADMIN — PANCRELIPASE 1 CAPSULE(S): 16800; 98400; 56800 CAPSULE, DELAYED RELEASE ORAL at 13:27

## 2024-10-21 RX ADMIN — Medication 5 MILLIGRAM(S): at 19:04

## 2024-10-21 RX ADMIN — PREGABALIN 25 MILLIGRAM(S): 150 CAPSULE ORAL at 21:33

## 2024-10-21 NOTE — BH INPATIENT PSYCHIATRY PROGRESS NOTE - NSBHCHARTREVIEWVS_PSY_A_CORE FT
Vital Signs Last 24 Hrs  T(C): 36.3 (10-21-24 @ 07:41), Max: 36.3 (10-21-24 @ 07:41)  T(F): 97.3 (10-21-24 @ 07:41), Max: 97.3 (10-21-24 @ 07:41)  HR: 67 (10-21-24 @ 07:41) (67 - 67)  BP: 103/67 (10-21-24 @ 07:41) (103/67 - 103/67)  BP(mean): --  RR: --  SpO2: --    Orthostatic VS  10-20-24 @ 10:11  Lying BP: --/-- HR: --  Sitting BP: 101/65 HR: 64  Standing BP: 104/68 HR: 68  Site: upper left arm  Mode: electronic

## 2024-10-21 NOTE — BH INPATIENT PSYCHIATRY PROGRESS NOTE - NSBHASSESSSUMMFT_PSY_ALL_CORE
Patient is a 67 yo female with PPHx of depression and anxiety with no prior inpatient psychiatric hospitalizations, currently in outpatient treatment with psychiatrist Dr. Puente, and PMH significant for chronic back pain,  irritable bowel syndrome and osteoporosis with hx of compression fractures who presented s/p suicide attempt via overdose on her medication night prior to ED presentation on 9/29.     On arrival to  patient's presentation is consistent with MDD in the context of functional decline and worsening chronic pain over the past year. Pt reports low mood, hopelessness, anhedonia and passive SI. Denies active suicidal ideation, intent or plans now. At this time pt is very dysphoric and focused on being discharged. Agreed to start trial of Cymbalta, which she only took for 2-3 days before stopping it in the past.    10/11 Clinical Update: Continues to endorse low mood, feeling hopeless and unmotivated in the context of chronic pain. Declines to meet with hospitalist today to go over pain management and refuses to increase her dose of Cymbalta, afraid of side effects. Reports pain management today is "adequate". Preoccupied with discharge and attributing her depression to being in the hospital. Minimizing symptoms at home and suicide attempt. Would titrate Cymbalta if pt is amenable.  10/12 Dysphoric irritable, no SI, pain focus. Patient encouraged to give chance for Cymbalta to work to help pain and depressed mood  10/13 Irritable, somatically preoccupied with focus on receiving her Creon, focused on pain and constipation. Encouraged to adhere to Cymbalta and agreed to add daily miralax to regimen for constipation. If develop loose BM, will reduce to PRN.  10/14: Dysphoric, irritable, partially compliant with Cymbalta. Continues to report constipation, will continue standing senna, miralax and prn dulcolax. Switched PRN ativan for PRN valium 5mg twice daily. Pt was on this dose of valium prn in J and reports better results for both anxiety and pain control. Added OsCal supplementation as per patient's request. Full spine MRI without contrast ordered and discussed with MRI supervisor and hospitalist. Will discuss with anesthesia. Pt will have to be NPO after midnight, discussed with nursing.   10/15: Pt is under better spirits today, denies SI/HI and seems future focused. Adamantly refusing MRI now. Patient's affect and decision making seems highly dependent on her perception of pain control at any given time. Thus far any improvement has been highly inconsistent. Pt refusing to take higher doses of Cymbalta for now.   10/16: Pt is more dysphoric today. Presents catastrophic thinking and very limited frustration tolerance. Refusing to go up on her dose of Cymbalta. Continues to refuse MRI.  10/17: Dysphoric, withdrawn, negative and expressing hopelessness. Refusing to go up on Cymbalta. Spoke with  who will meet with pt today and encourage her to consider trying a higher dose of her antidepressant.  10/18: Remains dysphoric, pessimistic and anhedonic. Agreed to increase her dose of Cymbalta to 30mg.   10/19 anxious, dysphoric, no SI/HI, no psychosis. Tolerating cymbalta well.  10/20 improving mood, anxiety, pain. No SI/HI, no psychosis.  10/21: Dysphoric and pessimistic again this morning. Requesting more sedating medications.    -Admit to [facility] 2S  -CO for hospital bed  -Legal status 2PC  -Continue Cymbalta 30mg daily for depression (inc from 20mg on 10/18)  -Continue pain management as per Pain Medicine recs --> Lyrica 25mg PO TID and Oxycodone 2.5mg PO every 4 hrs PRN  -Full spine MRI without contrast, with anesthesia as per patient's request for pain management pending.  -NPO AFTER MIDNIGHT for MRI  -PT consult  -Nutrition consult complete, recs appreciated  -Pt would benefit from meeting with the  when available

## 2024-10-21 NOTE — BH INPATIENT PSYCHIATRY PROGRESS NOTE - NSBHFUPINTERVALHXFT_PSY_A_CORE
Ms. Diaz was seen today for follow up of depression. Chart reviewed and discussed with nursing staff. Remains on CO for hospital bed. Compliant with her medications. Requested PRN Valium last night at 9:34pm and Oxycodone 2.5mg this morning at 10:48am. On encounter today pt presents constricted affect and reports feeling "terrible" because of her pain and the fact that she needed to wait for her PRN Valium last night. States that the fact she had to wait for her PRN affected her mood a lot. Reports feeling unable to utilize any of her coping skills for distress tolerance. Reports poor sleep and is now requesting Lunesta. Provided psychoeducation and advised against addition of Lunesta when pt is already taking Valium and Oxycodone. Advised against use a multiple highly sedating medications at the same time. Discussed case with  and provided treatment updates.

## 2024-10-21 NOTE — BH INPATIENT PSYCHIATRY PROGRESS NOTE - NSBHMETABOLIC_PSY_ALL_CORE_FT
BMI: BMI (kg/m2): 16.1 (09-29-24 @ 08:14)  HbA1c:   Glucose:   BP: 103/67 (10-21-24 @ 07:41) (103/67 - 103/67)Vital Signs Last 24 Hrs  T(C): 36.3 (10-21-24 @ 07:41), Max: 36.3 (10-21-24 @ 07:41)  T(F): 97.3 (10-21-24 @ 07:41), Max: 97.3 (10-21-24 @ 07:41)  HR: 67 (10-21-24 @ 07:41) (67 - 67)  BP: 103/67 (10-21-24 @ 07:41) (103/67 - 103/67)  BP(mean): --  RR: --  SpO2: --    Orthostatic VS  10-20-24 @ 10:11  Lying BP: --/-- HR: --  Sitting BP: 101/65 HR: 64  Standing BP: 104/68 HR: 68  Site: upper left arm  Mode: electronic    Lipid Panel:

## 2024-10-22 PROCEDURE — 99232 SBSQ HOSP IP/OBS MODERATE 35: CPT

## 2024-10-22 RX ORDER — DIAZEPAM 10 MG/1
5 FILM BUCCAL EVERY 12 HOURS
Refills: 0 | Status: DISCONTINUED | OUTPATIENT
Start: 2024-10-22 | End: 2024-10-25

## 2024-10-22 RX ADMIN — PREGABALIN 25 MILLIGRAM(S): 150 CAPSULE ORAL at 20:22

## 2024-10-22 RX ADMIN — Medication 5 MILLIGRAM(S): at 18:19

## 2024-10-22 RX ADMIN — PANCRELIPASE 2 CAPSULE(S): 16800; 98400; 56800 CAPSULE, DELAYED RELEASE ORAL at 21:38

## 2024-10-22 RX ADMIN — OXYCODONE HYDROCHLORIDE 2.5 MILLIGRAM(S): 30 TABLET ORAL at 23:42

## 2024-10-22 RX ADMIN — Medication 1 TABLET(S): at 08:20

## 2024-10-22 RX ADMIN — PREGABALIN 25 MILLIGRAM(S): 150 CAPSULE ORAL at 08:19

## 2024-10-22 RX ADMIN — PREGABALIN 25 MILLIGRAM(S): 150 CAPSULE ORAL at 12:33

## 2024-10-22 RX ADMIN — PANCRELIPASE 1 CAPSULE(S): 16800; 98400; 56800 CAPSULE, DELAYED RELEASE ORAL at 16:46

## 2024-10-22 RX ADMIN — POLYETHYLENE GLYCOL 3350 17 GRAM(S): 17 POWDER, FOR SOLUTION ORAL at 08:20

## 2024-10-22 RX ADMIN — PANCRELIPASE 2 CAPSULE(S): 16800; 98400; 56800 CAPSULE, DELAYED RELEASE ORAL at 18:20

## 2024-10-22 RX ADMIN — Medication 1000 UNIT(S): at 08:20

## 2024-10-22 RX ADMIN — PANCRELIPASE 1 CAPSULE(S): 16800; 98400; 56800 CAPSULE, DELAYED RELEASE ORAL at 08:19

## 2024-10-22 RX ADMIN — PANCRELIPASE 1 CAPSULE(S): 16800; 98400; 56800 CAPSULE, DELAYED RELEASE ORAL at 12:33

## 2024-10-22 RX ADMIN — Medication 2 TABLET(S): at 20:22

## 2024-10-22 RX ADMIN — DULOXETINE HYDROCHLORIDE 30 MILLIGRAM(S): 30 CAPSULE, DELAYED RELEASE ORAL at 08:21

## 2024-10-22 NOTE — BH INPATIENT PSYCHIATRY PROGRESS NOTE - NSBHASSESSSUMMFT_PSY_ALL_CORE
Patient is a 67 yo female with PPHx of depression and anxiety with no prior inpatient psychiatric hospitalizations, currently in outpatient treatment with psychiatrist Dr. Puente, and PMH significant for chronic back pain,  irritable bowel syndrome and osteoporosis with hx of compression fractures who presented s/p suicide attempt via overdose on her medication night prior to ED presentation on 9/29.     On arrival to  patient's presentation is consistent with MDD in the context of functional decline and worsening chronic pain over the past year. Pt reports low mood, hopelessness, anhedonia and passive SI. Denies active suicidal ideation, intent or plans now. At this time pt is very dysphoric and focused on being discharged. Agreed to start trial of Cymbalta, which she only took for 2-3 days before stopping it in the past.    10/11 Clinical Update: Continues to endorse low mood, feeling hopeless and unmotivated in the context of chronic pain. Declines to meet with hospitalist today to go over pain management and refuses to increase her dose of Cymbalta, afraid of side effects. Reports pain management today is "adequate". Preoccupied with discharge and attributing her depression to being in the hospital. Minimizing symptoms at home and suicide attempt. Would titrate Cymbalta if pt is amenable.  10/12 Dysphoric irritable, no SI, pain focus. Patient encouraged to give chance for Cymbalta to work to help pain and depressed mood  10/13 Irritable, somatically preoccupied with focus on receiving her Creon, focused on pain and constipation. Encouraged to adhere to Cymbalta and agreed to add daily miralax to regimen for constipation. If develop loose BM, will reduce to PRN.  10/14: Dysphoric, irritable, partially compliant with Cymbalta. Continues to report constipation, will continue standing senna, miralax and prn dulcolax. Switched PRN ativan for PRN valium 5mg twice daily. Pt was on this dose of valium prn in J and reports better results for both anxiety and pain control. Added OsCal supplementation as per patient's request. Full spine MRI without contrast ordered and discussed with MRI supervisor and hospitalist. Will discuss with anesthesia. Pt will have to be NPO after midnight, discussed with nursing.   10/15: Pt is under better spirits today, denies SI/HI and seems future focused. Adamantly refusing MRI now. Patient's affect and decision making seems highly dependent on her perception of pain control at any given time. Thus far any improvement has been highly inconsistent. Pt refusing to take higher doses of Cymbalta for now.   10/16: Pt is more dysphoric today. Presents catastrophic thinking and very limited frustration tolerance. Refusing to go up on her dose of Cymbalta. Continues to refuse MRI.  10/17: Dysphoric, withdrawn, negative and expressing hopelessness. Refusing to go up on Cymbalta. Spoke with  who will meet with pt today and encourage her to consider trying a higher dose of her antidepressant.  10/18: Remains dysphoric, pessimistic and anhedonic. Agreed to increase her dose of Cymbalta to 30mg.   10/19 anxious, dysphoric, no SI/HI, no psychosis. Tolerating cymbalta well.  10/20 improving mood, anxiety, pain. No SI/HI, no psychosis.  10/21: Dysphoric and pessimistic again this morning. Requesting more sedating medications.    -Admit to [place] 2S  -CO for hospital bed  -Legal status 2PC  -Continue Cymbalta 30mg daily for depression (inc from 20mg on 10/18)  -Continue pain management as per Pain Medicine recs --> Lyrica 25mg PO TID and Oxycodone 2.5mg PO every 4 hrs PRN  -Full spine MRI without contrast, with anesthesia as per patient's request for pain management pending.  -NPO AFTER MIDNIGHT for MRI  -PT consult  -Nutrition consult complete, recs appreciated  -Pt would benefit from meeting with the  when available Patient is a 69 yo female with PPHx of depression and anxiety with no prior inpatient psychiatric hospitalizations, currently in outpatient treatment with psychiatrist Dr. Puente, and PMH significant for chronic back pain,  irritable bowel syndrome and osteoporosis with hx of compression fractures who presented s/p suicide attempt via overdose on her medication night prior to ED presentation on 9/29.     On arrival to  patient's presentation is consistent with MDD in the context of functional decline and worsening chronic pain over the past year. Pt reports low mood, hopelessness, anhedonia and passive SI. Denies active suicidal ideation, intent or plans now. At this time pt is very dysphoric and focused on being discharged. Agreed to start trial of Cymbalta, which she only took for 2-3 days before stopping it in the past.    10/11 Clinical Update: Continues to endorse low mood, feeling hopeless and unmotivated in the context of chronic pain. Declines to meet with hospitalist today to go over pain management and refuses to increase her dose of Cymbalta, afraid of side effects. Reports pain management today is "adequate". Preoccupied with discharge and attributing her depression to being in the hospital. Minimizing symptoms at home and suicide attempt. Would titrate Cymbalta if pt is amenable.  10/12 Dysphoric irritable, no SI, pain focus. Patient encouraged to give chance for Cymbalta to work to help pain and depressed mood  10/13 Irritable, somatically preoccupied with focus on receiving her Creon, focused on pain and constipation. Encouraged to adhere to Cymbalta and agreed to add daily miralax to regimen for constipation. If develop loose BM, will reduce to PRN.  10/14: Dysphoric, irritable, partially compliant with Cymbalta. Continues to report constipation, will continue standing senna, miralax and prn dulcolax. Switched PRN ativan for PRN valium 5mg twice daily. Pt was on this dose of valium prn in J and reports better results for both anxiety and pain control. Added OsCal supplementation as per patient's request. Full spine MRI without contrast ordered and discussed with MRI supervisor and hospitalist. Will discuss with anesthesia. Pt will have to be NPO after midnight, discussed with nursing.   10/15: Pt is under better spirits today, denies SI/HI and seems future focused. Adamantly refusing MRI now. Patient's affect and decision making seems highly dependent on her perception of pain control at any given time. Thus far any improvement has been highly inconsistent. Pt refusing to take higher doses of Cymbalta for now.   10/16: Pt is more dysphoric today. Presents catastrophic thinking and very limited frustration tolerance. Refusing to go up on her dose of Cymbalta. Continues to refuse MRI.  10/17: Dysphoric, withdrawn, negative and expressing hopelessness. Refusing to go up on Cymbalta. Spoke with  who will meet with pt today and encourage her to consider trying a higher dose of her antidepressant.  10/18: Remains dysphoric, pessimistic and anhedonic. Agreed to increase her dose of Cymbalta to 30mg.   10/19 anxious, dysphoric, no SI/HI, no psychosis. Tolerating cymbalta well.  10/20 improving mood, anxiety, pain. No SI/HI, no psychosis.  10/21: Dysphoric and pessimistic again this morning. Requesting more sedating medications.  10/22: Pt presents brighter affect this morning. Denies SI/HI.  agrees pt seems to be having more "good days". Possible discharge by the end of this week once home care is reinstated.     -Admit to [facility] 2S  -CO for hospital bed  -Legal status 2PC  -Continue Cymbalta 30mg daily for depression (inc from 20mg on 10/18)  -Continue pain management as per Pain Medicine recs --> Lyrica 25mg PO TID and Oxycodone 2.5mg PO every 4 hrs PRN  -Full spine MRI without contrast, with anesthesia as per patient's request for pain management pending.  -NPO AFTER MIDNIGHT for MRI  -PT consult  -Nutrition consult complete, recs appreciated  -Pt would benefit from meeting with the  when available

## 2024-10-22 NOTE — BH INPATIENT PSYCHIATRY PROGRESS NOTE - NSBHMETABOLIC_PSY_ALL_CORE_FT
BMI: BMI (kg/m2): 16.1 (09-29-24 @ 08:14)  HbA1c:   Glucose:   BP: 103/67 (10-21-24 @ 07:41) (103/67 - 103/67)Vital Signs Last 24 Hrs  T(C): --  T(F): --  HR: --  BP: --  BP(mean): --  RR: --  SpO2: --    Orthostatic VS  10-20-24 @ 10:11  Lying BP: --/-- HR: --  Sitting BP: 101/65 HR: 64  Standing BP: 104/68 HR: 68  Site: upper left arm  Mode: electronic    Lipid Panel:  BMI: BMI (kg/m2): 16.1 (09-29-24 @ 08:14)  HbA1c:   Glucose:   BP: 103/67 (10-21-24 @ 07:41) (103/67 - 103/67)Vital Signs Last 24 Hrs  T(C): 36.3 (10-22-24 @ 09:54), Max: 36.3 (10-22-24 @ 09:54)  T(F): 97.4 (10-22-24 @ 09:54), Max: 97.4 (10-22-24 @ 09:54)  HR: --  BP: --  BP(mean): --  RR: --  SpO2: --    Orthostatic VS  10-22-24 @ 09:54  Lying BP: --/-- HR: --  Sitting BP: 98/61 HR: 64  Standing BP: 97/68 HR: 77  Site: --  Mode: --    Lipid Panel:

## 2024-10-22 NOTE — BH DISCHARGE NOTE NURSING/SOCIAL WORK/PSYCH REHAB - PATIENT PORTAL LINK FT
You can access the FollowMyHealth Patient Portal offered by [facility] by registering at the following website: [email or web address]. By joining [facility]’s FollowMyHealth portal, you will also be able to view your health information using other applications (apps) compatible with our system.

## 2024-10-22 NOTE — BH INPATIENT PSYCHIATRY PROGRESS NOTE - NSBHFUPINTERVALHXFT_PSY_A_CORE
Ms. Diaz was seen today for follow up of depression. Chart reviewed and discussed with nursing staff. Remains on CO for hospital bed. Compliant with her medications. Requested PRN Valium yesterday around 4pm and Oxycodone 2.5mg last night at 10:44pm.     On encounter today pt presents  Ms. Diaz was seen today for follow up of depression. Chart reviewed and discussed with nursing staff. Remains on CO for hospital bed. Compliant with her medications. Requested PRN Valium yesterday around 4pm and Oxycodone 2.5mg last night at 10:44pm.     During rounds this morning pt was seen ambulating with her rolling walker with 1 staff assist. Her affect was brighter today. During encounter a little later pt seemed more constricted. Reported she was having a good morning until she had a disagreement with her CO aide regarding pillow positioning. Helped pt into a more comfortable position. Encouraged pt to use her coping skills to manage emotional distress. Discussed relationship between distorted thinking and depression and assisted pt in labeling some of her own catastrophic and black/white thinking. Pt agrees that oftentimes she allows a single negative event ruin her day. Pt was able to pivot after this and verbalized positive things that have happened to her lately such as not experiencing the "burning" back pain for some time now, being able to walk and feeling her mood has improved some.

## 2024-10-22 NOTE — BH INPATIENT PSYCHIATRY PROGRESS NOTE - NSBHCHARTREVIEWVS_PSY_A_CORE FT
Vital Signs Last 24 Hrs  T(C): --  T(F): --  HR: --  BP: --  BP(mean): --  RR: --  SpO2: --    Orthostatic VS  10-20-24 @ 10:11  Lying BP: --/-- HR: --  Sitting BP: 101/65 HR: 64  Standing BP: 104/68 HR: 68  Site: upper left arm  Mode: electronic   Vital Signs Last 24 Hrs  T(C): 36.3 (10-22-24 @ 09:54), Max: 36.3 (10-22-24 @ 09:54)  T(F): 97.4 (10-22-24 @ 09:54), Max: 97.4 (10-22-24 @ 09:54)  HR: --  BP: --  BP(mean): --  RR: --  SpO2: --    Orthostatic VS  10-22-24 @ 09:54  Lying BP: --/-- HR: --  Sitting BP: 98/61 HR: 64  Standing BP: 97/68 HR: 77  Site: --  Mode: --

## 2024-10-22 NOTE — BH DISCHARGE NOTE NURSING/SOCIAL WORK/PSYCH REHAB - NSDCPRRECOMMEND_PSY_ALL_CORE
Patient is scheduled to be discharged today and will return to her home where she lives with her . Writer encouraged patient to maintain medication compliance, to utilize her coping skills and to participate in social activities. Patient may benefit from occupational and physical therapy to facilitate daily functioning.

## 2024-10-22 NOTE — BH DISCHARGE NOTE NURSING/SOCIAL WORK/PSYCH REHAB - NSCDUDCCRISIS_PSY_A_CORE
.  [facility]  [phone] [place] [id number] [phone]   Hours:  Monday through Friday from 9 AM to 3 PM

## 2024-10-22 NOTE — BH DISCHARGE NOTE NURSING/SOCIAL WORK/PSYCH REHAB - NSDCPRGOAL_PSY_ALL_CORE
Patient initially presented with symptoms of depression, passive suicide ideations and decreased daily functioning. Patient's initial goals included increasing patient's hope in recovery, increasing her coping skills and increasing daily functioning. Patient made some gains towards her rehab goals as evidenced by patient's brighter affect, utilization of coping skills, increased daily functioning and denial of suicidal ideations. However, patient did not participate in any of the group activities.     ***Patient completed a safety plan.

## 2024-10-22 NOTE — BH DISCHARGE NOTE NURSING/SOCIAL WORK/PSYCH REHAB - NSBHDCADDR2FT_A_CORE
[address] [facility], [address], [place] [address] [address] [facility], [address], [place] [address]

## 2024-10-23 PROCEDURE — 99232 SBSQ HOSP IP/OBS MODERATE 35: CPT

## 2024-10-23 RX ORDER — PREGABALIN 150 MG/1
1 CAPSULE ORAL
Qty: 90 | Refills: 0
Start: 2024-10-23 | End: 2024-11-21

## 2024-10-23 RX ORDER — DIAZEPAM 10 MG/1
1 FILM BUCCAL
Qty: 14 | Refills: 0
Start: 2024-10-23 | End: 2024-10-29

## 2024-10-23 RX ORDER — SENNA 187 MG
2 TABLET ORAL
Qty: 60 | Refills: 0
Start: 2024-10-23 | End: 2024-11-21

## 2024-10-23 RX ORDER — FERROUS SULFATE 325(65) MG
1 TABLET ORAL
Qty: 30 | Refills: 0
Start: 2024-10-23 | End: 2024-11-21

## 2024-10-23 RX ORDER — MELATONIN 5 MG
5 TABLET ORAL
Refills: 0 | Status: DISCONTINUED | OUTPATIENT
Start: 2024-10-23 | End: 2024-10-25

## 2024-10-23 RX ORDER — DULOXETINE HYDROCHLORIDE 30 MG/1
1 CAPSULE, DELAYED RELEASE ORAL
Qty: 30 | Refills: 0
Start: 2024-10-23 | End: 2024-11-21

## 2024-10-23 RX ORDER — PANCRELIPASE 16800; 98400; 56800 [USP'U]/1; [USP'U]/1; [USP'U]/1
1 CAPSULE, DELAYED RELEASE ORAL
Qty: 90 | Refills: 0
Start: 2024-10-23 | End: 2024-11-21

## 2024-10-23 RX ORDER — PANCRELIPASE 16800; 98400; 56800 [USP'U]/1; [USP'U]/1; [USP'U]/1
1 CAPSULE, DELAYED RELEASE ORAL
Qty: 28 | Refills: 0
Start: 2024-10-23 | End: 2024-11-05

## 2024-10-23 RX ORDER — CHOLESTEROL/SOYBEAN OIL/C/E 60-200-80
1 POWDER (GRAM) ORAL
Qty: 0 | Refills: 0 | DISCHARGE
Start: 2024-10-23

## 2024-10-23 RX ADMIN — Medication 1000 UNIT(S): at 08:52

## 2024-10-23 RX ADMIN — DIAZEPAM 5 MILLIGRAM(S): 10 FILM BUCCAL at 00:43

## 2024-10-23 RX ADMIN — Medication 1 TABLET(S): at 08:51

## 2024-10-23 RX ADMIN — PANCRELIPASE 1 CAPSULE(S): 16800; 98400; 56800 CAPSULE, DELAYED RELEASE ORAL at 08:50

## 2024-10-23 RX ADMIN — POLYETHYLENE GLYCOL 3350 17 GRAM(S): 17 POWDER, FOR SOLUTION ORAL at 08:51

## 2024-10-23 RX ADMIN — DULOXETINE HYDROCHLORIDE 30 MILLIGRAM(S): 30 CAPSULE, DELAYED RELEASE ORAL at 08:51

## 2024-10-23 RX ADMIN — PREGABALIN 25 MILLIGRAM(S): 150 CAPSULE ORAL at 12:23

## 2024-10-23 RX ADMIN — PREGABALIN 25 MILLIGRAM(S): 150 CAPSULE ORAL at 20:35

## 2024-10-23 RX ADMIN — PREGABALIN 25 MILLIGRAM(S): 150 CAPSULE ORAL at 08:52

## 2024-10-23 RX ADMIN — DIAZEPAM 5 MILLIGRAM(S): 10 FILM BUCCAL at 14:34

## 2024-10-23 RX ADMIN — PANCRELIPASE 2 CAPSULE(S): 16800; 98400; 56800 CAPSULE, DELAYED RELEASE ORAL at 20:40

## 2024-10-23 RX ADMIN — PANCRELIPASE 1 CAPSULE(S): 16800; 98400; 56800 CAPSULE, DELAYED RELEASE ORAL at 11:46

## 2024-10-23 RX ADMIN — Medication 2 TABLET(S): at 20:35

## 2024-10-23 RX ADMIN — PANCRELIPASE 1 CAPSULE(S): 16800; 98400; 56800 CAPSULE, DELAYED RELEASE ORAL at 16:41

## 2024-10-23 RX ADMIN — Medication 5 MILLIGRAM(S): at 20:40

## 2024-10-23 NOTE — BH INPATIENT PSYCHIATRY PROGRESS NOTE - NSBHMETABOLIC_PSY_ALL_CORE_FT
BMI: BMI (kg/m2): 16.1 (09-29-24 @ 08:14)  HbA1c:   Glucose:   BP: 103/67 (10-21-24 @ 07:41) (103/67 - 103/67)Vital Signs Last 24 Hrs  T(C): 36.4 (10-23-24 @ 07:47), Max: 36.4 (10-23-24 @ 07:47)  T(F): 97.6 (10-23-24 @ 07:47), Max: 97.6 (10-23-24 @ 07:47)  HR: --  BP: --  BP(mean): --  RR: --  SpO2: --    Orthostatic VS  10-23-24 @ 07:47  Lying BP: 103/68 HR: 66  Sitting BP: 107/66 HR: 67  Standing BP: --/-- HR: --  Site: --  Mode: --  Orthostatic VS  10-22-24 @ 09:54  Lying BP: --/-- HR: --  Sitting BP: 98/61 HR: 64  Standing BP: 97/68 HR: 77  Site: --  Mode: --    Lipid Panel:

## 2024-10-23 NOTE — BH INPATIENT PSYCHIATRY PROGRESS NOTE - NSBHCHARTREVIEWVS_PSY_A_CORE FT
Vital Signs Last 24 Hrs  T(C): 36.4 (10-23-24 @ 07:47), Max: 36.4 (10-23-24 @ 07:47)  T(F): 97.6 (10-23-24 @ 07:47), Max: 97.6 (10-23-24 @ 07:47)  HR: --  BP: --  BP(mean): --  RR: --  SpO2: --    Orthostatic VS  10-23-24 @ 07:47  Lying BP: 103/68 HR: 66  Sitting BP: 107/66 HR: 67  Standing BP: --/-- HR: --  Site: --  Mode: --  Orthostatic VS  10-22-24 @ 09:54  Lying BP: --/-- HR: --  Sitting BP: 98/61 HR: 64  Standing BP: 97/68 HR: 77  Site: --  Mode: --

## 2024-10-23 NOTE — BH INPATIENT PSYCHIATRY PROGRESS NOTE - NSBHFUPINTERVALHXFT_PSY_A_CORE
Ms. Diaz was seen today for follow up of depression. Chart reviewed and discussed with nursing staff. Remains on CO for hospital bed. Compliant with her medications. Requested PRN Valium overnight at 12:43 and Oxycodone 2.5mg last night at 11:43pm.     During rounds this morning pt was seen at bedside. Affect is overall more reactive and brighter. Pt reports gains in terms of mood and denies suicidal ideation. Denies side effects from her medications. Pt presenting a more positive outlook on life and is more future oriented. Discussed discharge planning for this Friday. Homecare is set up to start Friday at noon. Pt verbalized understanding.

## 2024-10-23 NOTE — BH INPATIENT PSYCHIATRY PROGRESS NOTE - NSBHASSESSSUMMFT_PSY_ALL_CORE
Patient is a 67 yo female with PPHx of depression and anxiety with no prior inpatient psychiatric hospitalizations, currently in outpatient treatment with psychiatrist Dr. Puente, and PMH significant for chronic back pain,  irritable bowel syndrome and osteoporosis with hx of compression fractures who presented s/p suicide attempt via overdose on her medication night prior to ED presentation on 9/29.     On arrival to  patient's presentation is consistent with MDD in the context of functional decline and worsening chronic pain over the past year. Pt reports low mood, hopelessness, anhedonia and passive SI. Denies active suicidal ideation, intent or plans now. At this time pt is very dysphoric and focused on being discharged. Agreed to start trial of Cymbalta, which she only took for 2-3 days before stopping it in the past.    10/11 Clinical Update: Continues to endorse low mood, feeling hopeless and unmotivated in the context of chronic pain. Declines to meet with hospitalist today to go over pain management and refuses to increase her dose of Cymbalta, afraid of side effects. Reports pain management today is "adequate". Preoccupied with discharge and attributing her depression to being in the hospital. Minimizing symptoms at home and suicide attempt. Would titrate Cymbalta if pt is amenable.  10/12 Dysphoric irritable, no SI, pain focus. Patient encouraged to give chance for Cymbalta to work to help pain and depressed mood  10/13 Irritable, somatically preoccupied with focus on receiving her Creon, focused on pain and constipation. Encouraged to adhere to Cymbalta and agreed to add daily miralax to regimen for constipation. If develop loose BM, will reduce to PRN.  10/14: Dysphoric, irritable, partially compliant with Cymbalta. Continues to report constipation, will continue standing senna, miralax and prn dulcolax. Switched PRN ativan for PRN valium 5mg twice daily. Pt was on this dose of valium prn in J and reports better results for both anxiety and pain control. Added OsCal supplementation as per patient's request. Full spine MRI without contrast ordered and discussed with MRI supervisor and hospitalist. Will discuss with anesthesia. Pt will have to be NPO after midnight, discussed with nursing.   10/15: Pt is under better spirits today, denies SI/HI and seems future focused. Adamantly refusing MRI now. Patient's affect and decision making seems highly dependent on her perception of pain control at any given time. Thus far any improvement has been highly inconsistent. Pt refusing to take higher doses of Cymbalta for now.   10/16: Pt is more dysphoric today. Presents catastrophic thinking and very limited frustration tolerance. Refusing to go up on her dose of Cymbalta. Continues to refuse MRI.  10/17: Dysphoric, withdrawn, negative and expressing hopelessness. Refusing to go up on Cymbalta. Spoke with  who will meet with pt today and encourage her to consider trying a higher dose of her antidepressant.  10/18: Remains dysphoric, pessimistic and anhedonic. Agreed to increase her dose of Cymbalta to 30mg.   10/19 anxious, dysphoric, no SI/HI, no psychosis. Tolerating cymbalta well.  10/20 improving mood, anxiety, pain. No SI/HI, no psychosis.  10/21: Dysphoric and pessimistic again this morning. Requesting more sedating medications.  10/22: Pt presents brighter affect this morning. Denies SI/HI.  agrees pt seems to be having more "good days". Possible discharge by the end of this week once home care is reinstated.   10/23: Affect is overall more reactive and brighter. Pt reports gains in terms of mood and denies suicidal ideation. Denies side effects from her medications. Pt presenting a more positive outlook on life and is more future oriented. Discussed discharge planning for this Friday. Homecare is set up to start Friday at noon. Pt verbalized understanding.     -Admit to [facility]  -CO for hospital bed  -Legal status 2PC  -Continue Cymbalta 30mg daily for depression (inc from 20mg on 10/18)  -Continue pain management as per Pain Medicine recs --> Lyrica 25mg PO TID and Oxycodone 2.5mg PO every 4 hrs PRN  -Full spine MRI without contrast, with anesthesia as per patient's request for pain management pending.  -NPO AFTER MIDNIGHT for MRI  -PT consult  -Nutrition consult complete, recs appreciated  -Pt would benefit from meeting with the  when available

## 2024-10-24 PROCEDURE — 99232 SBSQ HOSP IP/OBS MODERATE 35: CPT | Mod: GC

## 2024-10-24 RX ORDER — OXYCODONE HYDROCHLORIDE 30 MG/1
2.5 TABLET ORAL
Qty: 100 | Refills: 0
Start: 2024-10-24 | End: 2024-11-02

## 2024-10-24 RX ORDER — OXYCODONE HYDROCHLORIDE 30 MG/1
0.5 TABLET ORAL
Qty: 30 | Refills: 0
Start: 2024-10-24

## 2024-10-24 RX ORDER — POLYETHYLENE GLYCOL 3350 17 G/17G
17 POWDER, FOR SOLUTION ORAL
Qty: 510 | Refills: 0
Start: 2024-10-24 | End: 2024-11-22

## 2024-10-24 RX ORDER — PANCRELIPASE 16800; 98400; 56800 [USP'U]/1; [USP'U]/1; [USP'U]/1
1 CAPSULE, DELAYED RELEASE ORAL
Qty: 150 | Refills: 0
Start: 2024-10-24 | End: 2024-11-22

## 2024-10-24 RX ADMIN — PANCRELIPASE 1 CAPSULE(S): 16800; 98400; 56800 CAPSULE, DELAYED RELEASE ORAL at 11:53

## 2024-10-24 RX ADMIN — PREGABALIN 25 MILLIGRAM(S): 150 CAPSULE ORAL at 19:58

## 2024-10-24 RX ADMIN — Medication 5 MILLIGRAM(S): at 20:44

## 2024-10-24 RX ADMIN — PANCRELIPASE 1 CAPSULE(S): 16800; 98400; 56800 CAPSULE, DELAYED RELEASE ORAL at 08:19

## 2024-10-24 RX ADMIN — OXYCODONE HYDROCHLORIDE 2.5 MILLIGRAM(S): 30 TABLET ORAL at 09:36

## 2024-10-24 RX ADMIN — Medication 1000 UNIT(S): at 08:21

## 2024-10-24 RX ADMIN — POLYETHYLENE GLYCOL 3350 17 GRAM(S): 17 POWDER, FOR SOLUTION ORAL at 08:22

## 2024-10-24 RX ADMIN — Medication 2 TABLET(S): at 19:58

## 2024-10-24 RX ADMIN — Medication 1 TABLET(S): at 08:20

## 2024-10-24 RX ADMIN — OXYCODONE HYDROCHLORIDE 2.5 MILLIGRAM(S): 30 TABLET ORAL at 10:13

## 2024-10-24 RX ADMIN — DULOXETINE HYDROCHLORIDE 30 MILLIGRAM(S): 30 CAPSULE, DELAYED RELEASE ORAL at 08:21

## 2024-10-24 RX ADMIN — PANCRELIPASE 1 CAPSULE(S): 16800; 98400; 56800 CAPSULE, DELAYED RELEASE ORAL at 16:15

## 2024-10-24 RX ADMIN — PREGABALIN 25 MILLIGRAM(S): 150 CAPSULE ORAL at 08:21

## 2024-10-24 RX ADMIN — PREGABALIN 25 MILLIGRAM(S): 150 CAPSULE ORAL at 12:04

## 2024-10-24 NOTE — BH INPATIENT PSYCHIATRY PROGRESS NOTE - MSE OPTIONS
Unstructured MSE
Structured MSE

## 2024-10-24 NOTE — BH INPATIENT PSYCHIATRY PROGRESS NOTE - NSBHLEGALSTATUSDATE_PSY_ALL_CORE
08-Oct-2024

## 2024-10-24 NOTE — BH INPATIENT PSYCHIATRY PROGRESS NOTE - NSTXDEPRESGOAL_PSY_ALL_CORE
Will identify 2 coping skills that assist in improving mood

## 2024-10-24 NOTE — BH INPATIENT PSYCHIATRY PROGRESS NOTE - NSTXDEPRESDATEEST_PSY_ALL_CORE
09-Oct-2024

## 2024-10-24 NOTE — BH INPATIENT PSYCHIATRY PROGRESS NOTE - NSTXDCOTHRDATEEST_PSY_ALL_CORE
09-Oct-2024
11-Oct-2024
11-Oct-2024
09-Oct-2024
11-Oct-2024
18-Oct-2024
11-Oct-2024
18-Oct-2024
09-Oct-2024
18-Oct-2024
11-Oct-2024
11-Oct-2024
18-Oct-2024
18-Oct-2024

## 2024-10-24 NOTE — [facility] INPATIENT PSYCHIATRY PROGRESS NOTE - CURRENT MEDICATION
MEDICATIONS  (STANDING):  calcium carbonate   1250 mG (OsCal) 1 Tablet(s) Oral <User Schedule>  cholecalciferol 1000 Unit(s) Oral daily  DULoxetine 30 milliGRAM(s) Oral daily  ferrous    sulfate 325 milliGRAM(s) Oral daily  melatonin. 5 milliGRAM(s) Oral <User Schedule>  multivitamin/minerals 1 Tablet(s) Oral daily  pancrelipase  (CREON  6,000 Lipase Units) 1 Capsule(s) Oral three times a day with meals  polyethylene glycol 3350 17 Gram(s) Oral daily  pregabalin 25 milliGRAM(s) Oral three times a day  senna 2 Tablet(s) Oral at bedtime    MEDICATIONS  (PRN):  acetaminophen     Tablet .. 650 milliGRAM(s) Oral every 6 hours PRN Mild Pain (1 - 3)  aluminum hydroxide/magnesium hydroxide/simethicone Suspension 30 milliLiter(s) Oral every 4 hours PRN Dyspepsia  bisacodyl 5 milliGRAM(s) Oral every 12 hours PRN Constipation  bisacodyl Suppository 10 milliGRAM(s) Rectal daily PRN Constipation  diazepam    Tablet 5 milliGRAM(s) Oral every 12 hours PRN anxiety  oxyCODONE    IR 2.5 milliGRAM(s) Oral every 6 hours PRN Severe Pain  pancrelipase  (CREON  6,000 Lipase Units) 2 Capsule(s) Oral two times a day with meals PRN for snacks  saline laxative (FLEET) Rectal Enema 1 Enema Rectal once PRN Constipation  simethicone 80 milliGRAM(s) Chew every 8 hours PRN Gas  
MEDICATIONS  (STANDING):  cholecalciferol 1000 Unit(s) Oral daily  DULoxetine 20 milliGRAM(s) Oral daily  ferrous    sulfate 325 milliGRAM(s) Oral daily  LORazepam   Injectable 1 milliGRAM(s) IntraMuscular once  pancrelipase  (CREON  6,000 Lipase Units) 1 Capsule(s) Oral three times a day with meals  pregabalin 25 milliGRAM(s) Oral three times a day  senna 2 Tablet(s) Oral at bedtime    MEDICATIONS  (PRN):  acetaminophen     Tablet .. 650 milliGRAM(s) Oral every 6 hours PRN Mild Pain (1 - 3)  aluminum hydroxide/magnesium hydroxide/simethicone Suspension 30 milliLiter(s) Oral every 4 hours PRN Dyspepsia  bisacodyl 5 milliGRAM(s) Oral every 12 hours PRN Constipation  bisacodyl Suppository 10 milliGRAM(s) Rectal daily PRN Constipation  haloperidol     Tablet 1 milliGRAM(s) Oral every 6 hours PRN Agitation  haloperidol    Injectable 1 milliGRAM(s) IntraMuscular once PRN agitation  LORazepam     Tablet 1 milliGRAM(s) Oral every 6 hours PRN anxiety  melatonin. 3 milliGRAM(s) Oral at bedtime PRN Insomnia  oxyCODONE    IR 2.5 milliGRAM(s) Oral every 6 hours PRN Severe Pain  pancrelipase  (CREON  6,000 Lipase Units) 2 Capsule(s) Oral with dinner PRN Bedtime snack  polyethylene glycol 3350 17 Gram(s) Oral daily PRN Constipation  saline laxative (FLEET) Rectal Enema 1 Enema Rectal once PRN Constipation  simethicone 80 milliGRAM(s) Chew every 8 hours PRN Gas  
MEDICATIONS  (STANDING):  cholecalciferol 1000 Unit(s) Oral daily  DULoxetine 20 milliGRAM(s) Oral daily  ferrous    sulfate 325 milliGRAM(s) Oral daily  LORazepam   Injectable 1 milliGRAM(s) IntraMuscular once  pancrelipase  (CREON  6,000 Lipase Units) 6 Capsule(s) Oral three times a day with meals  pregabalin 25 milliGRAM(s) Oral three times a day  senna 2 Tablet(s) Oral at bedtime    MEDICATIONS  (PRN):  acetaminophen     Tablet .. 650 milliGRAM(s) Oral every 6 hours PRN Mild Pain (1 - 3)  aluminum hydroxide/magnesium hydroxide/simethicone Suspension 30 milliLiter(s) Oral every 4 hours PRN Dyspepsia  bisacodyl 5 milliGRAM(s) Oral every 12 hours PRN Constipation  bisacodyl Suppository 10 milliGRAM(s) Rectal daily PRN Constipation  diazepam    Tablet 5 milliGRAM(s) Oral two times a day PRN anxiety  haloperidol     Tablet 1 milliGRAM(s) Oral every 6 hours PRN Agitation  haloperidol    Injectable 1 milliGRAM(s) IntraMuscular once PRN agitation  LORazepam     Tablet 1 milliGRAM(s) Oral every 6 hours PRN agitation  melatonin. 3 milliGRAM(s) Oral at bedtime PRN Insomnia  oxyCODONE    IR 2.5 milliGRAM(s) Oral every 6 hours PRN Severe Pain  pancrelipase  (CREON  6,000 Lipase Units) 2 Capsule(s) Oral with dinner PRN Bedtime snack  polyethylene glycol 3350 17 Gram(s) Oral daily PRN Constipation  saline laxative (FLEET) Rectal Enema 1 Enema Rectal once PRN Constipation  simethicone 80 milliGRAM(s) Chew every 8 hours PRN Gas  
MEDICATIONS  (STANDING):  calcium carbonate   1250 mG (OsCal) 1 Tablet(s) Oral <User Schedule>  cholecalciferol 1000 Unit(s) Oral daily  DULoxetine 30 milliGRAM(s) Oral daily  ferrous    sulfate 325 milliGRAM(s) Oral daily  melatonin. 5 milliGRAM(s) Oral <User Schedule>  multivitamin/minerals 1 Tablet(s) Oral daily  pancrelipase  (CREON  6,000 Lipase Units) 1 Capsule(s) Oral three times a day with meals  polyethylene glycol 3350 17 Gram(s) Oral daily  pregabalin 25 milliGRAM(s) Oral three times a day  senna 2 Tablet(s) Oral at bedtime    MEDICATIONS  (PRN):  acetaminophen     Tablet .. 650 milliGRAM(s) Oral every 6 hours PRN Mild Pain (1 - 3)  aluminum hydroxide/magnesium hydroxide/simethicone Suspension 30 milliLiter(s) Oral every 4 hours PRN Dyspepsia  bisacodyl 5 milliGRAM(s) Oral every 12 hours PRN Constipation  bisacodyl Suppository 10 milliGRAM(s) Rectal daily PRN Constipation  diazepam    Tablet 5 milliGRAM(s) Oral every 12 hours PRN anxiety  oxyCODONE    IR 2.5 milliGRAM(s) Oral every 6 hours PRN Severe Pain  pancrelipase  (CREON  6,000 Lipase Units) 2 Capsule(s) Oral two times a day with meals PRN for snacks  saline laxative (FLEET) Rectal Enema 1 Enema Rectal once PRN Constipation  simethicone 80 milliGRAM(s) Chew every 8 hours PRN Gas  
MEDICATIONS  (STANDING):  calcium carbonate   1250 mG (OsCal) 1 Tablet(s) Oral <User Schedule>  cholecalciferol 1000 Unit(s) Oral daily  DULoxetine 30 milliGRAM(s) Oral daily  ferrous    sulfate 325 milliGRAM(s) Oral daily  multivitamin/minerals 1 Tablet(s) Oral daily  pancrelipase  (CREON  6,000 Lipase Units) 1 Capsule(s) Oral three times a day with meals  polyethylene glycol 3350 17 Gram(s) Oral daily  pregabalin 25 milliGRAM(s) Oral three times a day  senna 2 Tablet(s) Oral at bedtime    MEDICATIONS  (PRN):  acetaminophen     Tablet .. 650 milliGRAM(s) Oral every 6 hours PRN Mild Pain (1 - 3)  aluminum hydroxide/magnesium hydroxide/simethicone Suspension 30 milliLiter(s) Oral every 4 hours PRN Dyspepsia  bisacodyl 5 milliGRAM(s) Oral every 12 hours PRN Constipation  bisacodyl Suppository 10 milliGRAM(s) Rectal daily PRN Constipation  diazepam    Tablet 5 milliGRAM(s) Oral two times a day PRN anxiety  melatonin. 3 milliGRAM(s) Oral at bedtime PRN Insomnia  oxyCODONE    IR 2.5 milliGRAM(s) Oral every 6 hours PRN Severe Pain  pancrelipase  (CREON  6,000 Lipase Units) 2 Capsule(s) Oral two times a day with meals PRN for snacks  saline laxative (FLEET) Rectal Enema 1 Enema Rectal once PRN Constipation  simethicone 80 milliGRAM(s) Chew every 8 hours PRN Gas  
MEDICATIONS  (STANDING):  calcium carbonate   1250 mG (OsCal) 1 Tablet(s) Oral <User Schedule>  cholecalciferol 1000 Unit(s) Oral daily  DULoxetine 20 milliGRAM(s) Oral daily  ferrous    sulfate 325 milliGRAM(s) Oral daily  multivitamin/minerals 1 Tablet(s) Oral daily  pancrelipase  (CREON  6,000 Lipase Units) 1 Capsule(s) Oral three times a day with meals  polyethylene glycol 3350 17 Gram(s) Oral daily  pregabalin 25 milliGRAM(s) Oral three times a day  senna 2 Tablet(s) Oral at bedtime    MEDICATIONS  (PRN):  acetaminophen     Tablet .. 650 milliGRAM(s) Oral every 6 hours PRN Mild Pain (1 - 3)  aluminum hydroxide/magnesium hydroxide/simethicone Suspension 30 milliLiter(s) Oral every 4 hours PRN Dyspepsia  bisacodyl 5 milliGRAM(s) Oral every 12 hours PRN Constipation  bisacodyl Suppository 10 milliGRAM(s) Rectal daily PRN Constipation  diazepam    Tablet 5 milliGRAM(s) Oral two times a day PRN anxiety  melatonin. 3 milliGRAM(s) Oral at bedtime PRN Insomnia  oxyCODONE    IR 2.5 milliGRAM(s) Oral every 6 hours PRN Severe Pain  pancrelipase  (CREON  6,000 Lipase Units) 2 Capsule(s) Oral two times a day with meals PRN for snacks  saline laxative (FLEET) Rectal Enema 1 Enema Rectal once PRN Constipation  simethicone 80 milliGRAM(s) Chew every 8 hours PRN Gas  
MEDICATIONS  (STANDING):  calcium carbonate   1250 mG (OsCal) 1 Tablet(s) Oral <User Schedule>  cholecalciferol 1000 Unit(s) Oral daily  DULoxetine 20 milliGRAM(s) Oral daily  ferrous    sulfate 325 milliGRAM(s) Oral daily  multivitamin/minerals 1 Tablet(s) Oral daily  pancrelipase  (CREON  6,000 Lipase Units) 1 Capsule(s) Oral three times a day with meals  polyethylene glycol 3350 17 Gram(s) Oral daily  pregabalin 25 milliGRAM(s) Oral three times a day  senna 2 Tablet(s) Oral at bedtime    MEDICATIONS  (PRN):  acetaminophen     Tablet .. 650 milliGRAM(s) Oral every 6 hours PRN Mild Pain (1 - 3)  aluminum hydroxide/magnesium hydroxide/simethicone Suspension 30 milliLiter(s) Oral every 4 hours PRN Dyspepsia  bisacodyl 5 milliGRAM(s) Oral every 12 hours PRN Constipation  bisacodyl Suppository 10 milliGRAM(s) Rectal daily PRN Constipation  diazepam    Tablet 5 milliGRAM(s) Oral two times a day PRN anxiety  melatonin. 3 milliGRAM(s) Oral at bedtime PRN Insomnia  oxyCODONE    IR 2.5 milliGRAM(s) Oral every 6 hours PRN Severe Pain  pancrelipase  (CREON  6,000 Lipase Units) 2 Capsule(s) Oral two times a day with meals PRN for snacks  saline laxative (FLEET) Rectal Enema 1 Enema Rectal once PRN Constipation  simethicone 80 milliGRAM(s) Chew every 8 hours PRN Gas  
MEDICATIONS  (STANDING):  calcium carbonate   1250 mG (OsCal) 1 Tablet(s) Oral <User Schedule>  cholecalciferol 1000 Unit(s) Oral daily  DULoxetine 30 milliGRAM(s) Oral daily  ferrous    sulfate 325 milliGRAM(s) Oral daily  multivitamin/minerals 1 Tablet(s) Oral daily  pancrelipase  (CREON  6,000 Lipase Units) 1 Capsule(s) Oral three times a day with meals  polyethylene glycol 3350 17 Gram(s) Oral daily  pregabalin 25 milliGRAM(s) Oral three times a day  senna 2 Tablet(s) Oral at bedtime    MEDICATIONS  (PRN):  acetaminophen     Tablet .. 650 milliGRAM(s) Oral every 6 hours PRN Mild Pain (1 - 3)  aluminum hydroxide/magnesium hydroxide/simethicone Suspension 30 milliLiter(s) Oral every 4 hours PRN Dyspepsia  bisacodyl 5 milliGRAM(s) Oral every 12 hours PRN Constipation  bisacodyl Suppository 10 milliGRAM(s) Rectal daily PRN Constipation  diazepam    Tablet 5 milliGRAM(s) Oral two times a day PRN anxiety  melatonin. 3 milliGRAM(s) Oral at bedtime PRN Insomnia  oxyCODONE    IR 2.5 milliGRAM(s) Oral every 6 hours PRN Severe Pain  pancrelipase  (CREON  6,000 Lipase Units) 2 Capsule(s) Oral two times a day with meals PRN for snacks  saline laxative (FLEET) Rectal Enema 1 Enema Rectal once PRN Constipation  simethicone 80 milliGRAM(s) Chew every 8 hours PRN Gas  
MEDICATIONS  (STANDING):  calcium carbonate   1250 mG (OsCal) 1 Tablet(s) Oral at bedtime  cholecalciferol 1000 Unit(s) Oral daily  DULoxetine 20 milliGRAM(s) Oral daily  ferrous    sulfate 325 milliGRAM(s) Oral daily  LORazepam   Injectable 1 milliGRAM(s) IntraMuscular once  multivitamin/minerals 1 Tablet(s) Oral daily  pancrelipase  (CREON  6,000 Lipase Units) 1 Capsule(s) Oral three times a day with meals  polyethylene glycol 3350 17 Gram(s) Oral daily  pregabalin 25 milliGRAM(s) Oral three times a day  senna 2 Tablet(s) Oral at bedtime    MEDICATIONS  (PRN):  acetaminophen     Tablet .. 650 milliGRAM(s) Oral every 6 hours PRN Mild Pain (1 - 3)  aluminum hydroxide/magnesium hydroxide/simethicone Suspension 30 milliLiter(s) Oral every 4 hours PRN Dyspepsia  bisacodyl 5 milliGRAM(s) Oral every 12 hours PRN Constipation  bisacodyl Suppository 10 milliGRAM(s) Rectal daily PRN Constipation  diazepam    Tablet 5 milliGRAM(s) Oral two times a day PRN anxiety  melatonin. 3 milliGRAM(s) Oral at bedtime PRN Insomnia  oxyCODONE    IR 2.5 milliGRAM(s) Oral every 6 hours PRN Severe Pain  pancrelipase  (CREON  6,000 Lipase Units) 2 Capsule(s) Oral with dinner PRN Bedtime snack  saline laxative (FLEET) Rectal Enema 1 Enema Rectal once PRN Constipation  simethicone 80 milliGRAM(s) Chew every 8 hours PRN Gas  
MEDICATIONS  (STANDING):  calcium carbonate   1250 mG (OsCal) 1 Tablet(s) Oral at bedtime  cholecalciferol 1000 Unit(s) Oral daily  DULoxetine 20 milliGRAM(s) Oral daily  ferrous    sulfate 325 milliGRAM(s) Oral daily  LORazepam   Injectable 1 milliGRAM(s) IntraMuscular once  multivitamin/minerals 1 Tablet(s) Oral daily  pancrelipase  (CREON  6,000 Lipase Units) 1 Capsule(s) Oral three times a day with meals  polyethylene glycol 3350 17 Gram(s) Oral daily  pregabalin 25 milliGRAM(s) Oral three times a day  senna 2 Tablet(s) Oral at bedtime    MEDICATIONS  (PRN):  acetaminophen     Tablet .. 650 milliGRAM(s) Oral every 6 hours PRN Mild Pain (1 - 3)  aluminum hydroxide/magnesium hydroxide/simethicone Suspension 30 milliLiter(s) Oral every 4 hours PRN Dyspepsia  bisacodyl 5 milliGRAM(s) Oral every 12 hours PRN Constipation  bisacodyl Suppository 10 milliGRAM(s) Rectal daily PRN Constipation  diazepam    Tablet 5 milliGRAM(s) Oral two times a day PRN anxiety  melatonin. 3 milliGRAM(s) Oral at bedtime PRN Insomnia  oxyCODONE    IR 2.5 milliGRAM(s) Oral every 6 hours PRN Severe Pain  pancrelipase  (CREON  6,000 Lipase Units) 2 Capsule(s) Oral with dinner PRN Bedtime snack  saline laxative (FLEET) Rectal Enema 1 Enema Rectal once PRN Constipation  simethicone 80 milliGRAM(s) Chew every 8 hours PRN Gas  
MEDICATIONS  (STANDING):  cholecalciferol 1000 Unit(s) Oral daily  DULoxetine 20 milliGRAM(s) Oral daily  DULoxetine 20 milliGRAM(s) Oral once  ferrous    sulfate 325 milliGRAM(s) Oral daily  LORazepam   Injectable 1 milliGRAM(s) IntraMuscular once  pancrelipase  (CREON  6,000 Lipase Units) 6 Capsule(s) Oral three times a day with meals  pregabalin 25 milliGRAM(s) Oral three times a day    MEDICATIONS  (PRN):  acetaminophen     Tablet .. 650 milliGRAM(s) Oral every 6 hours PRN Mild Pain (1 - 3)  aluminum hydroxide/magnesium hydroxide/simethicone Suspension 30 milliLiter(s) Oral every 4 hours PRN Dyspepsia  diazepam    Tablet 5 milliGRAM(s) Oral two times a day PRN anxiety  haloperidol     Tablet 1 milliGRAM(s) Oral every 6 hours PRN Agitation  haloperidol    Injectable 1 milliGRAM(s) IntraMuscular once PRN agitation  LORazepam     Tablet 1 milliGRAM(s) Oral every 6 hours PRN agitation  melatonin. 3 milliGRAM(s) Oral at bedtime PRN Insomnia  oxyCODONE    IR 2.5 milliGRAM(s) Oral every 6 hours PRN Severe Pain  pancrelipase  (CREON  6,000 Lipase Units) 2 Capsule(s) Oral with dinner PRN Bedtime snack  polyethylene glycol 3350 17 Gram(s) Oral daily PRN Constipation  senna 2 Tablet(s) Oral daily PRN Constipation  simethicone 80 milliGRAM(s) Chew every 8 hours PRN Gas  
MEDICATIONS  (STANDING):  cholecalciferol 1000 Unit(s) Oral daily  DULoxetine 20 milliGRAM(s) Oral daily  ferrous    sulfate 325 milliGRAM(s) Oral daily  LORazepam   Injectable 1 milliGRAM(s) IntraMuscular once  pancrelipase  (CREON  6,000 Lipase Units) 1 Capsule(s) Oral three times a day with meals  pregabalin 25 milliGRAM(s) Oral three times a day  senna 2 Tablet(s) Oral at bedtime    MEDICATIONS  (PRN):  acetaminophen     Tablet .. 650 milliGRAM(s) Oral every 6 hours PRN Mild Pain (1 - 3)  aluminum hydroxide/magnesium hydroxide/simethicone Suspension 30 milliLiter(s) Oral every 4 hours PRN Dyspepsia  bisacodyl 5 milliGRAM(s) Oral every 12 hours PRN Constipation  bisacodyl Suppository 10 milliGRAM(s) Rectal daily PRN Constipation  diazepam    Tablet 5 milliGRAM(s) Oral two times a day PRN anxiety  haloperidol     Tablet 1 milliGRAM(s) Oral every 6 hours PRN Agitation  haloperidol    Injectable 1 milliGRAM(s) IntraMuscular once PRN agitation  LORazepam     Tablet 1 milliGRAM(s) Oral every 6 hours PRN agitation  melatonin. 3 milliGRAM(s) Oral at bedtime PRN Insomnia  oxyCODONE    IR 2.5 milliGRAM(s) Oral every 6 hours PRN Severe Pain  pancrelipase  (CREON  6,000 Lipase Units) 2 Capsule(s) Oral with dinner PRN Bedtime snack  polyethylene glycol 3350 17 Gram(s) Oral daily PRN Constipation  saline laxative (FLEET) Rectal Enema 1 Enema Rectal once PRN Constipation  simethicone 80 milliGRAM(s) Chew every 8 hours PRN Gas  
MEDICATIONS  (STANDING):  cholecalciferol 1000 Unit(s) Oral daily  DULoxetine 20 milliGRAM(s) Oral daily  ferrous    sulfate 325 milliGRAM(s) Oral daily  LORazepam   Injectable 1 milliGRAM(s) IntraMuscular once  pancrelipase  (CREON  6,000 Lipase Units) 1 Capsule(s) Oral three times a day with meals  polyethylene glycol 3350 17 Gram(s) Oral daily  pregabalin 25 milliGRAM(s) Oral three times a day  senna 2 Tablet(s) Oral at bedtime    MEDICATIONS  (PRN):  acetaminophen     Tablet .. 650 milliGRAM(s) Oral every 6 hours PRN Mild Pain (1 - 3)  aluminum hydroxide/magnesium hydroxide/simethicone Suspension 30 milliLiter(s) Oral every 4 hours PRN Dyspepsia  bisacodyl 5 milliGRAM(s) Oral every 12 hours PRN Constipation  bisacodyl Suppository 10 milliGRAM(s) Rectal daily PRN Constipation  haloperidol     Tablet 1 milliGRAM(s) Oral every 6 hours PRN Agitation  haloperidol    Injectable 1 milliGRAM(s) IntraMuscular once PRN agitation  LORazepam     Tablet 1 milliGRAM(s) Oral every 6 hours PRN anxiety  melatonin. 3 milliGRAM(s) Oral at bedtime PRN Insomnia  oxyCODONE    IR 2.5 milliGRAM(s) Oral every 6 hours PRN Severe Pain  pancrelipase  (CREON  6,000 Lipase Units) 2 Capsule(s) Oral with dinner PRN Bedtime snack  saline laxative (FLEET) Rectal Enema 1 Enema Rectal once PRN Constipation  simethicone 80 milliGRAM(s) Chew every 8 hours PRN Gas  
MEDICATIONS  (STANDING):  calcium carbonate   1250 mG (OsCal) 1 Tablet(s) Oral <User Schedule>  cholecalciferol 1000 Unit(s) Oral daily  DULoxetine 30 milliGRAM(s) Oral daily  ferrous    sulfate 325 milliGRAM(s) Oral daily  melatonin. 5 milliGRAM(s) Oral <User Schedule>  multivitamin/minerals 1 Tablet(s) Oral daily  pancrelipase  (CREON  6,000 Lipase Units) 1 Capsule(s) Oral three times a day with meals  polyethylene glycol 3350 17 Gram(s) Oral daily  pregabalin 25 milliGRAM(s) Oral three times a day  senna 2 Tablet(s) Oral at bedtime    MEDICATIONS  (PRN):  acetaminophen     Tablet .. 650 milliGRAM(s) Oral every 6 hours PRN Mild Pain (1 - 3)  aluminum hydroxide/magnesium hydroxide/simethicone Suspension 30 milliLiter(s) Oral every 4 hours PRN Dyspepsia  bisacodyl 5 milliGRAM(s) Oral every 12 hours PRN Constipation  bisacodyl Suppository 10 milliGRAM(s) Rectal daily PRN Constipation  diazepam    Tablet 5 milliGRAM(s) Oral two times a day PRN anxiety  oxyCODONE    IR 2.5 milliGRAM(s) Oral every 6 hours PRN Severe Pain  pancrelipase  (CREON  6,000 Lipase Units) 2 Capsule(s) Oral two times a day with meals PRN for snacks  saline laxative (FLEET) Rectal Enema 1 Enema Rectal once PRN Constipation  simethicone 80 milliGRAM(s) Chew every 8 hours PRN Gas  
MEDICATIONS  (STANDING):  calcium carbonate   1250 mG (OsCal) 1 Tablet(s) Oral <User Schedule>  cholecalciferol 1000 Unit(s) Oral daily  DULoxetine 30 milliGRAM(s) Oral daily  ferrous    sulfate 325 milliGRAM(s) Oral daily  multivitamin/minerals 1 Tablet(s) Oral daily  pancrelipase  (CREON  6,000 Lipase Units) 1 Capsule(s) Oral three times a day with meals  polyethylene glycol 3350 17 Gram(s) Oral daily  pregabalin 25 milliGRAM(s) Oral three times a day  senna 2 Tablet(s) Oral at bedtime    MEDICATIONS  (PRN):  acetaminophen     Tablet .. 650 milliGRAM(s) Oral every 6 hours PRN Mild Pain (1 - 3)  aluminum hydroxide/magnesium hydroxide/simethicone Suspension 30 milliLiter(s) Oral every 4 hours PRN Dyspepsia  bisacodyl 5 milliGRAM(s) Oral every 12 hours PRN Constipation  bisacodyl Suppository 10 milliGRAM(s) Rectal daily PRN Constipation  diazepam    Tablet 5 milliGRAM(s) Oral two times a day PRN anxiety  melatonin. 3 milliGRAM(s) Oral at bedtime PRN Insomnia  oxyCODONE    IR 2.5 milliGRAM(s) Oral every 6 hours PRN Severe Pain  pancrelipase  (CREON  6,000 Lipase Units) 2 Capsule(s) Oral two times a day with meals PRN for snacks  saline laxative (FLEET) Rectal Enema 1 Enema Rectal once PRN Constipation  simethicone 80 milliGRAM(s) Chew every 8 hours PRN Gas  
MEDICATIONS  (STANDING):  calcium carbonate   1250 mG (OsCal) 1 Tablet(s) Oral <User Schedule>  cholecalciferol 1000 Unit(s) Oral daily  DULoxetine 30 milliGRAM(s) Oral daily  ferrous    sulfate 325 milliGRAM(s) Oral daily  melatonin. 5 milliGRAM(s) Oral <User Schedule>  multivitamin/minerals 1 Tablet(s) Oral daily  pancrelipase  (CREON  6,000 Lipase Units) 1 Capsule(s) Oral three times a day with meals  polyethylene glycol 3350 17 Gram(s) Oral daily  pregabalin 25 milliGRAM(s) Oral three times a day  senna 2 Tablet(s) Oral at bedtime    MEDICATIONS  (PRN):  acetaminophen     Tablet .. 650 milliGRAM(s) Oral every 6 hours PRN Mild Pain (1 - 3)  aluminum hydroxide/magnesium hydroxide/simethicone Suspension 30 milliLiter(s) Oral every 4 hours PRN Dyspepsia  bisacodyl 5 milliGRAM(s) Oral every 12 hours PRN Constipation  bisacodyl Suppository 10 milliGRAM(s) Rectal daily PRN Constipation  diazepam    Tablet 5 milliGRAM(s) Oral every 12 hours PRN anxiety  oxyCODONE    IR 2.5 milliGRAM(s) Oral every 6 hours PRN Severe Pain  pancrelipase  (CREON  6,000 Lipase Units) 2 Capsule(s) Oral two times a day with meals PRN for snacks  saline laxative (FLEET) Rectal Enema 1 Enema Rectal once PRN Constipation  simethicone 80 milliGRAM(s) Chew every 8 hours PRN Gas

## 2024-10-24 NOTE — BH INPATIENT PSYCHIATRY PROGRESS NOTE - NSTXDEPRESDATETRGT_PSY_ALL_CORE
16-Oct-2024

## 2024-10-24 NOTE — BH INPATIENT PSYCHIATRY PROGRESS NOTE - NSTXDCOTHRDATETRGT_PSY_ALL_CORE
25-Oct-2024
25-Oct-2024
18-Oct-2024
16-Oct-2024
25-Oct-2024
18-Oct-2024
16-Oct-2024
16-Oct-2024
25-Oct-2024
18-Oct-2024
25-Oct-2024

## 2024-10-24 NOTE — BH INPATIENT PSYCHIATRY PROGRESS NOTE - NSBHMETABOLIC_PSY_ALL_CORE_FT
BMI: BMI (kg/m2): 16.1 (09-29-24 @ 08:14)  HbA1c:   Glucose:   BP: --Vital Signs Last 24 Hrs  T(C): 36.3 (10-24-24 @ 07:45), Max: 36.3 (10-24-24 @ 07:45)  T(F): 97.4 (10-24-24 @ 07:45), Max: 97.4 (10-24-24 @ 07:45)  HR: --  BP: --  BP(mean): --  RR: --  SpO2: --    Orthostatic VS  10-24-24 @ 07:45  Lying BP: 100/62 HR: 71  Sitting BP: 97/60 HR: 70  Standing BP: --/-- HR: --  Site: upper left arm  Mode: electronic  Orthostatic VS  10-23-24 @ 07:47  Lying BP: 103/68 HR: 66  Sitting BP: 107/66 HR: 67  Standing BP: --/-- HR: --  Site: --  Mode: --    Lipid Panel:

## 2024-10-24 NOTE — BH INPATIENT PSYCHIATRY PROGRESS NOTE - NSTXDEPRESINTERMD_PSY_ALL_CORE
Continue psychoeducation and psychopharm. Start Cymbalta 25mg daily. 

## 2024-10-24 NOTE — BH INPATIENT PSYCHIATRY PROGRESS NOTE - NSBHASSESSSUMMFT_PSY_ALL_CORE
Patient is a 69 yo female with PPHx of depression and anxiety with no prior inpatient psychiatric hospitalizations, currently in outpatient treatment with psychiatrist Dr. Puente, and PMH significant for chronic back pain,  irritable bowel syndrome and osteoporosis with hx of compression fractures who presented s/p suicide attempt via overdose on her medication night prior to ED presentation on 9/29.     On arrival to  patient's presentation is consistent with MDD in the context of functional decline and worsening chronic pain over the past year. Pt reports low mood, hopelessness, anhedonia and passive SI. Denies active suicidal ideation, intent or plans now. At this time pt is very dysphoric and focused on being discharged. Agreed to start trial of Cymbalta, which she only took for 2-3 days before stopping it in the past.    10/11 Clinical Update: Continues to endorse low mood, feeling hopeless and unmotivated in the context of chronic pain. Declines to meet with hospitalist today to go over pain management and refuses to increase her dose of Cymbalta, afraid of side effects. Reports pain management today is "adequate". Preoccupied with discharge and attributing her depression to being in the hospital. Minimizing symptoms at home and suicide attempt. Would titrate Cymbalta if pt is amenable.  10/12 Dysphoric irritable, no SI, pain focus. Patient encouraged to give chance for Cymbalta to work to help pain and depressed mood  10/13 Irritable, somatically preoccupied with focus on receiving her Creon, focused on pain and constipation. Encouraged to adhere to Cymbalta and agreed to add daily miralax to regimen for constipation. If develop loose BM, will reduce to PRN.  10/14: Dysphoric, irritable, partially compliant with Cymbalta. Continues to report constipation, will continue standing senna, miralax and prn dulcolax. Switched PRN ativan for PRN valium 5mg twice daily. Pt was on this dose of valium prn in J and reports better results for both anxiety and pain control. Added OsCal supplementation as per patient's request. Full spine MRI without contrast ordered and discussed with MRI supervisor and hospitalist. Will discuss with anesthesia. Pt will have to be NPO after midnight, discussed with nursing.   10/15: Pt is under better spirits today, denies SI/HI and seems future focused. Adamantly refusing MRI now. Patient's affect and decision making seems highly dependent on her perception of pain control at any given time. Thus far any improvement has been highly inconsistent. Pt refusing to take higher doses of Cymbalta for now.   10/16: Pt is more dysphoric today. Presents catastrophic thinking and very limited frustration tolerance. Refusing to go up on her dose of Cymbalta. Continues to refuse MRI.  10/17: Dysphoric, withdrawn, negative and expressing hopelessness. Refusing to go up on Cymbalta. Spoke with  who will meet with pt today and encourage her to consider trying a higher dose of her antidepressant.  10/18: Remains dysphoric, pessimistic and anhedonic. Agreed to increase her dose of Cymbalta to 30mg.   10/19 anxious, dysphoric, no SI/HI, no psychosis. Tolerating cymbalta well.  10/20 improving mood, anxiety, pain. No SI/HI, no psychosis.  10/21: Dysphoric and pessimistic again this morning. Requesting more sedating medications.  10/22: Pt presents brighter affect this morning. Denies SI/HI.  agrees pt seems to be having more "good days". Possible discharge by the end of this week once home care is reinstated.   10/23: Affect is overall more reactive and brighter. Pt reports gains in terms of mood and denies suicidal ideation. Denies side effects from her medications. Pt presenting a more positive outlook on life and is more future oriented. Discussed discharge planning for this Friday. Homecare is set up to start Friday at noon. Pt verbalized understanding.   10/24: Largely stable presentation from yesterday, pt focused on not receiving oxycodone yesterday for pain but able to cope. Denies SI/I/P. Pt looking forward to dc on Friday. Confirmed Lyrica and oxycodone approved and will be available 10/25 for anticipated DC    -Admit to [facility]  -CO for hospital bed  -Legal status 2PC  -Continue Cymbalta 30mg daily for depression (inc from 20mg on 10/18)  -Continue pain management as per Pain Medicine recs --> Lyrica 25mg PO TID and Oxycodone 2.5mg PO every 4 hrs PRN  -MRI deferred due to pt refusal  -PT consult  -Nutrition consult complete, recs appreciated  -Pt would benefit from meeting with the  when available

## 2024-10-24 NOTE — BH INPATIENT PSYCHIATRY PROGRESS NOTE - NSDCCRITERIA_PSY_ALL_CORE
improvement in symptoms 

## 2024-10-24 NOTE — BH INPATIENT PSYCHIATRY PROGRESS NOTE - NSBHATTESTTYPEVISIT_PSY_A_CORE
Attending Only
Attending with Resident/Fellow/Student
Attending Only
Attending Only

## 2024-10-24 NOTE — BH INPATIENT PSYCHIATRY PROGRESS NOTE - NSICDXBHTERTIARYDX_PSY_ALL_CORE
R/O Adjustment disorder with mixed anxiety and depressed mood   F43.23  

## 2024-10-24 NOTE — BH INPATIENT PSYCHIATRY PROGRESS NOTE - NSBHCHARTREVIEWVS_PSY_A_CORE FT
Vital Signs Last 24 Hrs  T(C): 36.3 (10-24-24 @ 07:45), Max: 36.3 (10-24-24 @ 07:45)  T(F): 97.4 (10-24-24 @ 07:45), Max: 97.4 (10-24-24 @ 07:45)  HR: --  BP: --  BP(mean): --  RR: --  SpO2: --    Orthostatic VS  10-24-24 @ 07:45  Lying BP: 100/62 HR: 71  Sitting BP: 97/60 HR: 70  Standing BP: --/-- HR: --  Site: upper left arm  Mode: electronic  Orthostatic VS  10-23-24 @ 07:47  Lying BP: 103/68 HR: 66  Sitting BP: 107/66 HR: 67  Standing BP: --/-- HR: --  Site: --  Mode: --

## 2024-10-24 NOTE — BH INPATIENT PSYCHIATRY PROGRESS NOTE - PRN MEDS
MEDICATIONS  (PRN):  acetaminophen     Tablet .. 650 milliGRAM(s) Oral every 6 hours PRN Mild Pain (1 - 3)  aluminum hydroxide/magnesium hydroxide/simethicone Suspension 30 milliLiter(s) Oral every 4 hours PRN Dyspepsia  bisacodyl 5 milliGRAM(s) Oral every 12 hours PRN Constipation  bisacodyl Suppository 10 milliGRAM(s) Rectal daily PRN Constipation  diazepam    Tablet 5 milliGRAM(s) Oral every 12 hours PRN anxiety  oxyCODONE    IR 2.5 milliGRAM(s) Oral every 6 hours PRN Severe Pain  pancrelipase  (CREON  6,000 Lipase Units) 2 Capsule(s) Oral two times a day with meals PRN for snacks  saline laxative (FLEET) Rectal Enema 1 Enema Rectal once PRN Constipation  simethicone 80 milliGRAM(s) Chew every 8 hours PRN Gas  
MEDICATIONS  (PRN):  acetaminophen     Tablet .. 650 milliGRAM(s) Oral every 6 hours PRN Mild Pain (1 - 3)  aluminum hydroxide/magnesium hydroxide/simethicone Suspension 30 milliLiter(s) Oral every 4 hours PRN Dyspepsia  bisacodyl 5 milliGRAM(s) Oral every 12 hours PRN Constipation  bisacodyl Suppository 10 milliGRAM(s) Rectal daily PRN Constipation  diazepam    Tablet 5 milliGRAM(s) Oral two times a day PRN anxiety  melatonin. 3 milliGRAM(s) Oral at bedtime PRN Insomnia  oxyCODONE    IR 2.5 milliGRAM(s) Oral every 6 hours PRN Severe Pain  pancrelipase  (CREON  6,000 Lipase Units) 2 Capsule(s) Oral with dinner PRN Bedtime snack  saline laxative (FLEET) Rectal Enema 1 Enema Rectal once PRN Constipation  simethicone 80 milliGRAM(s) Chew every 8 hours PRN Gas  
MEDICATIONS  (PRN):  acetaminophen     Tablet .. 650 milliGRAM(s) Oral every 6 hours PRN Mild Pain (1 - 3)  aluminum hydroxide/magnesium hydroxide/simethicone Suspension 30 milliLiter(s) Oral every 4 hours PRN Dyspepsia  bisacodyl 5 milliGRAM(s) Oral every 12 hours PRN Constipation  bisacodyl Suppository 10 milliGRAM(s) Rectal daily PRN Constipation  diazepam    Tablet 5 milliGRAM(s) Oral two times a day PRN anxiety  melatonin. 3 milliGRAM(s) Oral at bedtime PRN Insomnia  oxyCODONE    IR 2.5 milliGRAM(s) Oral every 6 hours PRN Severe Pain  pancrelipase  (CREON  6,000 Lipase Units) 2 Capsule(s) Oral two times a day with meals PRN for snacks  saline laxative (FLEET) Rectal Enema 1 Enema Rectal once PRN Constipation  simethicone 80 milliGRAM(s) Chew every 8 hours PRN Gas  
MEDICATIONS  (PRN):  acetaminophen     Tablet .. 650 milliGRAM(s) Oral every 6 hours PRN Mild Pain (1 - 3)  aluminum hydroxide/magnesium hydroxide/simethicone Suspension 30 milliLiter(s) Oral every 4 hours PRN Dyspepsia  bisacodyl 5 milliGRAM(s) Oral every 12 hours PRN Constipation  bisacodyl Suppository 10 milliGRAM(s) Rectal daily PRN Constipation  diazepam    Tablet 5 milliGRAM(s) Oral two times a day PRN anxiety  haloperidol     Tablet 1 milliGRAM(s) Oral every 6 hours PRN Agitation  haloperidol    Injectable 1 milliGRAM(s) IntraMuscular once PRN agitation  LORazepam     Tablet 1 milliGRAM(s) Oral every 6 hours PRN agitation  melatonin. 3 milliGRAM(s) Oral at bedtime PRN Insomnia  oxyCODONE    IR 2.5 milliGRAM(s) Oral every 6 hours PRN Severe Pain  pancrelipase  (CREON  6,000 Lipase Units) 2 Capsule(s) Oral with dinner PRN Bedtime snack  polyethylene glycol 3350 17 Gram(s) Oral daily PRN Constipation  saline laxative (FLEET) Rectal Enema 1 Enema Rectal once PRN Constipation  simethicone 80 milliGRAM(s) Chew every 8 hours PRN Gas  
MEDICATIONS  (PRN):  acetaminophen     Tablet .. 650 milliGRAM(s) Oral every 6 hours PRN Mild Pain (1 - 3)  aluminum hydroxide/magnesium hydroxide/simethicone Suspension 30 milliLiter(s) Oral every 4 hours PRN Dyspepsia  diazepam    Tablet 5 milliGRAM(s) Oral two times a day PRN anxiety  haloperidol     Tablet 1 milliGRAM(s) Oral every 6 hours PRN Agitation  haloperidol    Injectable 1 milliGRAM(s) IntraMuscular once PRN agitation  LORazepam     Tablet 1 milliGRAM(s) Oral every 6 hours PRN agitation  melatonin. 3 milliGRAM(s) Oral at bedtime PRN Insomnia  oxyCODONE    IR 2.5 milliGRAM(s) Oral every 6 hours PRN Severe Pain  pancrelipase  (CREON  6,000 Lipase Units) 2 Capsule(s) Oral with dinner PRN Bedtime snack  polyethylene glycol 3350 17 Gram(s) Oral daily PRN Constipation  senna 2 Tablet(s) Oral daily PRN Constipation  simethicone 80 milliGRAM(s) Chew every 8 hours PRN Gas  
MEDICATIONS  (PRN):  acetaminophen     Tablet .. 650 milliGRAM(s) Oral every 6 hours PRN Mild Pain (1 - 3)  aluminum hydroxide/magnesium hydroxide/simethicone Suspension 30 milliLiter(s) Oral every 4 hours PRN Dyspepsia  bisacodyl 5 milliGRAM(s) Oral every 12 hours PRN Constipation  bisacodyl Suppository 10 milliGRAM(s) Rectal daily PRN Constipation  diazepam    Tablet 5 milliGRAM(s) Oral every 12 hours PRN anxiety  oxyCODONE    IR 2.5 milliGRAM(s) Oral every 6 hours PRN Severe Pain  pancrelipase  (CREON  6,000 Lipase Units) 2 Capsule(s) Oral two times a day with meals PRN for snacks  saline laxative (FLEET) Rectal Enema 1 Enema Rectal once PRN Constipation  simethicone 80 milliGRAM(s) Chew every 8 hours PRN Gas  
MEDICATIONS  (PRN):  acetaminophen     Tablet .. 650 milliGRAM(s) Oral every 6 hours PRN Mild Pain (1 - 3)  aluminum hydroxide/magnesium hydroxide/simethicone Suspension 30 milliLiter(s) Oral every 4 hours PRN Dyspepsia  bisacodyl 5 milliGRAM(s) Oral every 12 hours PRN Constipation  bisacodyl Suppository 10 milliGRAM(s) Rectal daily PRN Constipation  haloperidol     Tablet 1 milliGRAM(s) Oral every 6 hours PRN Agitation  haloperidol    Injectable 1 milliGRAM(s) IntraMuscular once PRN agitation  LORazepam     Tablet 1 milliGRAM(s) Oral every 6 hours PRN anxiety  melatonin. 3 milliGRAM(s) Oral at bedtime PRN Insomnia  oxyCODONE    IR 2.5 milliGRAM(s) Oral every 6 hours PRN Severe Pain  pancrelipase  (CREON  6,000 Lipase Units) 2 Capsule(s) Oral with dinner PRN Bedtime snack  polyethylene glycol 3350 17 Gram(s) Oral daily PRN Constipation  saline laxative (FLEET) Rectal Enema 1 Enema Rectal once PRN Constipation  simethicone 80 milliGRAM(s) Chew every 8 hours PRN Gas  
MEDICATIONS  (PRN):  acetaminophen     Tablet .. 650 milliGRAM(s) Oral every 6 hours PRN Mild Pain (1 - 3)  aluminum hydroxide/magnesium hydroxide/simethicone Suspension 30 milliLiter(s) Oral every 4 hours PRN Dyspepsia  bisacodyl 5 milliGRAM(s) Oral every 12 hours PRN Constipation  bisacodyl Suppository 10 milliGRAM(s) Rectal daily PRN Constipation  haloperidol     Tablet 1 milliGRAM(s) Oral every 6 hours PRN Agitation  haloperidol    Injectable 1 milliGRAM(s) IntraMuscular once PRN agitation  LORazepam     Tablet 1 milliGRAM(s) Oral every 6 hours PRN anxiety  melatonin. 3 milliGRAM(s) Oral at bedtime PRN Insomnia  oxyCODONE    IR 2.5 milliGRAM(s) Oral every 6 hours PRN Severe Pain  pancrelipase  (CREON  6,000 Lipase Units) 2 Capsule(s) Oral with dinner PRN Bedtime snack  saline laxative (FLEET) Rectal Enema 1 Enema Rectal once PRN Constipation  simethicone 80 milliGRAM(s) Chew every 8 hours PRN Gas  
MEDICATIONS  (PRN):  acetaminophen     Tablet .. 650 milliGRAM(s) Oral every 6 hours PRN Mild Pain (1 - 3)  aluminum hydroxide/magnesium hydroxide/simethicone Suspension 30 milliLiter(s) Oral every 4 hours PRN Dyspepsia  bisacodyl 5 milliGRAM(s) Oral every 12 hours PRN Constipation  bisacodyl Suppository 10 milliGRAM(s) Rectal daily PRN Constipation  diazepam    Tablet 5 milliGRAM(s) Oral two times a day PRN anxiety  haloperidol     Tablet 1 milliGRAM(s) Oral every 6 hours PRN Agitation  haloperidol    Injectable 1 milliGRAM(s) IntraMuscular once PRN agitation  LORazepam     Tablet 1 milliGRAM(s) Oral every 6 hours PRN agitation  melatonin. 3 milliGRAM(s) Oral at bedtime PRN Insomnia  oxyCODONE    IR 2.5 milliGRAM(s) Oral every 6 hours PRN Severe Pain  pancrelipase  (CREON  6,000 Lipase Units) 2 Capsule(s) Oral with dinner PRN Bedtime snack  polyethylene glycol 3350 17 Gram(s) Oral daily PRN Constipation  saline laxative (FLEET) Rectal Enema 1 Enema Rectal once PRN Constipation  simethicone 80 milliGRAM(s) Chew every 8 hours PRN Gas  
MEDICATIONS  (PRN):  acetaminophen     Tablet .. 650 milliGRAM(s) Oral every 6 hours PRN Mild Pain (1 - 3)  aluminum hydroxide/magnesium hydroxide/simethicone Suspension 30 milliLiter(s) Oral every 4 hours PRN Dyspepsia  bisacodyl 5 milliGRAM(s) Oral every 12 hours PRN Constipation  bisacodyl Suppository 10 milliGRAM(s) Rectal daily PRN Constipation  diazepam    Tablet 5 milliGRAM(s) Oral two times a day PRN anxiety  melatonin. 3 milliGRAM(s) Oral at bedtime PRN Insomnia  oxyCODONE    IR 2.5 milliGRAM(s) Oral every 6 hours PRN Severe Pain  pancrelipase  (CREON  6,000 Lipase Units) 2 Capsule(s) Oral two times a day with meals PRN for snacks  saline laxative (FLEET) Rectal Enema 1 Enema Rectal once PRN Constipation  simethicone 80 milliGRAM(s) Chew every 8 hours PRN Gas  
MEDICATIONS  (PRN):  acetaminophen     Tablet .. 650 milliGRAM(s) Oral every 6 hours PRN Mild Pain (1 - 3)  aluminum hydroxide/magnesium hydroxide/simethicone Suspension 30 milliLiter(s) Oral every 4 hours PRN Dyspepsia  bisacodyl 5 milliGRAM(s) Oral every 12 hours PRN Constipation  bisacodyl Suppository 10 milliGRAM(s) Rectal daily PRN Constipation  diazepam    Tablet 5 milliGRAM(s) Oral two times a day PRN anxiety  melatonin. 3 milliGRAM(s) Oral at bedtime PRN Insomnia  oxyCODONE    IR 2.5 milliGRAM(s) Oral every 6 hours PRN Severe Pain  pancrelipase  (CREON  6,000 Lipase Units) 2 Capsule(s) Oral with dinner PRN Bedtime snack  saline laxative (FLEET) Rectal Enema 1 Enema Rectal once PRN Constipation  simethicone 80 milliGRAM(s) Chew every 8 hours PRN Gas  
MEDICATIONS  (PRN):  acetaminophen     Tablet .. 650 milliGRAM(s) Oral every 6 hours PRN Mild Pain (1 - 3)  aluminum hydroxide/magnesium hydroxide/simethicone Suspension 30 milliLiter(s) Oral every 4 hours PRN Dyspepsia  bisacodyl 5 milliGRAM(s) Oral every 12 hours PRN Constipation  bisacodyl Suppository 10 milliGRAM(s) Rectal daily PRN Constipation  diazepam    Tablet 5 milliGRAM(s) Oral every 12 hours PRN anxiety  oxyCODONE    IR 2.5 milliGRAM(s) Oral every 6 hours PRN Severe Pain  pancrelipase  (CREON  6,000 Lipase Units) 2 Capsule(s) Oral two times a day with meals PRN for snacks  saline laxative (FLEET) Rectal Enema 1 Enema Rectal once PRN Constipation  simethicone 80 milliGRAM(s) Chew every 8 hours PRN Gas  
MEDICATIONS  (PRN):  acetaminophen     Tablet .. 650 milliGRAM(s) Oral every 6 hours PRN Mild Pain (1 - 3)  aluminum hydroxide/magnesium hydroxide/simethicone Suspension 30 milliLiter(s) Oral every 4 hours PRN Dyspepsia  bisacodyl 5 milliGRAM(s) Oral every 12 hours PRN Constipation  bisacodyl Suppository 10 milliGRAM(s) Rectal daily PRN Constipation  diazepam    Tablet 5 milliGRAM(s) Oral two times a day PRN anxiety  melatonin. 3 milliGRAM(s) Oral at bedtime PRN Insomnia  oxyCODONE    IR 2.5 milliGRAM(s) Oral every 6 hours PRN Severe Pain  pancrelipase  (CREON  6,000 Lipase Units) 2 Capsule(s) Oral two times a day with meals PRN for snacks  saline laxative (FLEET) Rectal Enema 1 Enema Rectal once PRN Constipation  simethicone 80 milliGRAM(s) Chew every 8 hours PRN Gas  
MEDICATIONS  (PRN):  acetaminophen     Tablet .. 650 milliGRAM(s) Oral every 6 hours PRN Mild Pain (1 - 3)  aluminum hydroxide/magnesium hydroxide/simethicone Suspension 30 milliLiter(s) Oral every 4 hours PRN Dyspepsia  bisacodyl 5 milliGRAM(s) Oral every 12 hours PRN Constipation  bisacodyl Suppository 10 milliGRAM(s) Rectal daily PRN Constipation  diazepam    Tablet 5 milliGRAM(s) Oral two times a day PRN anxiety  oxyCODONE    IR 2.5 milliGRAM(s) Oral every 6 hours PRN Severe Pain  pancrelipase  (CREON  6,000 Lipase Units) 2 Capsule(s) Oral two times a day with meals PRN for snacks  saline laxative (FLEET) Rectal Enema 1 Enema Rectal once PRN Constipation  simethicone 80 milliGRAM(s) Chew every 8 hours PRN Gas

## 2024-10-24 NOTE — BH INPATIENT PSYCHIATRY PROGRESS NOTE - NSBHCONTPROVIDER_PSY_ALL_CORE
Not applicable

## 2024-10-24 NOTE — BH INPATIENT PSYCHIATRY PROGRESS NOTE - NSICDXBHPRIMARYDX_PSY_ALL_CORE
Major depression   F32.9  

## 2024-10-24 NOTE — BH INPATIENT PSYCHIATRY PROGRESS NOTE - NSBHFUPINTERVALHXFT_PSY_A_CORE
Patient seen and evaluated, staff consulted, chart, labs, meds reviewed. No acute events over this interval, pt states "they didn't give me my oxy last night", received PRN oxycodone 2.5 mg this AM at 9:36. When asked for mood pt states "my back is on fire." Pt states she is looking forward to going home tomorrow. Denies self harming urges, denies SI/I/P. Tolerating cymbalta well.     Spoke with , Roberto Diaz, who is agreeable to pt going home tomorrow. Informed  pt has been relatively stable on pain regimen here of tylenol PRN, standing dosage of lyrica 25 mg TID, and oxycodone 2.5 mg q6h PRN. Pt to be sent with 10 days of oxycodone, informed  pt will need close follow up with PCP or pain management doctor to maintain prescription.  expressed understanding.

## 2024-10-24 NOTE — BH INPATIENT PSYCHIATRY PROGRESS NOTE - NSBHATTESTCOMMENTATTENDFT_PSY_A_CORE
Pt is seen and evaluated and discussed with treatment team.  Agree with assessment and plan as stated.

## 2024-10-24 NOTE — BH INPATIENT PSYCHIATRY PROGRESS NOTE - MSE UNSTRUCTURED FT
Patient seen in hospital bed. Affect is irritable, constricted. Fair eye contact. Speech fluent. TP coherent, logical. No delusions. Focused on somatic symptoms incl constipation, agreed to take daily miralax. Denies SI. No perceptual disturbance elicited. AAOx3. Partial insight.
Patient is awake and alert. In gerichair. Affect is irritable, constricted. Poor eye contact. Speech fluent. TP coherent, logical. No delusions. Focused on pain. Good   episodic memory. Denies thoughts of hurting herself now, although expresses hopelessness. No suicidal ideations of any active kind, some expressed passive ideations "because of pain and you can't help with that." Limited insight. 
Patient seen in hospital bed. Affect is irritable, constricted. Fair eye contact. Speech fluent. TP coherent, logical. No delusions. Focused on somatic symptoms incl constipation, agreed to take daily miralax. Denies SI. No perceptual disturbance elicited. AAOx3. Partial insight.
Patient seen in hospital bed. Affect is irritable, constricted. Fair eye contact. Speech fluent. TP coherent, logical. No delusions. Focused on somatic symptoms incl constipation, agreed to take daily miralax. Denies SI. No perceptual disturbance elicited. AAOx3. Partial insight.
Patient is awake and alert. In gerichair. Affect is irritable, constricted. Poor eye contact. Speech fluent. TP coherent, logical. No delusions. Focused on pain. Good   episodic memory. Denies thoughts of hurting herself now, although expresses hopelessness. No suicidal ideations of any active kind, some expressed passive ideations "because of pain and you can't help with that." Limited insight. 
Patient is awake and alert. In gerichair. Affect is irritable, constricted. Poor eye contact. Speech fluent. TP coherent, logical. No delusions. Focused on pain. Good   episodic memory. Denies SI. No arnold. Oriented. Partial insight
Patient is awake and alert. In gerichair. Affect is irritable, constricted. Poor eye contact. Speech fluent. TP coherent, logical. No delusions. Focused on pain. Good   episodic memory. Denies thoughts of hurting herself now, although expresses hopelessness. No suicidal ideations of any active kind, some expressed passive ideations "because of pain and you can't help with that." Limited insight. 
Patient seen in hospital bed. Pt unable to state specific mood, states "my back is on fire" when asked. Affect is depressed, constricted. Fair eye contact. Speech fluent. TP coherent, logical. No delusions. Pt's thought content focused on physical pain. Denies SI/I/P. No perceptual disturbance elicited. AAOx3. Partial insight. Judgement is improved in that she is amenable to continuing medications. Impulse control is intact over this interval. 
no

## 2024-10-24 NOTE — BH INPATIENT PSYCHIATRY PROGRESS NOTE - NSTXDCOTHRGOAL_PSY_ALL_CORE
Pt will comply with recommended medication and increase activity level as tolerated, with an overall improvement in symptoms.
The patient will report improved mood and coping skills with no SIIP.
Pt will comply with recommended medication and increase activity level as tolerated, with an overall improvement in symptoms.
Pt will comply with recommended medication and increase activity level as tolerated, with an overall improvement in symptoms.
The patient will report improved mood and coping skills with no SIIP.
Pt will comply with recommended medication and increase activity level as tolerated, with an overall improvement in symptoms.
The patient will report improved mood and coping skills with no SIIP.
Pt will comply with recommended medication and increase activity level as tolerated, with an overall improvement in symptoms.

## 2024-10-25 VITALS — TEMPERATURE: 97 F

## 2024-10-25 PROCEDURE — 99238 HOSP IP/OBS DSCHRG MGMT 30/<: CPT

## 2024-10-25 RX ADMIN — PANCRELIPASE 1 CAPSULE(S): 16800; 98400; 56800 CAPSULE, DELAYED RELEASE ORAL at 08:49

## 2024-10-25 RX ADMIN — PREGABALIN 25 MILLIGRAM(S): 150 CAPSULE ORAL at 08:45

## 2024-10-25 RX ADMIN — OXYCODONE HYDROCHLORIDE 2.5 MILLIGRAM(S): 30 TABLET ORAL at 00:01

## 2024-10-25 RX ADMIN — Medication 1 TABLET(S): at 08:45

## 2024-10-25 RX ADMIN — Medication 1000 UNIT(S): at 08:45

## 2024-10-25 RX ADMIN — OXYCODONE HYDROCHLORIDE 2.5 MILLIGRAM(S): 30 TABLET ORAL at 01:28

## 2024-10-25 RX ADMIN — DULOXETINE HYDROCHLORIDE 30 MILLIGRAM(S): 30 CAPSULE, DELAYED RELEASE ORAL at 08:45

## 2024-10-25 NOTE — BH INPATIENT PSYCHIATRY DISCHARGE NOTE - NSBHDCRISKMITIGATE_PSY_ALL_CORE
Safety planning/Reduction in access to lethal methods (pills, firearms, etc)/Family/Other social support involvement/Other

## 2024-10-25 NOTE — BH INPATIENT PSYCHIATRY DISCHARGE NOTE - NSBHFUPINTERVALHXFT_PSY_A_CORE
Ms. Diaz was seen today for follow up of depression, in preparation for discharge. Chart reviewed and discussed with nursing staff. Remains on CO for hospital bed. Compliant with her medications. Requested PRN Oxycodone 2.5mg at 12:01am for pain.     During rounds this morning pt was seen at bedside. Affect is overall more reactive and brighter. Pt reports gains in terms of mood and denies suicidal ideation. Denies side effects from her medications. Pt presenting a more positive outlook on life and is more future oriented. Discussed discharge planning for this Friday. Homecare is set up to start Friday at noon. Pt verbalized understanding.

## 2024-10-25 NOTE — BH INPATIENT PSYCHIATRY DISCHARGE NOTE - HOSPITAL COURSE
Dates of hospitalization: 10/8/24-10/25/24    Ms. Diaz is a 68 with PPHx of depression and anxiety with no prior inpatient psychiatric hospitalizations, PMH significant for chronic back pain,  irritable bowel syndrome and osteoporosis with hx of compression fractures who presented s/p suicide attempt via overdose on her medication.    On arrival to  patient's presentation is consistent with MDD in the context of functional decline and worsening chronic pain over the past year. Pt reports low mood, hopelessness, anhedonia and passive SI. Denies active suicidal ideation, intent or plans now. On admission pt was very dysphoric and focused on being discharged. Pt was initially against starting any medications to treat depression since she was worried about side effects. Reported trying several antidepressants over the past year and stopping them after 2-3 days due to perceived side effects such as constipation or difficulty sleeping. Agreed to start trial of Cymbalta. Pt took it sporadically at the beginning but eventually gained more consistence and allowed for titration up to 30mg daily. Ativan PRN was d/c and swiotched to Valium 5mg PRN every 12 hours (which she was on in [facility]) since pt reported better results.     Medically, Ms. Diaz was continued on pain management regimen started by Pain specialists in [facility] consisting of Lyrica 25mg TID and Oxydocone 2.5mg PO PRN Q6H. Full spine MRI with anesthesia was offered in order to evaluate patient's current condition however pt declined.     Pt showed slow improvement. Her affect was greatly affected by severity of pain at any given day and her overall satisfaction with the care provided by staff. Basic CBT concepts such as thought labeling, distorted thinking and relationship between behavior and mood was discussed with patient. Ms. Diaz was able to develop coping skills to help herself cope with her chronic pain, obstacles and suicidal thoughts in a healthier way. She was able to employ some of these coping skills in the unit with good effect such as aromatherapy and soft music. Pt reported after a few days that her pain was better managed on her new regimen and was able to experience longer stretches of time without experiencing "burning pain". She started ambulating through the unit with assistance of rolling walker and worked with physical therapy.     On day of discharge, the patient has improved significantly and no longer requires inpatient treatment and care. Patient denies all suicidal and aggressive ideation, intent and plan. Patient displays no psychotic symptoms. Patient is not judged to be an acute danger to self or others at this time. Affect is overall more reactive and brighter. Pt reports gains in terms of mood and denies suicidal ideation. Denies side effects from her medications. Pt presenting a more positive outlook on life and is more future oriented.  denies having safety concerns bringing Ms. Diaz home.     Risk Assessment:     Chronic risk factors include age, chronic pain and 1 past suicide attempt via overdose. Acute risk factors included worsening back pain over the past year accompanied by acute depressive episode. Acute risk factors now treated with adjustment in patient's med management regimen with good results and treatment of depression with pharmacotherapy tailored to patient's specific needs, given Cymbalta may also aid with patient's neuropathic pain. Risk management and suicide prevention strategies were discussed at length with both patient and her .  was instructed to get rid of old prescriptions and supplements pt no longer takes and to keep all medications out of reach. He will take over medication administration for now on.   Protective factors include stable housing, supportive family, [employer] doug and sobriety.

## 2024-10-25 NOTE — BH INPATIENT PSYCHIATRY DISCHARGE NOTE - NSBHDCRISKMITIGATEFT_PSY_ALL_CORE
will keep patient's medications out of reach and administer medications to patient from now on. Homecare was reinstated.

## 2024-10-25 NOTE — BH INPATIENT PSYCHIATRY DISCHARGE NOTE - NSDCCPCAREPLAN_GEN_ALL_CORE_FT
PRINCIPAL DISCHARGE DIAGNOSIS  Diagnosis: Depression, major, severe recurrence  Assessment and Plan of Treatment:       SECONDARY DISCHARGE DIAGNOSES  Diagnosis: Chronic back pain  Assessment and Plan of Treatment:

## 2024-10-25 NOTE — BH INPATIENT PSYCHIATRY DISCHARGE NOTE - NSBHASSESSSUMMFT_PSY_ALL_CORE
Ms. Diaz is a 68 with PPHx of depression and anxiety with no prior inpatient psychiatric hospitalizations, PMH significant for chronic back pain,  irritable bowel syndrome and osteoporosis with hx of compression fractures who presented s/p suicide attempt via overdose on her medication.    On arrival to  patient's presentation is consistent with MDD in the context of functional decline and worsening chronic pain over the past year. Pt reports low mood, hopelessness, anhedonia and passive SI. Denies active suicidal ideation, intent or plans now. On admission pt was very dysphoric and focused on being discharged. Pt was initially against starting any medications to treat depression since she was worried about side effects. Reported trying several antidepressants over the past year and stopping them after 2-3 days due to perceived side effects such as constipation or difficulty sleeping. Agreed to start trial of Cymbalta. Pt took it sporadically at the beginning but eventually gained more consistence and allowed for titration up to 30mg daily. Ativan PRN was d/c and swiotched to Valium 5mg PRN every 12 hours (which she was on in [facility]) since pt reported better results.     Medically, Ms. Diaz was continued on pain management regimen started by Pain specialists in [facility] consisting of Lyrica 25mg TID and Oxydocone 2.5mg PO PRN Q6H. Full spine MRI with anesthesia was offered in order to evaluate patient's current condition however pt declined.     Pt showed slow improvement. Her affect was greatly affected by severity of pain at any given day and her overall satisfaction with the care provided by staff. Basic CBT concepts such as thought labeling, distorted thinking and relationship between behavior and mood was discussed with patient. Ms. Diaz was able to develop coping skills to help herself cope with her chronic pain, obstacles and suicidal thoughts in a healthier way. She was able to employ some of these coping skills in the unit with good effect such as aromatherapy and soft music. Pt reported after a few days that her pain was better managed on her new regimen and was able to experience longer stretches of time without experiencing "burning pain". She started ambulating through the unit with assistance of rolling walker and worked with physical therapy.     On day of discharge, the patient has improved significantly and no longer requires inpatient treatment and care. Patient denies all suicidal and aggressive ideation, intent and plan. Patient displays no psychotic symptoms. Patient is not judged to be an acute danger to self or others at this time. Affect is overall more reactive and brighter. Pt reports gains in terms of mood and denies suicidal ideation. Denies side effects from her medications. Pt presenting a more positive outlook on life and is more future oriented.  denies having safety concerns bringing Ms. Diaz home.

## 2024-10-25 NOTE — BH INPATIENT PSYCHIATRY DISCHARGE NOTE - HPI (INCLUDE ILLNESS QUALITY, SEVERITY, DURATION, TIMING, CONTEXT, MODIFYING FACTORS, ASSOCIATED SIGNS AND SYMPTOMS)
Patient is a 69 yo female with PPHx of depression and anxiety with no prior inpatient psychiatric hospitalizations, currently in outpatient treatment with psychiatrist Dr. Puente, and PMH significant for chronic back pain,  irritable bowel syndrome and osteoporosis with hx of compression fractures who presented s/p suicide attempt via overdose on her medication  night prior to ED presentation on 9/29- she reports she took duloxetine and "some pain meds." According to   collateral, in the past year, has become bedbound, unable to lay on her back or engage in meaningful physical activity, and no longer able to attend [employer] weekly. Also endorses poor sleep and overall low mood due to the pain. Patient states that her osteoporosis was so significant that earlier this year she had home hospice care to manage pain. She was discharged from home hospice as she was not taking the pain medication that was prescribed (patient reported experiencing abdominal discomfort due to the morphine at the time). States she is very sensitive to medications and also suffers from IBS with predominant diarrhea symptoms which greatly limits her appetite and choice of food. In June, also fractured her right hip which needed 3 pins. Patient states that she started feeling hopeless due to several failed medication trials that did not help with her pain. States that the only medication that has helped so far is dilaudid but that made her feel "spaced out and loopy." Pt reports prior trials of remeron (causes restlessness), lunesta/ambien (ineffective for insomnia), ativan, amitriptyline (made her feel like a zombie), effexor, cymbalta, percocet, doxepin, hydroxyzine, and klonopin. The last medication she tried was lyrica which did not help and this triggered her to attempt suicide. Reports she took several of her medications (effexor, cymbalta, doxepin, percocet, klonopin) with the intent of ending her life so that the pain will stop. Is tearful when explaining this, stating that she finds meaning in her life with her doug, loves her , and enjoys her TV shows and using motivational apps. She clarifies that her SA was solely so that she could stop the ongoing pain even though she dose not necessarily want to end her life. Admits to feeling surprised that she woke up after her SA, and decided to come to the hospital for help. Patient was admitted to hospital for pain. followed by [facility] psych  Due to significant safety concerns, suicidal ideation, recent suicide attempt, admitted on 2PC

## 2024-10-25 NOTE — BH CHART NOTE - NSEVENTNOTEFT_PSY_ALL_CORE
Verbal handoff provided to Dr. Kwame Martínez's assistant. Fax # to Dr. Martínez's clinic is [phone].

## 2024-10-25 NOTE — BH INPATIENT PSYCHIATRY DISCHARGE NOTE - NSBHMETABOLIC_PSY_ALL_CORE_FT
BMI: BMI (kg/m2): 16.1 (09-29-24 @ 08:14)  HbA1c:   Glucose:   BP: --Vital Signs Last 24 Hrs  T(C): 36.3 (10-25-24 @ 07:49), Max: 36.3 (10-25-24 @ 07:49)  T(F): 97.3 (10-25-24 @ 07:49), Max: 97.3 (10-25-24 @ 07:49)  HR: --  BP: --  BP(mean): --  RR: --  SpO2: --    Orthostatic VS  10-25-24 @ 07:49  Lying BP: 117/76 HR: 67  Sitting BP: --/-- HR: --  Standing BP: --/-- HR: --  Site: --  Mode: --  Orthostatic VS  10-24-24 @ 07:45  Lying BP: 100/62 HR: 71  Sitting BP: 97/60 HR: 70  Standing BP: --/-- HR: --  Site: upper left arm  Mode: electronic    Lipid Panel:

## 2024-10-25 NOTE — BH INPATIENT PSYCHIATRY DISCHARGE NOTE - NSBHDCHANDOFFFT_PSY_ALL_CORE
Handoff and copies of discharge summary given to Dr. Kwame Martínez ([phone]) along with contact information for this writer in the event future questions arise.

## 2024-10-25 NOTE — BH INPATIENT PSYCHIATRY DISCHARGE NOTE - NSBHDCMEDICALFT_PSY_A_CORE
Ms. Diaz was continued on pain management regimen started by Pain specialists in [facility] consisting of Lyrica 25mg TID and Oxydocone 2.5mg PO PRN Q6H. Full spine MRI with anesthesia was offered in order to evaluate patient's current condition however pt declined.

## 2024-10-25 NOTE — BH INPATIENT PSYCHIATRY DISCHARGE NOTE - NSDCMRMEDTOKEN_GEN_ALL_CORE_FT
aluminum hydroxide-magnesium hydroxide 200 mg-200 mg/5 mL oral suspension: 30 milliliter(s) orally every 4 hours As needed Dyspepsia  calcium carbonate 1250 mg (500 mg elemental calcium) oral tablet: 1 tab(s) orally 2 times a day  cholecalciferol oral tablet: 1000 unit(s) orally once a day  Creon 6000 units oral delayed release capsule: 1 cap(s) orally 5 times a day with meals three times a day and with snacks as needed  DULoxetine 30 mg oral delayed release capsule: 1 cap(s) orally once a day  FeroSul 325 mg (65 mg elemental iron) oral tablet: 1 tab(s) orally once a day  Innerclean oral tablet: 2 tab(s) orally once a day (at bedtime)  Lyrica 25 mg oral capsule: 1 cap(s) orally 3 times a day MDD: 75mg  melatonin 3 mg oral tablet: 1 tab(s) orally once a day (at bedtime) As needed Insomnia  Multiple Vitamins with Minerals oral tablet: 1 tab(s) orally once a day  oxyCODONE 5 mg oral tablet: 0.5 tab(s) orally 4 times a day as needed for  moderate pain MDD: 10mg  polyethylene glycol 3350 oral powder for reconstitution: 17 gram(s) orally once a day  simethicone 80 mg oral tablet, chewable: 1 tab(s) orally every 8 hours As needed Gas  Tylenol 325 mg oral tablet: 2 tab(s) orally every 6 hours as needed for  mild pain  Valium 5 mg oral tablet: 1 tab(s) orally every 12 hours as needed for anxiety MDD: 10mg

## 2024-10-25 NOTE — BH INPATIENT PSYCHIATRY DISCHARGE NOTE - OTHER PAST PSYCHIATRIC HISTORY (INCLUDE DETAILS REGARDING ONSET, COURSE OF ILLNESS, INPATIENT/OUTPATIENT TREATMENT)
Patient is a 69 yo   female, domiciled with spouse in [place], [place],  with PPHx of depression and anxiety with no prior inpatient psychiatric hospitalizations, currently in outpatient treatment with psychiatrist Dr. Puente, and PMH significant for chronic back pain,  irritable bowel syndrome, osteoporosis, and with hx of compression fractures, who presented s/p suicide attempt via overdose on her medication night prior to ED presentation on 9/29.  According to   collateral, in the past year, the patient has become bedbound, unable to lay on her back or engage in meaningful physical activity, and no longer able to attend [employer] weekly.  Also endorsed poor sleep and overall low mood due to the pain. Patient stated that her osteoporosis was so significant that earlier this year she had home hospice care to manage pain. She was discharged from home hospice as she was not taking the pain medication that was prescribed (patient reported experiencing abdominal discomfort due to the morphine at the time).  The patient indicated that she is very sensitive to medications and also suffers from IBS with predominant diarrhea symptoms which greatly limits her appetite and choice of food. In June, also fractured her right hip which needed 3 pins. Patient stated that she started feeling hopeless due to several failed medication trials that did not help with her pain.

## 2024-12-09 ENCOUNTER — TRANSCRIPTION ENCOUNTER (OUTPATIENT)
Age: 69
End: 2024-12-09

## 2024-12-09 ENCOUNTER — APPOINTMENT (OUTPATIENT)
Dept: ORTHOPEDIC SURGERY | Facility: CLINIC | Age: 69
End: 2024-12-09
Payer: COMMERCIAL

## 2024-12-09 VITALS — WEIGHT: 71 LBS | HEIGHT: 58 IN | BODY MASS INDEX: 14.91 KG/M2

## 2024-12-09 DIAGNOSIS — Z87.19 PERSONAL HISTORY OF OTHER DISEASES OF THE DIGESTIVE SYSTEM: ICD-10-CM

## 2024-12-09 DIAGNOSIS — Z87.39 PERSONAL HISTORY OF OTHER DISEASES OF THE MUSCULOSKELETAL SYSTEM AND CONNECTIVE TISSUE: ICD-10-CM

## 2024-12-09 DIAGNOSIS — S32.010A WEDGE COMPRESSION FRACTURE OF FIRST LUMBAR VERTEBRA, INITIAL ENCOUNTER FOR CLOSED FRACTURE: ICD-10-CM

## 2024-12-09 DIAGNOSIS — M40.04 POSTURAL KYPHOSIS, THORACIC REGION: ICD-10-CM

## 2024-12-09 PROCEDURE — 72170 X-RAY EXAM OF PELVIS: CPT

## 2024-12-09 PROCEDURE — 99214 OFFICE O/P EST MOD 30 MIN: CPT

## 2024-12-09 PROCEDURE — 72070 X-RAY EXAM THORAC SPINE 2VWS: CPT

## 2024-12-09 PROCEDURE — 72100 X-RAY EXAM L-S SPINE 2/3 VWS: CPT

## 2024-12-09 RX ORDER — PREGABALIN 25 MG/1
25 CAPSULE ORAL
Refills: 0 | Status: ACTIVE | COMMUNITY

## 2024-12-09 RX ORDER — DULOXETINE HYDROCHLORIDE 20 MG/1
20 CAPSULE, DELAYED RELEASE ORAL
Refills: 0 | Status: ACTIVE | COMMUNITY

## 2024-12-09 RX ORDER — OXYCODONE HYDROCHLORIDE 5 MG/5ML
SOLUTION ORAL
Refills: 0 | Status: ACTIVE | COMMUNITY

## 2024-12-09 RX ORDER — DIAZEPAM 5 MG/1
5 TABLET ORAL
Refills: 0 | Status: ACTIVE | COMMUNITY

## 2024-12-16 ENCOUNTER — APPOINTMENT (OUTPATIENT)
Dept: MRI IMAGING | Facility: CLINIC | Age: 69
End: 2024-12-16

## 2024-12-30 ENCOUNTER — APPOINTMENT (OUTPATIENT)
Dept: ORTHOPEDIC SURGERY | Facility: CLINIC | Age: 69
End: 2024-12-30

## 2025-01-09 DIAGNOSIS — M79.18 MYALGIA, OTHER SITE: ICD-10-CM

## 2025-01-09 DIAGNOSIS — M54.6 PAIN IN THORACIC SPINE: ICD-10-CM

## 2025-02-25 ENCOUNTER — NON-APPOINTMENT (OUTPATIENT)
Age: 70
End: 2025-02-25

## 2025-02-25 ENCOUNTER — APPOINTMENT (OUTPATIENT)
Dept: ORTHOPEDIC SURGERY | Facility: CLINIC | Age: 70
End: 2025-02-25

## 2025-02-28 ENCOUNTER — APPOINTMENT (OUTPATIENT)
Dept: ORTHOPEDIC SURGERY | Facility: CLINIC | Age: 70
End: 2025-02-28
Payer: COMMERCIAL

## 2025-02-28 DIAGNOSIS — M40.04 POSTURAL KYPHOSIS, THORACIC REGION: ICD-10-CM

## 2025-02-28 DIAGNOSIS — Z87.39 PERSONAL HISTORY OF OTHER DISEASES OF THE MUSCULOSKELETAL SYSTEM AND CONNECTIVE TISSUE: ICD-10-CM

## 2025-02-28 DIAGNOSIS — S32.040A WEDGE COMPRESSION FRACTURE OF FOURTH LUMBAR VERTEBRA, INITIAL ENCOUNTER FOR CLOSED FRACTURE: ICD-10-CM

## 2025-02-28 DIAGNOSIS — S32.050A WEDGE COMPRESSION FRACTURE OF FIFTH LUMBAR VERTEBRA, INITIAL ENCOUNTER FOR CLOSED FRACTURE: ICD-10-CM

## 2025-02-28 DIAGNOSIS — S32.010A WEDGE COMPRESSION FRACTURE OF FIRST LUMBAR VERTEBRA, INITIAL ENCOUNTER FOR CLOSED FRACTURE: ICD-10-CM

## 2025-02-28 PROCEDURE — 99204 OFFICE O/P NEW MOD 45 MIN: CPT

## 2025-03-20 ENCOUNTER — APPOINTMENT (OUTPATIENT)
Dept: ORTHOPEDIC SURGERY | Facility: CLINIC | Age: 70
End: 2025-03-20

## 2025-03-31 ENCOUNTER — APPOINTMENT (OUTPATIENT)
Dept: ORTHOPEDIC SURGERY | Facility: CLINIC | Age: 70
End: 2025-03-31
Payer: COMMERCIAL

## 2025-03-31 DIAGNOSIS — M40.04 POSTURAL KYPHOSIS, THORACIC REGION: ICD-10-CM

## 2025-03-31 DIAGNOSIS — Z87.39 PERSONAL HISTORY OF OTHER DISEASES OF THE MUSCULOSKELETAL SYSTEM AND CONNECTIVE TISSUE: ICD-10-CM

## 2025-03-31 PROCEDURE — 99213 OFFICE O/P EST LOW 20 MIN: CPT

## 2025-03-31 RX ORDER — DICLOFENAC SODIUM 3 G/100G
3 GEL TOPICAL TWICE DAILY
Qty: 1 | Refills: 2 | Status: ACTIVE | COMMUNITY
Start: 2025-03-31 | End: 1900-01-01

## 2025-04-08 ENCOUNTER — APPOINTMENT (OUTPATIENT)
Dept: PAIN MANAGEMENT | Facility: CLINIC | Age: 70
End: 2025-04-08
Payer: COMMERCIAL

## 2025-04-08 VITALS — BODY MASS INDEX: 15.11 KG/M2 | WEIGHT: 72 LBS | HEIGHT: 58 IN

## 2025-04-08 DIAGNOSIS — S32.010A WEDGE COMPRESSION FRACTURE OF FIRST LUMBAR VERTEBRA, INITIAL ENCOUNTER FOR CLOSED FRACTURE: ICD-10-CM

## 2025-04-08 DIAGNOSIS — S32.050A WEDGE COMPRESSION FRACTURE OF FIFTH LUMBAR VERTEBRA, INITIAL ENCOUNTER FOR CLOSED FRACTURE: ICD-10-CM

## 2025-04-08 DIAGNOSIS — S32.040A WEDGE COMPRESSION FRACTURE OF FOURTH LUMBAR VERTEBRA, INITIAL ENCOUNTER FOR CLOSED FRACTURE: ICD-10-CM

## 2025-04-08 PROCEDURE — 99203 OFFICE O/P NEW LOW 30 MIN: CPT

## 2025-05-12 ENCOUNTER — APPOINTMENT (OUTPATIENT)
Dept: PAIN MANAGEMENT | Facility: CLINIC | Age: 70
End: 2025-05-12
Payer: COMMERCIAL

## 2025-05-12 VITALS — WEIGHT: 78 LBS | BODY MASS INDEX: 16.37 KG/M2 | HEIGHT: 58 IN

## 2025-05-12 DIAGNOSIS — S32.050A WEDGE COMPRESSION FRACTURE OF FIFTH LUMBAR VERTEBRA, INITIAL ENCOUNTER FOR CLOSED FRACTURE: ICD-10-CM

## 2025-05-12 DIAGNOSIS — M79.18 MYALGIA, OTHER SITE: ICD-10-CM

## 2025-05-12 PROCEDURE — 99204 OFFICE O/P NEW MOD 45 MIN: CPT

## 2025-05-23 ENCOUNTER — APPOINTMENT (OUTPATIENT)
Dept: ORTHOPEDIC SURGERY | Facility: CLINIC | Age: 70
End: 2025-05-23
Payer: COMMERCIAL

## 2025-05-23 DIAGNOSIS — Z87.39 PERSONAL HISTORY OF OTHER DISEASES OF THE MUSCULOSKELETAL SYSTEM AND CONNECTIVE TISSUE: ICD-10-CM

## 2025-05-23 DIAGNOSIS — M48.54XS COLLAPSED VERTEBRA, NOT ELSEWHERE CLASSIFIED, THORACIC REGION, SEQUELA OF FRACTURE: ICD-10-CM

## 2025-05-23 DIAGNOSIS — M40.04 POSTURAL KYPHOSIS, THORACIC REGION: ICD-10-CM

## 2025-05-23 DIAGNOSIS — M41.50 OTHER SECONDARY SCOLIOSIS, SITE UNSPECIFIED: ICD-10-CM

## 2025-05-23 DIAGNOSIS — M84.48XS PATHOLOGICAL FRACTURE, OTHER SITE, SEQUELA: ICD-10-CM

## 2025-05-23 PROCEDURE — 99214 OFFICE O/P EST MOD 30 MIN: CPT

## 2025-06-16 ENCOUNTER — APPOINTMENT (OUTPATIENT)
Dept: PAIN MANAGEMENT | Facility: CLINIC | Age: 70
End: 2025-06-16